# Patient Record
Sex: FEMALE | Race: WHITE | HISPANIC OR LATINO | Employment: FULL TIME | URBAN - METROPOLITAN AREA
[De-identification: names, ages, dates, MRNs, and addresses within clinical notes are randomized per-mention and may not be internally consistent; named-entity substitution may affect disease eponyms.]

---

## 2019-03-09 ENCOUNTER — OFFICE VISIT (OUTPATIENT)
Dept: URGENT CARE | Facility: CLINIC | Age: 24
End: 2019-03-09
Payer: MEDICAID

## 2019-03-09 VITALS
OXYGEN SATURATION: 99 % | HEIGHT: 63 IN | TEMPERATURE: 98 F | WEIGHT: 267 LBS | RESPIRATION RATE: 18 BRPM | SYSTOLIC BLOOD PRESSURE: 136 MMHG | HEART RATE: 80 BPM | DIASTOLIC BLOOD PRESSURE: 80 MMHG | BODY MASS INDEX: 47.31 KG/M2

## 2019-03-09 DIAGNOSIS — J06.9 VIRAL UPPER RESPIRATORY TRACT INFECTION WITH COUGH: Primary | ICD-10-CM

## 2019-03-09 DIAGNOSIS — R09.81 NASAL SINUS CONGESTION: ICD-10-CM

## 2019-03-09 PROCEDURE — 99213 OFFICE O/P EST LOW 20 MIN: CPT | Mod: S$GLB,,, | Performed by: PHYSICIAN ASSISTANT

## 2019-03-09 PROCEDURE — 99213 PR OFFICE/OUTPT VISIT, EST, LEVL III, 20-29 MIN: ICD-10-PCS | Mod: S$GLB,,, | Performed by: PHYSICIAN ASSISTANT

## 2019-03-09 RX ORDER — PREDNISONE 5 MG/1
5 TABLET ORAL DAILY
Qty: 5 TABLET | Refills: 0 | Status: SHIPPED | OUTPATIENT
Start: 2019-03-09 | End: 2019-03-14

## 2019-03-09 RX ORDER — GUAIFENESIN 600 MG/1
600 TABLET, EXTENDED RELEASE ORAL 2 TIMES DAILY
Qty: 40 TABLET | Refills: 0 | Status: SHIPPED | OUTPATIENT
Start: 2019-03-09 | End: 2019-03-29

## 2019-03-09 RX ORDER — IBUPROFEN 400 MG/1
400 TABLET ORAL EVERY 6 HOURS PRN
Qty: 60 TABLET | Refills: 2 | Status: SHIPPED | OUTPATIENT
Start: 2019-03-09 | End: 2020-03-08

## 2019-03-09 RX ORDER — PROMETHAZINE HYDROCHLORIDE 6.25 MG/5ML
6.25 SYRUP ORAL 4 TIMES DAILY PRN
Qty: 120 ML | Refills: 0 | Status: SHIPPED | OUTPATIENT
Start: 2019-03-09 | End: 2020-03-08

## 2019-03-09 NOTE — PATIENT INSTRUCTIONS
Viral Upper Respiratory Illness with Wheezing (Adult)  You have a viral upper respiratory illness (URI), which is another term for the common cold. When the infection causes a lot of irritation, the air passages can go into spasm. This causes wheezing and shortness of breath.    This illness is contagious during the first few days. It is spread through the air by coughing and sneezing. It may also be spread by direct contact (touching the sick person and then touching your own eyes, nose, or mouth). Frequent handwashing will decrease the risk.  Most viral illnesses go away within 7 to 10 days with rest and simple home remedies. Sometimes the illness may last for several weeks. Antibiotics will not kill a virus, and they are generally not prescribed for this condition.  Home care  · If symptoms are severe, rest at home for the first 2 to 3 days. When you resume activity, don't let yourself get too tired.  · Avoid being exposed to cigarette smoke (yours or others).  · You may use acetaminophen or ibuprofen to control pain and fever, unless another medicine was prescribed. (Note: If you have chronic liver or kidney disease, have ever had a stomach ulcer or gastrointestinal bleeding, or are taking blood-thinning medicines, talk with your healthcare provider before using these medicines.) Aspirin should never be given to anyone under 18 years of age who is ill with a viral infection or fever. It may cause severe liver or brain damage.  · Your appetite may be poor, so a light diet is fine. Avoid dehydration by drinking 6 to 8 glasses of fluids per day (water, soft drinks, juices, tea, or soup). Extra fluids will help loosen secretions in the nose and lungs.  · Over-the-counter cold medicines will not shorten the length of time youre sick, but they may be helpful for the following symptoms: cough, sore throat, and nasal and sinus congestion. (Note: Do not use decongestants if you have high blood pressure.)  Follow-up  care  Follow up with your healthcare provider, or as advised.  When to seek medical advice  Call your healthcare provider right away if any of these occur:  · Cough with lots of colored sputum (mucus)  · Severe headache; face, neck, or ear pain  · Difficulty swallowing due to throat pain  · Fever of 100.4ºF (38ºC) or higher, or as directed by your healthcare provider  Call 911, or get immediate medical care  Call emergency services right away if any of these occur:  · Chest pain, shortness of breath, worsening wheezing, or difficulty breathing  · Coughing up blood  · Inability to swallow due to throat pain  Date Last Reviewed: 9/13/2015  © 6196-1342 [employer] [address] [place] [address], [place] [address]. All rights reserved. This information is not intended as a substitute for professional medical care. Always follow your healthcare professional's instructions.

## 2019-03-09 NOTE — PROGRESS NOTES
"Subjective:       Patient ID: Peyton Qiu is a 23 y.o. female.    Vitals:  height is 5' 3" (1.6 m) and weight is 121.1 kg (267 lb). Her tympanic temperature is 97.8 °F (36.6 °C). Her blood pressure is 136/80 and her pulse is 80. Her respiration is 18 and oxygen saturation is 99%.     Chief Complaint: Cough    Cough for 3 days.      Cough   This is a new problem. The current episode started in the past 7 days. The problem has been unchanged. The cough is productive of sputum. Associated symptoms include chills, postnasal drip and wheezing. Pertinent negatives include no ear pain, eye redness, fever, hemoptysis, myalgias, rash, sore throat or shortness of breath. Nothing aggravates the symptoms. She has tried OTC cough suppressant for the symptoms. The treatment provided no relief. There is no history of asthma, bronchitis, COPD or pneumonia.       Constitution: Positive for chills. Negative for sweating, fatigue and fever.   HENT: Positive for postnasal drip. Negative for ear pain, congestion, sinus pain, sinus pressure, sore throat and voice change.    Neck: Negative for painful lymph nodes.   Eyes: Positive for eye discharge. Negative for eye redness.   Respiratory: Positive for chest tightness, cough, sputum production and wheezing. Negative for bloody sputum, COPD, shortness of breath, stridor and asthma.    Gastrointestinal: Negative for nausea and vomiting.   Musculoskeletal: Negative for muscle ache.   Skin: Negative for rash.   Allergic/Immunologic: Negative for seasonal allergies and asthma.   Hematologic/Lymphatic: Negative for swollen lymph nodes.       Patient states that she is feeling cold sx: productive cough, sinus congestion, and body aches when coughing, Denies fevers, N/V, chills.  Objective:      Physical Exam   Constitutional: She is oriented to person, place, and time. She appears well-developed and well-nourished. She is cooperative.  Non-toxic appearance. She does not appear " ill. No distress.   HENT:   Head: Normocephalic and atraumatic.   Right Ear: Hearing, external ear and ear canal normal. Tympanic membrane is injected.   Left Ear: Hearing, tympanic membrane, external ear and ear canal normal.   Nose: Rhinorrhea present. No mucosal edema or nasal deformity. No epistaxis. Right sinus exhibits no maxillary sinus tenderness and no frontal sinus tenderness. Left sinus exhibits no maxillary sinus tenderness and no frontal sinus tenderness.   Mouth/Throat: Uvula is midline and mucous membranes are normal. No trismus in the jaw. Normal dentition. No uvula swelling. Posterior oropharyngeal erythema present.   Eyes: Conjunctivae and lids are normal. No scleral icterus.   Sclera clear bilat   Neck: Trachea normal, full passive range of motion without pain and phonation normal. Neck supple.   Cardiovascular: Normal rate, regular rhythm, normal heart sounds, intact distal pulses and normal pulses.   Pulmonary/Chest: Effort normal. No respiratory distress. She has no decreased breath sounds. She has wheezes in the left upper field. She has no rhonchi. She has no rales.   Abdominal: Soft. Normal appearance and bowel sounds are normal. She exhibits no distension. There is no tenderness.   Musculoskeletal: Normal range of motion. She exhibits no edema or deformity.   Neurological: She is alert and oriented to person, place, and time. She exhibits normal muscle tone. Coordination normal.   Skin: Skin is warm, dry and intact. She is not diaphoretic. No pallor.   Psychiatric: She has a normal mood and affect. Her speech is normal and behavior is normal. Judgment and thought content normal. Cognition and memory are normal.   Nursing note and vitals reviewed.      Assessment:       1. Viral upper respiratory tract infection with cough    2. Nasal sinus congestion        Plan:         Viral upper respiratory tract infection with cough  -     promethazine (PHENERGAN) 6.25 mg/5 mL syrup; Take 5 mLs (6.25  mg total) by mouth 4 (four) times daily as needed (for cough at night time).  Dispense: 120 mL; Refill: 0  -     predniSONE (DELTASONE) 5 MG tablet; Take 1 tablet (5 mg total) by mouth once daily. for 5 days  Dispense: 5 tablet; Refill: 0  -     guaiFENesin (MUCINEX) 600 mg 12 hr tablet; Take 1 tablet (600 mg total) by mouth 2 (two) times daily. for 20 days  Dispense: 40 tablet; Refill: 0  -     ibuprofen (ADVIL,MOTRIN) 400 MG tablet; Take 1 tablet (400 mg total) by mouth every 6 (six) hours as needed.  Dispense: 60 tablet; Refill: 2    Nasal sinus congestion  -     loratadine-pseudoephedrine  mg (CLARITIN-D 24 HOUR)  mg per 24 hr tablet; Take 1 tablet by mouth once daily.  Dispense: 30 tablet; Refill: 0      Patient Instructions     Viral Upper Respiratory Illness with Wheezing (Adult)  You have a viral upper respiratory illness (URI), which is another term for the common cold. When the infection causes a lot of irritation, the air passages can go into spasm. This causes wheezing and shortness of breath.    This illness is contagious during the first few days. It is spread through the air by coughing and sneezing. It may also be spread by direct contact (touching the sick person and then touching your own eyes, nose, or mouth). Frequent handwashing will decrease the risk.  Most viral illnesses go away within 7 to 10 days with rest and simple home remedies. Sometimes the illness may last for several weeks. Antibiotics will not kill a virus, and they are generally not prescribed for this condition.  Home care  · If symptoms are severe, rest at home for the first 2 to 3 days. When you resume activity, don't let yourself get too tired.  · Avoid being exposed to cigarette smoke (yours or others).  · You may use acetaminophen or ibuprofen to control pain and fever, unless another medicine was prescribed. (Note: If you have chronic liver or kidney disease, have ever had a stomach ulcer or gastrointestinal  bleeding, or are taking blood-thinning medicines, talk with your healthcare provider before using these medicines.) Aspirin should never be given to anyone under 18 years of age who is ill with a viral infection or fever. It may cause severe liver or brain damage.  · Your appetite may be poor, so a light diet is fine. Avoid dehydration by drinking 6 to 8 glasses of fluids per day (water, soft drinks, juices, tea, or soup). Extra fluids will help loosen secretions in the nose and lungs.  · Over-the-counter cold medicines will not shorten the length of time youre sick, but they may be helpful for the following symptoms: cough, sore throat, and nasal and sinus congestion. (Note: Do not use decongestants if you have high blood pressure.)  Follow-up care  Follow up with your healthcare provider, or as advised.  When to seek medical advice  Call your healthcare provider right away if any of these occur:  · Cough with lots of colored sputum (mucus)  · Severe headache; face, neck, or ear pain  · Difficulty swallowing due to throat pain  · Fever of 100.4ºF (38ºC) or higher, or as directed by your healthcare provider  Call 911, or get immediate medical care  Call emergency services right away if any of these occur:  · Chest pain, shortness of breath, worsening wheezing, or difficulty breathing  · Coughing up blood  · Inability to swallow due to throat pain  Date Last Reviewed: 9/13/2015  © 8530-8734 [employer]. [address] [place] [address]. All rights reserved. This information is not intended as a substitute for professional medical care. Always follow your healthcare professional's instructions.          COLD HOME CARES:  Discussed with patient need for blowing the nose to expel mucus outwards. Also advised sleeping propped up in bed to around 30 degrees to not allow mucus to pool in sinuses or throat. Patient was advised to do salt water gargles upon awakening and at bedtime, with a few in  "between during the day if wished. Patient was given "Daytime vs. Nighttime" cough suppressive medications.     SORE THROAT HOME CARES:  Patient was recommended to continue with supportive cares such as lozenges, throat sprays, warm salt water gargles, OTC NSAIDs such as Ibuprofen or acetaminphen for discomfort of fevers above 101.0 degrees. Patient may benefit from an OTC antihistamine such as Zyrtec or Claritin for post nasal drip that may cause a sore throat.  The goal is to dry the sinuses and disrupt the over production of mucus that is filling the sinus cavities and secondarily causing a sore throat.    Consistency with treatment is key and patient was informed that these medications may be needed for the next few weeks.        "

## 2020-05-30 ENCOUNTER — HOSPITAL ENCOUNTER (EMERGENCY)
Facility: HOSPITAL | Age: 25
Discharge: HOME | End: 2020-05-30
Attending: EMERGENCY MEDICINE
Payer: COMMERCIAL

## 2020-05-30 VITALS
BODY MASS INDEX: 47.31 KG/M2 | DIASTOLIC BLOOD PRESSURE: 75 MMHG | WEIGHT: 267 LBS | HEIGHT: 63 IN | TEMPERATURE: 98 F | OXYGEN SATURATION: 98 % | HEART RATE: 88 BPM | RESPIRATION RATE: 18 BRPM | SYSTOLIC BLOOD PRESSURE: 159 MMHG

## 2020-05-30 DIAGNOSIS — Z20.822 EXPOSURE TO COVID-19 VIRUS: ICD-10-CM

## 2020-05-30 DIAGNOSIS — R51.9 ACUTE NONINTRACTABLE HEADACHE, UNSPECIFIED HEADACHE TYPE: Primary | ICD-10-CM

## 2020-05-30 LAB — SARS-COV-2 RDRP RESP QL NAA+PROBE: NEGATIVE

## 2020-05-30 PROCEDURE — U0002 COVID-19 LAB TEST NON-CDC: HCPCS

## 2020-05-30 PROCEDURE — 99282 EMERGENCY DEPT VISIT SF MDM: CPT | Mod: 25

## 2020-05-30 NOTE — ED PROVIDER NOTES
Encounter Date: 5/30/2020       History     Chief Complaint   Patient presents with    Headache     pt presents to ED today c/o ha's and nausea onset x 2 days after after starting menstrual cycle . pt reports her live in boyfriend tested positive for COVID-19 yesterday and is concerned she may be a carrier since her bodyfriend has not been out in public.     Nausea       Time seen by provider: 12:36 PM on 05/30/2020    The patient is a 24 y.o. female who presents to the ED with complaint of HA and nausea x2 days. Her boyfriend with whom she lives recently tested positive for Covid-19, and she is concerned that she is an asymptomatic carrier. The patient denies fever or any other concerning symptoms.    PMHx includes: Eczema, Seasonal allergic rhinitis, and Vertigo.    PSHx includes: [place] tooth extraction.      The history is provided by the patient.     Review of patient's allergies indicates:   Allergen Reactions    Augmentin [amoxicillin-pot clavulanate] Rash     Past Medical History:   Diagnosis Date    Eczema     Seasonal allergic rhinitis     Vertigo      Past Surgical History:   Procedure Laterality Date    WISDOM TOOTH EXTRACTION       Family History   Problem Relation Age of Onset    Coronary artery disease Father     Diabetes Maternal Grandmother     Heart disease Maternal Grandmother     No Known Problems Mother      Social History     Tobacco Use    Smoking status: Never Smoker    Smokeless tobacco: Never Used   Substance Use Topics    Alcohol use: No    Drug use: Not on file     Review of Systems   Constitutional: Negative for fever.   HENT: Negative for sore throat.    Respiratory: Negative for shortness of breath.    Cardiovascular: Negative for chest pain.   Gastrointestinal: Positive for nausea.   Genitourinary: Negative for dysuria.   Musculoskeletal: Negative for back pain.   Skin: Negative for rash.   Neurological: Positive for headaches. Negative for weakness.   Hematological:  Does not bruise/bleed easily.       Physical Exam     Initial Vitals   BP Pulse Resp Temp SpO2   05/30/20 1129 05/30/20 1128 05/30/20 1128 05/30/20 1128 05/30/20 1128   (!) 159/75 88 18 98.3 °F (36.8 °C) 98 %      MAP       --                Physical Exam    Nursing note and vitals reviewed.  Constitutional: She appears well-developed and well-nourished. She is not diaphoretic. No distress.   HENT:   Head: Normocephalic and atraumatic.   Right Ear: Tympanic membrane normal.   Left Ear: Tympanic membrane normal.   Mouth/Throat: Oropharynx is clear and moist.   Eyes: Conjunctivae and EOM are normal. Pupils are equal, round, and reactive to light.   Neck: Normal range of motion. Neck supple.   Cardiovascular: Normal rate, regular rhythm and normal heart sounds. Exam reveals no gallop and no friction rub.    No murmur heard.  Pulmonary/Chest: Breath sounds normal. She has no wheezes. She has no rhonchi. She has no rales.   Abdominal: Soft. Bowel sounds are normal. There is no tenderness. There is no rebound and no guarding.   Musculoskeletal: Normal range of motion. She exhibits no edema or tenderness.   Lymphadenopathy:     She has no cervical adenopathy.   Neurological: She is alert and oriented to person, place, and time. She has normal strength.   Skin: Skin is warm and dry. Capillary refill takes less than 2 seconds. No rash noted.         ED Course   Procedures  Labs Reviewed   SARS-COV-2 RNA AMPLIFICATION, QUAL          Imaging Results    None          Medical Decision Making:   History:   Old Medical Records: I decided to obtain old medical records.  Old Records Summarized: records from previous admission(s) and records from clinic visits.       <> Summary of Records: 10/24/16 - PT presented to ED for irregular menstrual cycles.  03/09/19 - PT presented to [facility] for flu-like symptoms   Clinical Tests:   Lab Tests: Reviewed and Ordered                                 Clinical Impression:       ICD-10-CM  ICD-9-CM   1. Acute nonintractable headache, unspecified headache type R51 784.0   2. Exposure to Covid-19 Virus Z20.828              ED Disposition Condition    Discharge Stable        ED Prescriptions     None        Follow-up Information     Follow up With Specialties Details Why Contact Info    Darvin Cage Jr., MD Pediatrics  As needed [address] [facility]  [place] [id number]  [phone]                          I, Melanie Bravo, personally performed the services described in this documentation. All medical record entries made by the scribe were at my direction and in my presence.  I have reviewed the chart and agree that the record reflects my personal performance and is accurate and complete. Melanie Bravo M.D. 1:49 PM05/30/2020             Melanie Bravo MD  05/30/20 1342

## 2020-05-30 NOTE — ED NOTES
Pt presents to the ED w/ c/ of headache and nausea for 2 days. Pt reports symptoms started after starting menstrual cycle this week. Pt reports that her live in boyfriend tested +for covid-19. Pt denies cough or SOB or fevers. Pt is awake alert orientedx4. Pt is NAD.

## 2020-07-30 ENCOUNTER — LAB VISIT (OUTPATIENT)
Dept: LAB | Facility: HOSPITAL | Age: 25
End: 2020-07-30
Attending: OBSTETRICS & GYNECOLOGY
Payer: COMMERCIAL

## 2020-07-30 ENCOUNTER — OFFICE VISIT (OUTPATIENT)
Dept: OBSTETRICS AND GYNECOLOGY | Facility: CLINIC | Age: 25
End: 2020-07-30
Payer: COMMERCIAL

## 2020-07-30 VITALS
HEIGHT: 63 IN | SYSTOLIC BLOOD PRESSURE: 124 MMHG | WEIGHT: 293 LBS | BODY MASS INDEX: 51.91 KG/M2 | DIASTOLIC BLOOD PRESSURE: 82 MMHG

## 2020-07-30 DIAGNOSIS — N91.2 AMENORRHEA: ICD-10-CM

## 2020-07-30 DIAGNOSIS — Z12.4 PAP SMEAR FOR CERVICAL CANCER SCREENING: ICD-10-CM

## 2020-07-30 DIAGNOSIS — Z11.3 SCREENING EXAMINATION FOR STD (SEXUALLY TRANSMITTED DISEASE): ICD-10-CM

## 2020-07-30 DIAGNOSIS — Z01.419 ENCOUNTER FOR ANNUAL ROUTINE GYNECOLOGICAL EXAMINATION: Primary | ICD-10-CM

## 2020-07-30 DIAGNOSIS — Z01.419 ENCOUNTER FOR ANNUAL ROUTINE GYNECOLOGICAL EXAMINATION: ICD-10-CM

## 2020-07-30 LAB
FSH SERPL-ACNC: 4.5 MIU/ML
PROLACTIN SERPL IA-MCNC: 18.3 NG/ML (ref 5.2–26.5)
TESTOST SERPL-MCNC: 33 NG/DL (ref 5–73)

## 2020-07-30 PROCEDURE — 86592 SYPHILIS TEST NON-TREP QUAL: CPT

## 2020-07-30 PROCEDURE — 99999 PR PBB SHADOW E&M-EST. PATIENT-LVL III: CPT | Mod: PBBFAC,,, | Performed by: OBSTETRICS & GYNECOLOGY

## 2020-07-30 PROCEDURE — 83498 ASY HYDROXYPROGESTERONE 17-D: CPT

## 2020-07-30 PROCEDURE — 88175 CYTOPATH C/V AUTO FLUID REDO: CPT

## 2020-07-30 PROCEDURE — 84146 ASSAY OF PROLACTIN: CPT

## 2020-07-30 PROCEDURE — 86703 HIV-1/HIV-2 1 RESULT ANTBDY: CPT

## 2020-07-30 PROCEDURE — 99385 PREV VISIT NEW AGE 18-39: CPT | Mod: S$GLB,,, | Performed by: OBSTETRICS & GYNECOLOGY

## 2020-07-30 PROCEDURE — 84403 ASSAY OF TOTAL TESTOSTERONE: CPT

## 2020-07-30 PROCEDURE — 87491 CHLMYD TRACH DNA AMP PROBE: CPT

## 2020-07-30 PROCEDURE — 83001 ASSAY OF GONADOTROPIN (FSH): CPT

## 2020-07-30 PROCEDURE — 99999 PR PBB SHADOW E&M-EST. PATIENT-LVL III: ICD-10-PCS | Mod: PBBFAC,,, | Performed by: OBSTETRICS & GYNECOLOGY

## 2020-07-30 PROCEDURE — 80074 ACUTE HEPATITIS PANEL: CPT

## 2020-07-30 PROCEDURE — 99385 PR PREVENTIVE VISIT,NEW,18-39: ICD-10-PCS | Mod: S$GLB,,, | Performed by: OBSTETRICS & GYNECOLOGY

## 2020-07-30 NOTE — PROGRESS NOTES
Chief Complaint   Patient presents with    Annual Exam       HISTORY OF PRESENT ILLNESS:   Peyton Qiu is a 24 y.o. female  who presents for well woman exam.  Patient's last menstrual period was 06/19/2020..  She has complains of  No cycle x 1 year. She had irregular cycles where she almost would not get a cycle when she turned 18 so was on ocps. She never had a cycle when on ocps and stopped them a year ago and hasn't had one. She just started with a cycle that has been lasting for a month.   Desires STD testing. She has gained weight and struggled to lose it. She denies hair growth in unusual places or galactorrhea or hotflashes.      Past Medical History:   Diagnosis Date    Eczema     Seasonal allergic rhinitis     Vertigo           Past Surgical History:   Procedure Laterality Date    WISDOM TOOTH EXTRACTION           Social History     Socioeconomic History    Marital status: Single     Spouse name: Not on file    Number of children: Not on file    Years of education: Not on file    Highest education level: Not on file   Occupational History    Not on file   Social Needs    Financial resource strain: Not on file    Food insecurity     Worry: Not on file     Inability: Not on file    Transportation needs     Medical: Not on file     Non-medical: Not on file   Tobacco Use    Smoking status: Never Smoker    Smokeless tobacco: Never Used   Substance and Sexual Activity    Alcohol use: No    Drug use: Never    Sexual activity: Yes     Partners: Male   Lifestyle    Physical activity     Days per week: Not on file     Minutes per session: Not on file    Stress: Not on file   Relationships    Social connections     Talks on phone: Not on file     Gets together: Not on file     Attends Jain service: Not on file     Active member of club or organization: Not on file     Attends meetings of clubs or organizations: Not on file     Relationship status: Not on file   Other Topics  "Concern    Not on file   Social History Narrative    Not on file       Family History   Problem Relation Age of Onset    Coronary artery disease Father     Diabetes Maternal Grandmother     Heart disease Maternal Grandmother     No Known Problems Mother          OB History    Para Term  AB Living   0 0 0 0 0 0   SAB TAB Ectopic Multiple Live Births   0 0 0 0 0         COMPREHENSIVE GYN HISTORY:  PAP History: Denies abnormal Paps  Infection History: Denies STDs. Denies PID.  Benign History:Denies uterine fibroids. Denies ovarian cysts. Denies endometriosis Denies other conditions.  Cancer History: Denies cervical cancer. Denies uterine cancer or hyperplasia. Denies ovarian cancer. Denies vulvar cancer or pre-cancer. Denies vaginal cancer or pre-cancer. Denies breast cancer. Denies colon cancer.  Cycle: 12/irregular   Was on ocps, currently not on anything and is sexually active   Had hpv vaccine    ROS:  GENERAL: Denies weight gain or weight loss. Feeling well overall.   SKIN: Denies rash or lesions.   HEAD: Denies headache.   NODES: Denies enlarged lymph nodes.   CHEST: Denies shortness of breath.   ABDOMEN: No abdominal pain, constipation, diarrhea, nausea, vomiting or rectal bleeding.   URINARY: No frequency, dysuria, hematuria, or burning on urination.  REPRODUCTIVE: See HPI.   BREASTS: The patient denies pain, lumps, or nipple discharge.       /82   Ht 5' 3" (1.6 m)   Wt (!) 138.5 kg (305 lb 5.4 oz)   LMP 2020   BMI 54.09 kg/m²     APPEARANCE: Well nourished, well developed, in no acute distress.  NECK: Neck symmetric without  Thyromegaly. Has acanthosis nigricans   NODES: No inguinal, cervical lymph node enlargement.  CHEST: Lungs clear to auscultation.  HEART: Regular rate and rhythm, no murmurs, rubs or gallops.  ABDOMEN: Soft. No tenderness or masses. No hernias. No hepatosplenomegaly.  BREASTS: Symmetrical, no skin changes or visible lesions. No palpable masses, nipple " discharge or adenopathy bilaterally.  PELVIC:   VULVA: No lesions. Normal female genitalia.  URETHRAL MEATUS: Normal size and location, no lesions, no prolapse.  URETHRA: No masses, tenderness, prolapse or scarring.  VAGINA: Moist and well rugated, no discharge, no significant cystocele or rectocele.  CERVIX: No lesions and discharge.  UTERUS: Normal size, regular shape, mobile, non-tender, bladder base nontender.  ADNEXA: No masses or tenderness.    upt negative     1. Encounter for annual routine gynecological examination    2. Pap smear for cervical cancer screening    3. Screening examination for STD (sexually transmitted disease)        Plan:  Routine gyn s/p normal breast exam. Pap without HPV cotesting ordered . STD testing: GC/CT ordered but syphilis, HBV/HCV and HIV declined.   2. Discussed anovulation and that it is not normal to go months without a cycle if you are not on birth control. Discussed the risk of endometrial hyperplasia/cancer with long term anovulatory bleeding and oligomenorrhea and recommend treatment. Will work up with TSH, prolactin, FSH and TVUS. Possible pcos vs weight related vs CAH and other possible etiology that must be evaluated. Discussed treatment would be cyclic or daily provera or birth control like pills, depo or IUD to help with cycle regulation and to thin the lining of the uterus. She  would like to think about options and let us know after results what she decides. Will see back after US. We discussed if appears like PCOS then would recommend 2 hour GTT to check for insulin resistance and possible metformin along with the weight loss she is working on. PCP may have ordered A1C and lipids already which were normal.         F/u in 1 yr or PRN

## 2020-07-31 LAB
HAV IGM SERPL QL IA: NEGATIVE
HBV CORE IGM SERPL QL IA: NEGATIVE
HBV SURFACE AG SERPL QL IA: NEGATIVE
HBV SURFACE AG SERPL QL IA: NEGATIVE
HCV AB SERPL QL IA: NEGATIVE
HIV 1+2 AB+HIV1 P24 AG SERPL QL IA: NEGATIVE
RPR SER QL: NORMAL

## 2020-08-03 ENCOUNTER — TELEPHONE (OUTPATIENT)
Dept: OBSTETRICS AND GYNECOLOGY | Facility: CLINIC | Age: 25
End: 2020-08-03

## 2020-08-03 LAB — 17OHP SERPL-MCNC: 55 NG/DL (ref 35–413)

## 2020-08-03 NOTE — PROGRESS NOTES
Please let her know that her lab work looks good. Her STD testing including HIV, syphilis and hepatitis were negative and thyroid test was normal. She had normal levels of testosterone and prolactin

## 2020-08-04 ENCOUNTER — TELEPHONE (OUTPATIENT)
Dept: OBSTETRICS AND GYNECOLOGY | Facility: CLINIC | Age: 25
End: 2020-08-04

## 2020-08-04 NOTE — TELEPHONE ENCOUNTER
Notified patient of recent test results (STD testing and lab results).  Patient verbalized understanding. All questions answered and addressed.

## 2020-08-04 NOTE — TELEPHONE ENCOUNTER
----- Message from Maki Gomes MD sent at 8/3/2020 11:18 AM CDT -----  Please let her know that her lab work looks good. Her STD testing including HIV, syphilis and hepatitis were negative and thyroid test was normal. She had normal levels of testosterone and prolactin

## 2020-08-05 LAB
C TRACH DNA SPEC QL NAA+PROBE: NOT DETECTED
N GONORRHOEA DNA SPEC QL NAA+PROBE: NOT DETECTED

## 2020-08-06 ENCOUNTER — TELEPHONE (OUTPATIENT)
Dept: OBSTETRICS AND GYNECOLOGY | Facility: CLINIC | Age: 25
End: 2020-08-06

## 2020-08-06 NOTE — PROGRESS NOTES
Please let patient know that her STD screening including GC/CT/trich was negative. We are still waiting on the results of her pap smear. Thanks.

## 2020-08-06 NOTE — TELEPHONE ENCOUNTER
----- Message from Maki Gomes MD sent at 8/6/2020 10:11 AM CDT -----  Please let patient know that her STD screening including GC/CT/trich was negative. We are still waiting on the results of her pap smear. Thanks.

## 2020-08-12 ENCOUNTER — HOSPITAL ENCOUNTER (OUTPATIENT)
Dept: RADIOLOGY | Facility: HOSPITAL | Age: 25
Discharge: HOME | End: 2020-08-12
Attending: OBSTETRICS & GYNECOLOGY
Payer: COMMERCIAL

## 2020-08-12 ENCOUNTER — OFFICE VISIT (OUTPATIENT)
Dept: OBSTETRICS AND GYNECOLOGY | Facility: CLINIC | Age: 25
End: 2020-08-12
Payer: COMMERCIAL

## 2020-08-12 VITALS — WEIGHT: 293 LBS | SYSTOLIC BLOOD PRESSURE: 130 MMHG | BODY MASS INDEX: 54.09 KG/M2 | DIASTOLIC BLOOD PRESSURE: 78 MMHG

## 2020-08-12 DIAGNOSIS — E28.2 PCOS (POLYCYSTIC OVARIAN SYNDROME): Primary | ICD-10-CM

## 2020-08-12 DIAGNOSIS — D25.9 UTERINE LEIOMYOMA, UNSPECIFIED LOCATION: ICD-10-CM

## 2020-08-12 DIAGNOSIS — N91.2 AMENORRHEA: ICD-10-CM

## 2020-08-12 PROCEDURE — 99212 OFFICE O/P EST SF 10 MIN: CPT | Mod: S$GLB,,, | Performed by: OBSTETRICS & GYNECOLOGY

## 2020-08-12 PROCEDURE — 3008F BODY MASS INDEX DOCD: CPT | Mod: CPTII,S$GLB,, | Performed by: OBSTETRICS & GYNECOLOGY

## 2020-08-12 PROCEDURE — 76830 TRANSVAGINAL US NON-OB: CPT | Mod: TC

## 2020-08-12 PROCEDURE — 99999 PR PBB SHADOW E&M-EST. PATIENT-LVL III: CPT | Mod: PBBFAC,,, | Performed by: OBSTETRICS & GYNECOLOGY

## 2020-08-12 PROCEDURE — 99999 PR PBB SHADOW E&M-EST. PATIENT-LVL III: ICD-10-PCS | Mod: PBBFAC,,, | Performed by: OBSTETRICS & GYNECOLOGY

## 2020-08-12 PROCEDURE — 99212 PR OFFICE/OUTPT VISIT, EST, LEVL II, 10-19 MIN: ICD-10-PCS | Mod: S$GLB,,, | Performed by: OBSTETRICS & GYNECOLOGY

## 2020-08-12 PROCEDURE — 76856 US PELVIS COMP WITH TRANSVAG NON-OB (XPD): ICD-10-PCS | Mod: 26,,, | Performed by: RADIOLOGY

## 2020-08-12 PROCEDURE — 76856 US EXAM PELVIC COMPLETE: CPT | Mod: 26,,, | Performed by: RADIOLOGY

## 2020-08-12 PROCEDURE — 76830 TRANSVAGINAL US NON-OB: CPT | Mod: 26,,, | Performed by: RADIOLOGY

## 2020-08-12 PROCEDURE — 76830 US PELVIS COMP WITH TRANSVAG NON-OB (XPD): ICD-10-PCS | Mod: 26,,, | Performed by: RADIOLOGY

## 2020-08-12 PROCEDURE — 3008F PR BODY MASS INDEX (BMI) DOCUMENTED: ICD-10-PCS | Mod: CPTII,S$GLB,, | Performed by: OBSTETRICS & GYNECOLOGY

## 2020-08-12 RX ORDER — MEDROXYPROGESTERONE ACETATE 10 MG/1
TABLET ORAL
Qty: 30 TABLET | Refills: 4 | Status: SHIPPED | OUTPATIENT
Start: 2020-08-12 | End: 2020-11-12 | Stop reason: SDUPTHER

## 2020-08-12 NOTE — PROGRESS NOTES
No chief complaint on file.      HISTORY OF PRESENT ILLNESS:   Peyton Qiu is a 24 y.o. female  who presents for well woman exam.  Patient's last menstrual period was 08/12/2020..  She has complains of  No cycle x 1 year. She had irregular cycles where she almost would not get a cycle when she turned 18 so was on ocps. She never had a cycle when on ocps and stopped them a year ago and hasn't had one. She just started with a cycle that has been lasting for a month.   Desires STD testing. She has gained weight and struggled to lose it. She denies hair growth in unusual places or galactorrhea or hotflashes.      Past Medical History:   Diagnosis Date    Eczema     Seasonal allergic rhinitis     Vertigo    caty   Past Surgical History:   Procedure Laterality Date    WISDOM TOOTH EXTRACTION     History    Marital status: Single     Spouse name: Not on file    Number of children: Not on file    Years of education: Not on file    Highest education level: Not on file   Occupational History    Not on file   Social Needs    Financial resource strain: Not on file    Food insecurity     Worry: Not on file     Inability: Not on file    Transportation needs     Medical: Not on file     Non-medical: Not on file   Tobacco Use    Smoking status: Never Smoker    Smokeless tobacco: Never Used   Substance and Sexual Activity    Alcohol use: No    Drug use: Never    Sexual activity: Yes     Partners: Male   Lifestyle    Physical activity     Days per week: Not on file     Minutes per session: Not on file    Stress: Not on file   Relationships    Social connections     Talks on phone: Not on file     Gets together: Not on file     Attends Spiritism service: Not on file     Active member of club or organization: Not on file     Attends meetings of clubs or organizations: Not on file     Relationship status: Not on file   Other Topics Concern    Not on file   Social History Narrative    Not on file        Family History   Problem Relation Age of Onset    Coronary artery disease Father     Diabetes Maternal Grandmother     Heart disease Maternal Grandmother     No Known Problems Mother          OB History    Para Term  AB Living   0 0 0 0 0 0   SAB TAB Ectopic Multiple Live Births   0 0 0 0 0         COMPREHENSIVE GYN HISTORY:  PAP History: Denies abnormal Paps  Infection History: Denies STDs. Denies PID.  Benign History:Denies uterine fibroids. Denies ovarian cysts. Denies endometriosis Denies other conditions.  Cancer History: Denies cervical cancer. Denies uterine cancer or hyperplasia. Denies ovarian cancer. Denies vulvar cancer or pre-cancer. Denies vaginal cancer or pre-cancer. Denies breast cancer. Denies colon cancer.  Cycle: 12/irregular   Was on ocps, currently not on anything and is sexually active   Had hpv vaccine    ROS:  GENERAL: Denies weight gain or weight loss. Feeling well overall.   SKIN: Denies rash or lesions.   HEAD: Denies headache.   NODES: Denies enlarged lymph nodes.   CHEST: Denies shortness of breath.   ABDOMEN: No abdominal pain, constipation, diarrhea, nausea, vomiting or rectal bleeding.   URINARY: No frequency, dysuria, hematuria, or burning on urination.  REPRODUCTIVE: See HPI.   BREASTS: The patient denies pain, lumps, or nipple discharge.       /78   Wt (!) 138.5 kg (305 lb 5.4 oz)   LMP 2020   BMI 54.09 kg/m²     APPEARANCE: Well nourished, well developed, in no acute distress.    DHEA and testosterone: normal   FSH, prolactin and TSH normal     upt negative     1. PCOS (polycystic ovarian syndrome)    2. Uterine leiomyoma, unspecified location        Plan:  Routine gyn s/p normal breast exam. Pap without HPV cotesting ordered . STD testing: GC/CT ordered but syphilis, HBV/HCV and HIV declined.   2. Discussed anovulation and that it is not normal to go months without a cycle if you are not on birth control. Her US and presentation are most  consistent with PCOS. Discussed the risk of endometrial hyperplasia/cancer with long term anovulatory bleeding and oligomenorrhea and recommend treatment.  Discussed treatment would be cyclic or daily provera or birth control like pills, depo or IUD to help with cycle regulation and to thin the lining of the uterus. She  would like to do provera and work on weight loss to see if can start to ovulate on own and possible get pregnant. PCOS sorecommend 2 hour GTT to check for insulin resistance and possible metformin along with the weight loss she is working on. Will also check cholesterol.   3. Discussed fibroid, is about 4-5 cm, would recommend repeat US in 4-6 months to make sure stable. We discussed this is large however if not causing issues and not growing rapidly can monitor and leave it alone. Discussed typically doesn't cause fertility issues and removal can cause scar tissue and possible need for c-sections in the future. But if causes symptoms or if impinging on cavity then consider removal.

## 2020-08-13 ENCOUNTER — LAB VISIT (OUTPATIENT)
Dept: LAB | Facility: HOSPITAL | Age: 25
End: 2020-08-13
Attending: OBSTETRICS & GYNECOLOGY
Payer: COMMERCIAL

## 2020-08-13 DIAGNOSIS — E28.2 PCOS (POLYCYSTIC OVARIAN SYNDROME): ICD-10-CM

## 2020-08-13 LAB
CHOLEST SERPL-MCNC: 192 MG/DL (ref 120–199)
CHOLEST/HDLC SERPL: 5.1 {RATIO} (ref 2–5)
GLUCOSE SERPL-MCNC: 126 MG/DL
GLUCOSE SERPL-MCNC: 130 MG/DL
GLUCOSE SERPL-MCNC: 74 MG/DL (ref 70–110)
HDLC SERPL-MCNC: 38 MG/DL (ref 40–75)
HDLC SERPL: 19.8 % (ref 20–50)
LDLC SERPL CALC-MCNC: 132.4 MG/DL (ref 63–159)
NONHDLC SERPL-MCNC: 154 MG/DL
TRIGL SERPL-MCNC: 108 MG/DL (ref 30–150)

## 2020-08-13 PROCEDURE — 80061 LIPID PANEL: CPT

## 2020-08-13 PROCEDURE — 36415 COLL VENOUS BLD VENIPUNCTURE: CPT

## 2020-08-13 PROCEDURE — 82951 GLUCOSE TOLERANCE TEST (GTT): CPT

## 2020-08-14 LAB
FINAL PATHOLOGIC DIAGNOSIS: NORMAL
Lab: NORMAL

## 2020-08-17 ENCOUNTER — TELEPHONE (OUTPATIENT)
Dept: OBSTETRICS AND GYNECOLOGY | Facility: CLINIC | Age: 25
End: 2020-08-17

## 2020-08-17 RX ORDER — METFORMIN HYDROCHLORIDE 500 MG/1
500 TABLET ORAL 2 TIMES DAILY WITH MEALS
Qty: 60 TABLET | Refills: 11 | Status: SHIPPED | OUTPATIENT
Start: 2020-08-17 | End: 2020-11-12

## 2020-08-17 NOTE — TELEPHONE ENCOUNTER
Please let her know that her diabetes test looks ok but she could have some mild insulin resistance so I sent in metformin her to take twice daily. It can cause diarrhea and upset stomach when first started to start with one pill daily for 7 days then go up to twice daily. Let me know if she has any questions.

## 2020-08-20 ENCOUNTER — TELEPHONE (OUTPATIENT)
Dept: OBSTETRICS AND GYNECOLOGY | Facility: CLINIC | Age: 25
End: 2020-08-20

## 2020-08-20 RX ORDER — METFORMIN HYDROCHLORIDE 500 MG/1
500 TABLET, EXTENDED RELEASE ORAL
Qty: 30 TABLET | Refills: 11 | Status: SHIPPED | OUTPATIENT
Start: 2020-08-20 | End: 2021-08-20

## 2020-08-20 NOTE — TELEPHONE ENCOUNTER
Can try the extended release metformin sometimes that causes less side effects, I would start slow with one pill daily for a while then go up to twice daily. If that doesn't work then let me know

## 2020-08-20 NOTE — TELEPHONE ENCOUNTER
----- Message from Nalini Carrington sent at 8/20/2020  3:46 PM CDT -----  Contact: Patient  Patient would like to get a call back to discuss getting paperwork for her Insurance    Please call to discuss [phone]

## 2020-08-24 ENCOUNTER — TELEPHONE (OUTPATIENT)
Dept: OBSTETRICS AND GYNECOLOGY | Facility: CLINIC | Age: 25
End: 2020-08-24

## 2020-09-03 ENCOUNTER — PATIENT MESSAGE (OUTPATIENT)
Dept: OBSTETRICS AND GYNECOLOGY | Facility: CLINIC | Age: 25
End: 2020-09-03

## 2020-09-03 RX ORDER — ONDANSETRON 8 MG/1
8 TABLET, ORALLY DISINTEGRATING ORAL EVERY 8 HOURS PRN
Qty: 10 TABLET | Refills: 2 | Status: SHIPPED | OUTPATIENT
Start: 2020-09-03 | End: 2022-01-20

## 2020-09-03 RX ORDER — IBUPROFEN 800 MG/1
800 TABLET ORAL EVERY 8 HOURS PRN
Qty: 30 TABLET | Refills: 2 | Status: SHIPPED | OUTPATIENT
Start: 2020-09-03 | End: 2021-09-03

## 2020-09-03 NOTE — TELEPHONE ENCOUNTER
Patient states her cycle started about two days ago.  She is having a lot of pain and vomiting.  She has tried heating pad, soaking, tylenol and peppermint.  She states nothing is working.  Please advise.

## 2020-09-09 ENCOUNTER — PATIENT MESSAGE (OUTPATIENT)
Dept: OBSTETRICS AND GYNECOLOGY | Facility: CLINIC | Age: 25
End: 2020-09-09

## 2020-09-09 DIAGNOSIS — N93.9 ABNORMAL UTERINE BLEEDING (AUB): Primary | ICD-10-CM

## 2020-09-09 RX ORDER — NORGESTIMATE AND ETHINYL ESTRADIOL 0.25-0.035
1 KIT ORAL DAILY
Qty: 30 TABLET | Refills: 11 | Status: SHIPPED | OUTPATIENT
Start: 2020-09-09 | End: 2020-11-12

## 2020-09-29 ENCOUNTER — PATIENT MESSAGE (OUTPATIENT)
Dept: OBSTETRICS AND GYNECOLOGY | Facility: CLINIC | Age: 25
End: 2020-09-29

## 2020-09-29 NOTE — TELEPHONE ENCOUNTER
Called patient to schedule appointment to come and talk with Dr Gomes about pre conception. Left message for patient to return call.

## 2020-09-30 ENCOUNTER — PATIENT MESSAGE (OUTPATIENT)
Dept: OBSTETRICS AND GYNECOLOGY | Facility: CLINIC | Age: 25
End: 2020-09-30

## 2020-10-01 ENCOUNTER — PATIENT MESSAGE (OUTPATIENT)
Dept: OBSTETRICS AND GYNECOLOGY | Facility: CLINIC | Age: 25
End: 2020-10-01

## 2020-10-02 ENCOUNTER — TELEPHONE (OUTPATIENT)
Dept: OBSTETRICS AND GYNECOLOGY | Facility: CLINIC | Age: 25
End: 2020-10-02

## 2020-10-02 NOTE — TELEPHONE ENCOUNTER
----- Message from Ashwini Joe sent at 10/2/2020 10:11 AM CDT -----  Regarding: Jeannie Calling [facility] office  [phone]  Contact: Jeannie Calling [facility] office  [phone]  Calling to verify patients excuse note and her restrictions.

## 2020-10-02 NOTE — TELEPHONE ENCOUNTER
Off received work note to return to work can she return to full duty or light duty.  Please clarify.

## 2020-10-05 NOTE — TELEPHONE ENCOUNTER
I am confused. She is not pregnant. Why would she need a note to go light duty? We excused her from work for a few days of diarrhea that she had when she started metformin. This shouldn't be a long term thing.

## 2020-10-06 NOTE — TELEPHONE ENCOUNTER
Left message stating pt would need a letter stating she can go back to work full duty. She will need to know to come get the letter or faxed to her work placed.

## 2020-10-08 ENCOUNTER — TELEPHONE (OUTPATIENT)
Dept: OBSTETRICS AND GYNECOLOGY | Facility: CLINIC | Age: 25
End: 2020-10-08

## 2020-10-08 NOTE — TELEPHONE ENCOUNTER
Explained to patient that there is no reason for her to stay on light duty.  Explained it was temporary because of her diarrhea to the medication.  Patient states her cycle is very heavy and she is still having the diarrhea.  Informed again these are not reasons for Dr Gomes to keep her on light duty.  Patient is not satisfied stating nothing has changed since Dr Gomes agreed to her light duty

## 2020-10-08 NOTE — TELEPHONE ENCOUNTER
----- Message from Katyh Mckinley sent at 10/8/2020 12:45 PM CDT -----  Type:  Patient Returning Call    Who Called: Peyton  Who Left Message for Patient: Radha  Does the patient know what this is regarding?: return to work letter  Would the patient rather a call back or a response via [employer]? Call back  Best Call Back Number: [phone]  Additional Information: none

## 2020-10-08 NOTE — TELEPHONE ENCOUNTER
----- Message from Dontrell Dahl sent at 10/8/2020  1:27 PM CDT -----  Regarding: Plese fax  Type:  Patient Returning Call    Who Called: patient  Who Left Message for Patient: Paula  Does the patient know what this is regarding?: yes  Additional Information:  says you can just fax the letter to her employer please

## 2020-10-19 ENCOUNTER — PATIENT MESSAGE (OUTPATIENT)
Dept: OBSTETRICS AND GYNECOLOGY | Facility: CLINIC | Age: 25
End: 2020-10-19

## 2020-11-12 ENCOUNTER — HOSPITAL ENCOUNTER (OUTPATIENT)
Dept: RADIOLOGY | Facility: HOSPITAL | Age: 25
Discharge: HOME | End: 2020-11-12
Attending: OBSTETRICS & GYNECOLOGY
Payer: COMMERCIAL

## 2020-11-12 ENCOUNTER — OFFICE VISIT (OUTPATIENT)
Dept: OBSTETRICS AND GYNECOLOGY | Facility: CLINIC | Age: 25
End: 2020-11-12
Payer: COMMERCIAL

## 2020-11-12 VITALS
WEIGHT: 293 LBS | BODY MASS INDEX: 51.91 KG/M2 | DIASTOLIC BLOOD PRESSURE: 70 MMHG | HEIGHT: 63 IN | SYSTOLIC BLOOD PRESSURE: 120 MMHG

## 2020-11-12 DIAGNOSIS — D25.9 UTERINE LEIOMYOMA, UNSPECIFIED LOCATION: ICD-10-CM

## 2020-11-12 DIAGNOSIS — E28.2 PCOS (POLYCYSTIC OVARIAN SYNDROME): Primary | ICD-10-CM

## 2020-11-12 PROCEDURE — 99212 PR OFFICE/OUTPT VISIT, EST, LEVL II, 10-19 MIN: ICD-10-PCS | Mod: S$GLB,,, | Performed by: OBSTETRICS & GYNECOLOGY

## 2020-11-12 PROCEDURE — 1126F PR PAIN SEVERITY QUANTIFIED, NO PAIN PRESENT: ICD-10-PCS | Mod: S$GLB,,, | Performed by: OBSTETRICS & GYNECOLOGY

## 2020-11-12 PROCEDURE — 3008F BODY MASS INDEX DOCD: CPT | Mod: CPTII,S$GLB,, | Performed by: OBSTETRICS & GYNECOLOGY

## 2020-11-12 PROCEDURE — 99999 PR PBB SHADOW E&M-EST. PATIENT-LVL III: ICD-10-PCS | Mod: PBBFAC,,, | Performed by: OBSTETRICS & GYNECOLOGY

## 2020-11-12 PROCEDURE — 99212 OFFICE O/P EST SF 10 MIN: CPT | Mod: S$GLB,,, | Performed by: OBSTETRICS & GYNECOLOGY

## 2020-11-12 PROCEDURE — 76830 TRANSVAGINAL US NON-OB: CPT | Mod: 26,,, | Performed by: RADIOLOGY

## 2020-11-12 PROCEDURE — 99999 PR PBB SHADOW E&M-EST. PATIENT-LVL III: CPT | Mod: PBBFAC,,, | Performed by: OBSTETRICS & GYNECOLOGY

## 2020-11-12 PROCEDURE — 3008F PR BODY MASS INDEX (BMI) DOCUMENTED: ICD-10-PCS | Mod: CPTII,S$GLB,, | Performed by: OBSTETRICS & GYNECOLOGY

## 2020-11-12 PROCEDURE — 76856 US EXAM PELVIC COMPLETE: CPT | Mod: 26,,, | Performed by: RADIOLOGY

## 2020-11-12 PROCEDURE — 76830 US PELVIS COMP WITH TRANSVAG NON-OB (XPD): ICD-10-PCS | Mod: 26,,, | Performed by: RADIOLOGY

## 2020-11-12 PROCEDURE — 76830 TRANSVAGINAL US NON-OB: CPT | Mod: TC

## 2020-11-12 PROCEDURE — 1126F AMNT PAIN NOTED NONE PRSNT: CPT | Mod: S$GLB,,, | Performed by: OBSTETRICS & GYNECOLOGY

## 2020-11-12 PROCEDURE — 76856 US PELVIS COMP WITH TRANSVAG NON-OB (XPD): ICD-10-PCS | Mod: 26,,, | Performed by: RADIOLOGY

## 2020-11-12 RX ORDER — METFORMIN HYDROCHLORIDE 500 MG/1
500 TABLET ORAL 2 TIMES DAILY WITH MEALS
Qty: 60 TABLET | Refills: 11 | Status: SHIPPED | OUTPATIENT
Start: 2020-11-12 | End: 2021-12-13

## 2020-11-12 RX ORDER — MEDROXYPROGESTERONE ACETATE 10 MG/1
TABLET ORAL
Qty: 30 TABLET | Refills: 4 | Status: SHIPPED | OUTPATIENT
Start: 2020-11-12 | End: 2021-07-06 | Stop reason: SDUPTHER

## 2020-11-12 NOTE — PROGRESS NOTES
CC: ultrasound       HISTORY OF PRESENT ILLNESS:   Peyton Qiu is a 24 y.o. female  With PCOS who presents for f/u US to check on stability of uterine fibroid. She reports her cycle is coming now with provera. She is taking metformin daily. She would like to get pregnant in the next year or so.        Past Medical History:   Diagnosis Date    Eczema     Seasonal allergic rhinitis     Vertigo    caty   Past Surgical History:   Procedure Laterality Date    WISDOM TOOTH EXTRACTION     History    Marital status: Single     Spouse name: Not on file    Number of children: Not on file    Years of education: Not on file    Highest education level: Not on file   Occupational History    Not on file   Social Needs    Financial resource strain: Not on file    Food insecurity     Worry: Not on file     Inability: Not on file    Transportation needs     Medical: Not on file     Non-medical: Not on file   Tobacco Use    Smoking status: Never Smoker    Smokeless tobacco: Never Used   Substance and Sexual Activity    Alcohol use: No    Drug use: Never    Sexual activity: Yes     Partners: Male   Lifestyle    Physical activity     Days per week: Not on file     Minutes per session: Not on file    Stress: Not on file   Relationships    Social connections     Talks on phone: Not on file     Gets together: Not on file     Attends Yarsani service: Not on file     Active member of club or organization: Not on file     Attends meetings of clubs or organizations: Not on file     Relationship status: Not on file   Other Topics Concern    Not on file   Social History Narrative    Not on file       Family History   Problem Relation Age of Onset    Coronary artery disease Father     Diabetes Maternal Grandmother     Heart disease Maternal Grandmother     No Known Problems Mother          OB History    Para Term  AB Living   0 0 0 0 0 0   SAB TAB Ectopic Multiple Live Births   0 0 0 0 0          COMPREHENSIVE GYN HISTORY:  PAP History: Denies abnormal Paps  Infection History: Denies STDs. Denies PID.  Benign History:Denies uterine fibroids. Denies ovarian cysts. Denies endometriosis Denies other conditions.  Cancer History: Denies cervical cancer. Denies uterine cancer or hyperplasia. Denies ovarian cancer. Denies vulvar cancer or pre-cancer. Denies vaginal cancer or pre-cancer. Denies breast cancer. Denies colon cancer.  Cycle: 12/irregular   Was on ocps, currently not on anything and is sexually active   Had hpv vaccine    ROS:  GENERAL: Denies weight gain or weight loss. Feeling well overall.   SKIN: Denies rash or lesions.   HEAD: Denies headache.   NODES: Denies enlarged lymph nodes.   CHEST: Denies shortness of breath.   ABDOMEN: No abdominal pain, constipation, diarrhea, nausea, vomiting or rectal bleeding.   URINARY: No frequency, dysuria, hematuria, or burning on urination.  REPRODUCTIVE: See HPI.   BREASTS: The patient denies pain, lumps, or nipple discharge.       /78   Wt (!) 138.5 kg (305 lb 5.4 oz)   LMP 08/12/2020   BMI 54.09 kg/m²     APPEARANCE: Well nourished, well developed, in no acute distress.    DHEA and testosterone: normal   FSH, prolactin and TSH normal     upt negative     1. PCOS (polycystic ovarian syndrome)    2. Uterine leiomyoma, unspecified location        Plan:  1. Will do yearly 2 hour GTT and cholesterol testing with PCOs. We discussed working on weight loss and losing 10 % body weight or at least 30lbs. And that she may start spontaneously ovulating again. But if not then she will need letrazole. Should start PNv. Mom had stroke with last delivery so understands important to get as healthy as possible.   2. Uterine fibroid stable .

## 2021-04-16 ENCOUNTER — PATIENT MESSAGE (OUTPATIENT)
Dept: RESEARCH | Facility: HOSPITAL | Age: 26
End: 2021-04-16

## 2021-04-27 ENCOUNTER — HOSPITAL ENCOUNTER (EMERGENCY)
Facility: HOSPITAL | Age: 26
Discharge: HOME | End: 2021-04-27
Attending: EMERGENCY MEDICINE
Payer: COMMERCIAL

## 2021-04-27 VITALS
BODY MASS INDEX: 48.73 KG/M2 | HEART RATE: 88 BPM | OXYGEN SATURATION: 99 % | HEIGHT: 63 IN | DIASTOLIC BLOOD PRESSURE: 79 MMHG | RESPIRATION RATE: 16 BRPM | SYSTOLIC BLOOD PRESSURE: 160 MMHG | WEIGHT: 275 LBS | TEMPERATURE: 98 F

## 2021-04-27 DIAGNOSIS — J02.9 VIRAL PHARYNGITIS: Primary | ICD-10-CM

## 2021-04-27 DIAGNOSIS — J02.9 SORE THROAT: ICD-10-CM

## 2021-04-27 LAB
CTP QC/QA: YES
GROUP A STREP, MOLECULAR: NEGATIVE
HCV AB SERPL QL IA: NEGATIVE
HIV 1+2 AB+HIV1 P24 AG SERPL QL IA: NEGATIVE
SARS-COV-2 RDRP RESP QL NAA+PROBE: NEGATIVE

## 2021-04-27 PROCEDURE — 87880 STREP A ASSAY W/OPTIC: CPT

## 2021-04-27 PROCEDURE — 99284 PR EMERGENCY DEPT VISIT,LEVEL IV: ICD-10-PCS | Mod: CS,,, | Performed by: EMERGENCY MEDICINE

## 2021-04-27 PROCEDURE — 86803 HEPATITIS C AB TEST: CPT | Performed by: EMERGENCY MEDICINE

## 2021-04-27 PROCEDURE — 99284 EMERGENCY DEPT VISIT MOD MDM: CPT | Mod: CS,,, | Performed by: EMERGENCY MEDICINE

## 2021-04-27 PROCEDURE — 96372 THER/PROPH/DIAG INJ SC/IM: CPT

## 2021-04-27 PROCEDURE — 86703 HIV-1/HIV-2 1 RESULT ANTBDY: CPT | Performed by: EMERGENCY MEDICINE

## 2021-04-27 PROCEDURE — 63600175 PHARM REV CODE 636 W HCPCS: Performed by: PHYSICIAN ASSISTANT

## 2021-04-27 PROCEDURE — 87651 STREP A DNA AMP PROBE: CPT | Performed by: PHYSICIAN ASSISTANT

## 2021-04-27 PROCEDURE — 99284 EMERGENCY DEPT VISIT MOD MDM: CPT | Mod: 25

## 2021-04-27 PROCEDURE — U0002 COVID-19 LAB TEST NON-CDC: HCPCS | Performed by: PHYSICIAN ASSISTANT

## 2021-04-27 RX ORDER — NAPROXEN 500 MG/1
500 TABLET ORAL 2 TIMES DAILY WITH MEALS
Qty: 15 TABLET | Refills: 0 | Status: SHIPPED | OUTPATIENT
Start: 2021-04-27 | End: 2022-01-20

## 2021-04-27 RX ORDER — DEXAMETHASONE SODIUM PHOSPHATE 4 MG/ML
12 INJECTION, SOLUTION INTRA-ARTICULAR; INTRALESIONAL; INTRAMUSCULAR; INTRAVENOUS; SOFT TISSUE
Status: COMPLETED | OUTPATIENT
Start: 2021-04-27 | End: 2021-04-27

## 2021-04-27 RX ADMIN — DEXAMETHASONE SODIUM PHOSPHATE 12 MG: 4 INJECTION INTRA-ARTICULAR; INTRALESIONAL; INTRAMUSCULAR; INTRAVENOUS; SOFT TISSUE at 09:04

## 2021-05-17 ENCOUNTER — PATIENT MESSAGE (OUTPATIENT)
Dept: OBSTETRICS AND GYNECOLOGY | Facility: CLINIC | Age: 26
End: 2021-05-17

## 2021-05-18 ENCOUNTER — HOSPITAL ENCOUNTER (EMERGENCY)
Facility: HOSPITAL | Age: 26
Discharge: HOME | End: 2021-05-18
Attending: EMERGENCY MEDICINE
Payer: COMMERCIAL

## 2021-05-18 ENCOUNTER — PATIENT MESSAGE (OUTPATIENT)
Dept: OBSTETRICS AND GYNECOLOGY | Facility: CLINIC | Age: 26
End: 2021-05-18

## 2021-05-18 VITALS
BODY MASS INDEX: 49.61 KG/M2 | HEART RATE: 114 BPM | RESPIRATION RATE: 20 BRPM | WEIGHT: 280 LBS | OXYGEN SATURATION: 99 % | DIASTOLIC BLOOD PRESSURE: 85 MMHG | SYSTOLIC BLOOD PRESSURE: 148 MMHG | TEMPERATURE: 98 F | HEIGHT: 63 IN

## 2021-05-18 DIAGNOSIS — D25.9 UTERINE LEIOMYOMA, UNSPECIFIED LOCATION: ICD-10-CM

## 2021-05-18 DIAGNOSIS — N93.8 DYSFUNCTIONAL UTERINE BLEEDING: Primary | ICD-10-CM

## 2021-05-18 LAB
ALBUMIN SERPL BCP-MCNC: 3.4 G/DL (ref 3.5–5.2)
ALP SERPL-CCNC: 54 U/L (ref 55–135)
ALT SERPL W/O P-5'-P-CCNC: 20 U/L (ref 10–44)
ANION GAP SERPL CALC-SCNC: 13 MMOL/L (ref 8–16)
AST SERPL-CCNC: 19 U/L (ref 10–40)
B-HCG UR QL: NEGATIVE
BACTERIA #/AREA URNS HPF: ABNORMAL /HPF
BASOPHILS # BLD AUTO: 0.04 K/UL (ref 0–0.2)
BASOPHILS NFR BLD: 0.5 % (ref 0–1.9)
BILIRUB SERPL-MCNC: 0.2 MG/DL (ref 0.1–1)
BILIRUB UR QL STRIP: NEGATIVE
BUN SERPL-MCNC: 8 MG/DL (ref 6–20)
CALCIUM SERPL-MCNC: 8.2 MG/DL (ref 8.7–10.5)
CHLORIDE SERPL-SCNC: 106 MMOL/L (ref 95–110)
CLARITY UR: ABNORMAL
CO2 SERPL-SCNC: 18 MMOL/L (ref 23–29)
COLOR UR: YELLOW
CREAT SERPL-MCNC: 0.8 MG/DL (ref 0.5–1.4)
CTP QC/QA: YES
DIFFERENTIAL METHOD: ABNORMAL
EOSINOPHIL # BLD AUTO: 0.1 K/UL (ref 0–0.5)
EOSINOPHIL NFR BLD: 1.4 % (ref 0–8)
ERYTHROCYTE [DISTWIDTH] IN BLOOD BY AUTOMATED COUNT: 17.8 % (ref 11.5–14.5)
EST. GFR  (AFRICAN AMERICAN): >60 ML/MIN/1.73 M^2
EST. GFR  (NON AFRICAN AMERICAN): >60 ML/MIN/1.73 M^2
GLUCOSE SERPL-MCNC: 150 MG/DL (ref 70–110)
GLUCOSE UR QL STRIP: NEGATIVE
HCT VFR BLD AUTO: 31.9 % (ref 37–48.5)
HGB BLD-MCNC: 9.8 G/DL (ref 12–16)
HGB UR QL STRIP: ABNORMAL
HYALINE CASTS #/AREA URNS LPF: 0 /LPF
IMM GRANULOCYTES # BLD AUTO: 0.02 K/UL (ref 0–0.04)
IMM GRANULOCYTES NFR BLD AUTO: 0.3 % (ref 0–0.5)
KETONES UR QL STRIP: NEGATIVE
LEUKOCYTE ESTERASE UR QL STRIP: NEGATIVE
LYMPHOCYTES # BLD AUTO: 2.2 K/UL (ref 1–4.8)
LYMPHOCYTES NFR BLD: 29.2 % (ref 18–48)
MCH RBC QN AUTO: 23.3 PG (ref 27–31)
MCHC RBC AUTO-ENTMCNC: 30.7 G/DL (ref 32–36)
MCV RBC AUTO: 76 FL (ref 82–98)
MICROSCOPIC COMMENT: ABNORMAL
MONOCYTES # BLD AUTO: 0.4 K/UL (ref 0.3–1)
MONOCYTES NFR BLD: 4.6 % (ref 4–15)
NEUTROPHILS # BLD AUTO: 4.9 K/UL (ref 1.8–7.7)
NEUTROPHILS NFR BLD: 64 % (ref 38–73)
NITRITE UR QL STRIP: NEGATIVE
NRBC BLD-RTO: 0 /100 WBC
PH UR STRIP: 5 [PH] (ref 5–8)
PLATELET # BLD AUTO: 549 K/UL (ref 150–450)
PMV BLD AUTO: 8.9 FL (ref 9.2–12.9)
POTASSIUM SERPL-SCNC: 3.6 MMOL/L (ref 3.5–5.1)
PROT SERPL-MCNC: 7.8 G/DL (ref 6–8.4)
PROT UR QL STRIP: ABNORMAL
RBC # BLD AUTO: 4.21 M/UL (ref 4–5.4)
RBC #/AREA URNS HPF: >100 /HPF (ref 0–4)
SARS-COV-2 RDRP RESP QL NAA+PROBE: NEGATIVE
SODIUM SERPL-SCNC: 137 MMOL/L (ref 136–145)
SP GR UR STRIP: 1.03 (ref 1–1.03)
SQUAMOUS #/AREA URNS HPF: 2 /HPF
URN SPEC COLLECT METH UR: ABNORMAL
UROBILINOGEN UR STRIP-ACNC: NEGATIVE EU/DL
WBC # BLD AUTO: 7.68 K/UL (ref 3.9–12.7)
WBC #/AREA URNS HPF: 20 /HPF (ref 0–5)

## 2021-05-18 PROCEDURE — 99284 EMERGENCY DEPT VISIT MOD MDM: CPT | Mod: 25

## 2021-05-18 PROCEDURE — 85025 COMPLETE CBC W/AUTO DIFF WBC: CPT | Performed by: EMERGENCY MEDICINE

## 2021-05-18 PROCEDURE — 80053 COMPREHEN METABOLIC PANEL: CPT | Performed by: EMERGENCY MEDICINE

## 2021-05-18 PROCEDURE — 81000 URINALYSIS NONAUTO W/SCOPE: CPT | Performed by: EMERGENCY MEDICINE

## 2021-05-18 PROCEDURE — 87086 URINE CULTURE/COLONY COUNT: CPT | Performed by: EMERGENCY MEDICINE

## 2021-05-18 PROCEDURE — 81025 URINE PREGNANCY TEST: CPT | Performed by: EMERGENCY MEDICINE

## 2021-05-18 PROCEDURE — U0002 COVID-19 LAB TEST NON-CDC: HCPCS | Performed by: EMERGENCY MEDICINE

## 2021-05-19 LAB — BACTERIA UR CULT: NO GROWTH

## 2021-06-02 ENCOUNTER — OFFICE VISIT (OUTPATIENT)
Dept: URGENT CARE | Facility: CLINIC | Age: 26
End: 2021-06-02
Payer: COMMERCIAL

## 2021-06-02 VITALS
TEMPERATURE: 99 F | RESPIRATION RATE: 16 BRPM | DIASTOLIC BLOOD PRESSURE: 92 MMHG | HEIGHT: 63 IN | BODY MASS INDEX: 49.61 KG/M2 | OXYGEN SATURATION: 99 % | WEIGHT: 280 LBS | HEART RATE: 94 BPM | SYSTOLIC BLOOD PRESSURE: 135 MMHG

## 2021-06-02 DIAGNOSIS — Z11.52 ENCOUNTER FOR SCREENING LABORATORY TESTING FOR COVID-19 VIRUS: ICD-10-CM

## 2021-06-02 DIAGNOSIS — J02.9 SORE THROAT: Primary | ICD-10-CM

## 2021-06-02 DIAGNOSIS — J02.0 STREP PHARYNGITIS: ICD-10-CM

## 2021-06-02 DIAGNOSIS — J34.89 SINUS PRESSURE: ICD-10-CM

## 2021-06-02 DIAGNOSIS — J30.1 SEASONAL ALLERGIC RHINITIS DUE TO POLLEN: ICD-10-CM

## 2021-06-02 DIAGNOSIS — H93.8X9 EAR CONGESTION, UNSPECIFIED LATERALITY: ICD-10-CM

## 2021-06-02 DIAGNOSIS — R51.9 SINUS HEADACHE: ICD-10-CM

## 2021-06-02 DIAGNOSIS — R09.81 SINUS CONGESTION: ICD-10-CM

## 2021-06-02 DIAGNOSIS — R06.7 SNEEZING: ICD-10-CM

## 2021-06-02 LAB
CTP QC/QA: YES
CTP QC/QA: YES
MOLECULAR STREP A: POSITIVE
SARS-COV-2 RDRP RESP QL NAA+PROBE: NEGATIVE

## 2021-06-02 PROCEDURE — 87651 STREP A DNA AMP PROBE: CPT | Mod: QW,S$GLB,, | Performed by: PHYSICIAN ASSISTANT

## 2021-06-02 PROCEDURE — U0002 COVID-19 LAB TEST NON-CDC: HCPCS | Mod: QW,S$GLB,, | Performed by: PHYSICIAN ASSISTANT

## 2021-06-02 PROCEDURE — 87651 POCT STREP A MOLECULAR: ICD-10-PCS | Mod: QW,S$GLB,, | Performed by: PHYSICIAN ASSISTANT

## 2021-06-02 PROCEDURE — 99214 PR OFFICE/OUTPT VISIT, EST, LEVL IV, 30-39 MIN: ICD-10-PCS | Mod: S$GLB,,, | Performed by: PHYSICIAN ASSISTANT

## 2021-06-02 PROCEDURE — U0002: ICD-10-PCS | Mod: QW,S$GLB,, | Performed by: PHYSICIAN ASSISTANT

## 2021-06-02 PROCEDURE — 99214 OFFICE O/P EST MOD 30 MIN: CPT | Mod: S$GLB,,, | Performed by: PHYSICIAN ASSISTANT

## 2021-06-02 PROCEDURE — 3008F BODY MASS INDEX DOCD: CPT | Mod: CPTII,S$GLB,, | Performed by: PHYSICIAN ASSISTANT

## 2021-06-02 PROCEDURE — 3008F PR BODY MASS INDEX (BMI) DOCUMENTED: ICD-10-PCS | Mod: CPTII,S$GLB,, | Performed by: PHYSICIAN ASSISTANT

## 2021-06-02 RX ORDER — FLUTICASONE PROPIONATE 50 MCG
1 SPRAY, SUSPENSION (ML) NASAL DAILY
Qty: 9.9 ML | Refills: 0 | Status: SHIPPED | OUTPATIENT
Start: 2021-06-02 | End: 2022-01-20

## 2021-06-02 RX ORDER — MINERAL OIL
180 ENEMA (ML) RECTAL DAILY
Qty: 14 TABLET | Refills: 0 | Status: SHIPPED | OUTPATIENT
Start: 2021-06-02 | End: 2022-01-20

## 2021-06-02 RX ORDER — PENICILLIN V POTASSIUM 500 MG/1
500 TABLET, FILM COATED ORAL 2 TIMES DAILY
Qty: 20 TABLET | Refills: 0 | Status: SHIPPED | OUTPATIENT
Start: 2021-06-02 | End: 2021-06-12

## 2021-07-05 ENCOUNTER — PATIENT MESSAGE (OUTPATIENT)
Dept: OBSTETRICS AND GYNECOLOGY | Facility: CLINIC | Age: 26
End: 2021-07-05

## 2021-07-06 RX ORDER — MEDROXYPROGESTERONE ACETATE 10 MG/1
TABLET ORAL
Qty: 30 TABLET | Refills: 0 | Status: SHIPPED | OUTPATIENT
Start: 2021-07-06 | End: 2022-01-20

## 2021-07-08 ENCOUNTER — TELEPHONE (OUTPATIENT)
Dept: OBSTETRICS AND GYNECOLOGY | Facility: CLINIC | Age: 26
End: 2021-07-08

## 2021-07-09 ENCOUNTER — TELEPHONE (OUTPATIENT)
Dept: OBSTETRICS AND GYNECOLOGY | Facility: CLINIC | Age: 26
End: 2021-07-09

## 2021-07-12 ENCOUNTER — PATIENT MESSAGE (OUTPATIENT)
Dept: OBSTETRICS AND GYNECOLOGY | Facility: CLINIC | Age: 26
End: 2021-07-12

## 2021-07-24 ENCOUNTER — HOSPITAL ENCOUNTER (EMERGENCY)
Facility: HOSPITAL | Age: 26
Discharge: HOME | End: 2021-07-24
Attending: EMERGENCY MEDICINE
Payer: COMMERCIAL

## 2021-07-24 VITALS
DIASTOLIC BLOOD PRESSURE: 81 MMHG | BODY MASS INDEX: 48.73 KG/M2 | RESPIRATION RATE: 20 BRPM | WEIGHT: 275 LBS | HEIGHT: 63 IN | TEMPERATURE: 98 F | HEART RATE: 89 BPM | SYSTOLIC BLOOD PRESSURE: 138 MMHG | OXYGEN SATURATION: 100 %

## 2021-07-24 DIAGNOSIS — R07.9 CHEST PAIN: ICD-10-CM

## 2021-07-24 DIAGNOSIS — U07.1 COVID-19 VIRUS DETECTED: ICD-10-CM

## 2021-07-24 DIAGNOSIS — A59.9 TRICHOMONAS VAGINALIS INFECTION: ICD-10-CM

## 2021-07-24 DIAGNOSIS — Z20.2 STD EXPOSURE: Primary | ICD-10-CM

## 2021-07-24 LAB
ALBUMIN SERPL BCP-MCNC: 3.3 G/DL (ref 3.5–5.2)
ALP SERPL-CCNC: 58 U/L (ref 55–135)
ALT SERPL W/O P-5'-P-CCNC: 22 U/L (ref 10–44)
ANION GAP SERPL CALC-SCNC: 9 MMOL/L (ref 8–16)
AST SERPL-CCNC: 24 U/L (ref 10–40)
B-HCG UR QL: NEGATIVE
BACTERIA #/AREA URNS HPF: NORMAL /HPF
BACTERIA GENITAL QL WET PREP: ABNORMAL
BASOPHILS # BLD AUTO: 0.03 K/UL (ref 0–0.2)
BASOPHILS NFR BLD: 1 % (ref 0–1.9)
BILIRUB SERPL-MCNC: 0.2 MG/DL (ref 0.1–1)
BILIRUB UR QL STRIP: NEGATIVE
BUN SERPL-MCNC: 7 MG/DL (ref 6–20)
CALCIUM SERPL-MCNC: 8.3 MG/DL (ref 8.7–10.5)
CHLORIDE SERPL-SCNC: 109 MMOL/L (ref 95–110)
CLARITY UR: ABNORMAL
CLUE CELLS VAG QL WET PREP: ABNORMAL
CO2 SERPL-SCNC: 19 MMOL/L (ref 23–29)
COLOR UR: YELLOW
CREAT SERPL-MCNC: 0.7 MG/DL (ref 0.5–1.4)
CTP QC/QA: YES
CTP QC/QA: YES
DIFFERENTIAL METHOD: ABNORMAL
EOSINOPHIL # BLD AUTO: 0.1 K/UL (ref 0–0.5)
EOSINOPHIL NFR BLD: 1.7 % (ref 0–8)
ERYTHROCYTE [DISTWIDTH] IN BLOOD BY AUTOMATED COUNT: 16.8 % (ref 11.5–14.5)
EST. GFR  (AFRICAN AMERICAN): >60 ML/MIN/1.73 M^2
EST. GFR  (NON AFRICAN AMERICAN): >60 ML/MIN/1.73 M^2
FILAMENT FUNGI VAG WET PREP-#/AREA: ABNORMAL
GLUCOSE SERPL-MCNC: 96 MG/DL (ref 70–110)
GLUCOSE UR QL STRIP: NEGATIVE
HCT VFR BLD AUTO: 35.1 % (ref 37–48.5)
HGB BLD-MCNC: 10.4 G/DL (ref 12–16)
HGB UR QL STRIP: NEGATIVE
HYALINE CASTS #/AREA URNS LPF: 0 /LPF
IMM GRANULOCYTES # BLD AUTO: 0.01 K/UL (ref 0–0.04)
IMM GRANULOCYTES NFR BLD AUTO: 0.3 % (ref 0–0.5)
KETONES UR QL STRIP: NEGATIVE
LEUKOCYTE ESTERASE UR QL STRIP: NEGATIVE
LYMPHOCYTES # BLD AUTO: 1.6 K/UL (ref 1–4.8)
LYMPHOCYTES NFR BLD: 55.2 % (ref 18–48)
MCH RBC QN AUTO: 21.6 PG (ref 27–31)
MCHC RBC AUTO-ENTMCNC: 29.6 G/DL (ref 32–36)
MCV RBC AUTO: 73 FL (ref 82–98)
MICROSCOPIC COMMENT: NORMAL
MONOCYTES # BLD AUTO: 0.3 K/UL (ref 0.3–1)
MONOCYTES NFR BLD: 11.9 % (ref 4–15)
NEUTROPHILS # BLD AUTO: 0.9 K/UL (ref 1.8–7.7)
NEUTROPHILS NFR BLD: 29.9 % (ref 38–73)
NITRITE UR QL STRIP: NEGATIVE
NRBC BLD-RTO: 0 /100 WBC
PH UR STRIP: 6 [PH] (ref 5–8)
PLATELET # BLD AUTO: 487 K/UL (ref 150–450)
PMV BLD AUTO: 8.4 FL (ref 9.2–12.9)
POTASSIUM SERPL-SCNC: 3.5 MMOL/L (ref 3.5–5.1)
PROT SERPL-MCNC: 7.6 G/DL (ref 6–8.4)
PROT UR QL STRIP: ABNORMAL
RBC # BLD AUTO: 4.82 M/UL (ref 4–5.4)
RBC #/AREA URNS HPF: 0 /HPF (ref 0–4)
SARS-COV-2 RDRP RESP QL NAA+PROBE: POSITIVE
SODIUM SERPL-SCNC: 137 MMOL/L (ref 136–145)
SP GR UR STRIP: 1.03 (ref 1–1.03)
SPECIMEN SOURCE: ABNORMAL
T VAGINALIS GENITAL QL WET PREP: ABNORMAL
TROPONIN I SERPL DL<=0.01 NG/ML-MCNC: <0.006 NG/ML (ref 0–0.03)
URN SPEC COLLECT METH UR: ABNORMAL
UROBILINOGEN UR STRIP-ACNC: NEGATIVE EU/DL
WBC # BLD AUTO: 2.86 K/UL (ref 3.9–12.7)
WBC #/AREA URNS HPF: 0 /HPF (ref 0–5)
WBC #/AREA VAG WET PREP: ABNORMAL
YEAST GENITAL QL WET PREP: ABNORMAL

## 2021-07-24 PROCEDURE — 96372 THER/PROPH/DIAG INJ SC/IM: CPT

## 2021-07-24 PROCEDURE — 81025 URINE PREGNANCY TEST: CPT | Performed by: EMERGENCY MEDICINE

## 2021-07-24 PROCEDURE — 99284 EMERGENCY DEPT VISIT MOD MDM: CPT | Mod: 25

## 2021-07-24 PROCEDURE — 84484 ASSAY OF TROPONIN QUANT: CPT | Performed by: EMERGENCY MEDICINE

## 2021-07-24 PROCEDURE — 81000 URINALYSIS NONAUTO W/SCOPE: CPT | Performed by: EMERGENCY MEDICINE

## 2021-07-24 PROCEDURE — 87591 N.GONORRHOEAE DNA AMP PROB: CPT | Performed by: EMERGENCY MEDICINE

## 2021-07-24 PROCEDURE — 87210 SMEAR WET MOUNT SALINE/INK: CPT | Performed by: EMERGENCY MEDICINE

## 2021-07-24 PROCEDURE — 80053 COMPREHEN METABOLIC PANEL: CPT | Performed by: EMERGENCY MEDICINE

## 2021-07-24 PROCEDURE — 85025 COMPLETE CBC W/AUTO DIFF WBC: CPT | Performed by: EMERGENCY MEDICINE

## 2021-07-24 PROCEDURE — 87491 CHLMYD TRACH DNA AMP PROBE: CPT | Performed by: EMERGENCY MEDICINE

## 2021-07-24 PROCEDURE — 63600175 PHARM REV CODE 636 W HCPCS: Performed by: EMERGENCY MEDICINE

## 2021-07-24 PROCEDURE — U0002 COVID-19 LAB TEST NON-CDC: HCPCS | Performed by: EMERGENCY MEDICINE

## 2021-07-24 RX ORDER — GENTAMICIN SULFATE 40 MG/ML
240 INJECTION, SOLUTION INTRAMUSCULAR; INTRAVENOUS ONCE
Status: COMPLETED | OUTPATIENT
Start: 2021-07-24 | End: 2021-07-24

## 2021-07-24 RX ORDER — DOXYCYCLINE 100 MG/1
100 CAPSULE ORAL 2 TIMES DAILY
Qty: 20 CAPSULE | Refills: 0 | Status: SHIPPED | OUTPATIENT
Start: 2021-07-24 | End: 2021-08-03

## 2021-07-24 RX ORDER — METRONIDAZOLE 500 MG/1
250 TABLET ORAL EVERY 8 HOURS
Qty: 11 TABLET | Refills: 0 | Status: SHIPPED | OUTPATIENT
Start: 2021-07-24 | End: 2021-07-31

## 2021-07-24 RX ADMIN — GENTAMICIN SULFATE 240 MG: 40 INJECTION, SOLUTION INTRAMUSCULAR; INTRAVENOUS at 06:07

## 2021-07-28 ENCOUNTER — PATIENT MESSAGE (OUTPATIENT)
Dept: OBSTETRICS AND GYNECOLOGY | Facility: CLINIC | Age: 26
End: 2021-07-28

## 2021-07-31 LAB
C TRACH DNA SPEC QL NAA+PROBE: NOT DETECTED
N GONORRHOEA DNA SPEC QL NAA+PROBE: NOT DETECTED

## 2021-10-28 ENCOUNTER — PATIENT MESSAGE (OUTPATIENT)
Dept: OBSTETRICS AND GYNECOLOGY | Facility: CLINIC | Age: 26
End: 2021-10-28
Payer: COMMERCIAL

## 2021-10-28 RX ORDER — MEDROXYPROGESTERONE ACETATE 10 MG/1
TABLET ORAL
Qty: 30 TABLET | Refills: 0 | Status: SHIPPED | OUTPATIENT
Start: 2021-10-28 | End: 2022-01-20

## 2021-11-01 ENCOUNTER — TELEPHONE (OUTPATIENT)
Dept: OBSTETRICS AND GYNECOLOGY | Facility: CLINIC | Age: 26
End: 2021-11-01
Payer: COMMERCIAL

## 2021-11-02 ENCOUNTER — OFFICE VISIT (OUTPATIENT)
Dept: OBSTETRICS AND GYNECOLOGY | Facility: CLINIC | Age: 26
End: 2021-11-02
Payer: COMMERCIAL

## 2021-11-02 VITALS
BODY MASS INDEX: 51.74 KG/M2 | DIASTOLIC BLOOD PRESSURE: 80 MMHG | WEIGHT: 292.13 LBS | SYSTOLIC BLOOD PRESSURE: 130 MMHG

## 2021-11-02 DIAGNOSIS — A59.9 TRICHOMONIASIS: ICD-10-CM

## 2021-11-02 DIAGNOSIS — N93.0 PCB (POST COITAL BLEEDING): ICD-10-CM

## 2021-11-02 DIAGNOSIS — Z12.4 PAP SMEAR FOR CERVICAL CANCER SCREENING: ICD-10-CM

## 2021-11-02 DIAGNOSIS — Z01.419 ENCOUNTER FOR ANNUAL ROUTINE GYNECOLOGICAL EXAMINATION: Primary | ICD-10-CM

## 2021-11-02 PROCEDURE — 87481 CANDIDA DNA AMP PROBE: CPT | Mod: 59 | Performed by: OBSTETRICS & GYNECOLOGY

## 2021-11-02 PROCEDURE — 88175 CYTOPATH C/V AUTO FLUID REDO: CPT | Performed by: OBSTETRICS & GYNECOLOGY

## 2021-11-02 PROCEDURE — 99395 PR PREVENTIVE VISIT,EST,18-39: ICD-10-PCS | Mod: S$GLB,,, | Performed by: OBSTETRICS & GYNECOLOGY

## 2021-11-02 PROCEDURE — 3075F PR MOST RECENT SYSTOLIC BLOOD PRESS GE 130-139MM HG: ICD-10-PCS | Mod: CPTII,S$GLB,, | Performed by: OBSTETRICS & GYNECOLOGY

## 2021-11-02 PROCEDURE — 3008F PR BODY MASS INDEX (BMI) DOCUMENTED: ICD-10-PCS | Mod: CPTII,S$GLB,, | Performed by: OBSTETRICS & GYNECOLOGY

## 2021-11-02 PROCEDURE — 1160F RVW MEDS BY RX/DR IN RCRD: CPT | Mod: CPTII,S$GLB,, | Performed by: OBSTETRICS & GYNECOLOGY

## 2021-11-02 PROCEDURE — 3008F BODY MASS INDEX DOCD: CPT | Mod: CPTII,S$GLB,, | Performed by: OBSTETRICS & GYNECOLOGY

## 2021-11-02 PROCEDURE — 99999 PR PBB SHADOW E&M-EST. PATIENT-LVL III: ICD-10-PCS | Mod: PBBFAC,,, | Performed by: OBSTETRICS & GYNECOLOGY

## 2021-11-02 PROCEDURE — 1159F MED LIST DOCD IN RCRD: CPT | Mod: CPTII,S$GLB,, | Performed by: OBSTETRICS & GYNECOLOGY

## 2021-11-02 PROCEDURE — 99395 PREV VISIT EST AGE 18-39: CPT | Mod: S$GLB,,, | Performed by: OBSTETRICS & GYNECOLOGY

## 2021-11-02 PROCEDURE — 3079F DIAST BP 80-89 MM HG: CPT | Mod: CPTII,S$GLB,, | Performed by: OBSTETRICS & GYNECOLOGY

## 2021-11-02 PROCEDURE — 1159F PR MEDICATION LIST DOCUMENTED IN MEDICAL RECORD: ICD-10-PCS | Mod: CPTII,S$GLB,, | Performed by: OBSTETRICS & GYNECOLOGY

## 2021-11-02 PROCEDURE — 3075F SYST BP GE 130 - 139MM HG: CPT | Mod: CPTII,S$GLB,, | Performed by: OBSTETRICS & GYNECOLOGY

## 2021-11-02 PROCEDURE — 1160F PR REVIEW ALL MEDS BY PRESCRIBER/CLIN PHARMACIST DOCUMENTED: ICD-10-PCS | Mod: CPTII,S$GLB,, | Performed by: OBSTETRICS & GYNECOLOGY

## 2021-11-02 PROCEDURE — 3079F PR MOST RECENT DIASTOLIC BLOOD PRESSURE 80-89 MM HG: ICD-10-PCS | Mod: CPTII,S$GLB,, | Performed by: OBSTETRICS & GYNECOLOGY

## 2021-11-02 PROCEDURE — 99999 PR PBB SHADOW E&M-EST. PATIENT-LVL III: CPT | Mod: PBBFAC,,, | Performed by: OBSTETRICS & GYNECOLOGY

## 2021-11-02 PROCEDURE — 87661 TRICHOMONAS VAGINALIS AMPLIF: CPT | Mod: 59 | Performed by: OBSTETRICS & GYNECOLOGY

## 2021-11-05 ENCOUNTER — PATIENT MESSAGE (OUTPATIENT)
Dept: OBSTETRICS AND GYNECOLOGY | Facility: CLINIC | Age: 26
End: 2021-11-05
Payer: COMMERCIAL

## 2021-11-05 LAB
BACTERIAL VAGINOSIS DNA: POSITIVE
CANDIDA GLABRATA DNA: NEGATIVE
CANDIDA KRUSEI DNA: NEGATIVE
CANDIDA RRNA VAG QL PROBE: POSITIVE
T VAGINALIS RRNA GENITAL QL PROBE: NEGATIVE

## 2021-11-08 ENCOUNTER — PATIENT MESSAGE (OUTPATIENT)
Dept: OBSTETRICS AND GYNECOLOGY | Facility: HOSPITAL | Age: 26
End: 2021-11-08
Payer: COMMERCIAL

## 2021-11-08 RX ORDER — METRONIDAZOLE 500 MG/1
500 TABLET ORAL EVERY 12 HOURS
Qty: 14 TABLET | Refills: 0 | Status: SHIPPED | OUTPATIENT
Start: 2021-11-08 | End: 2021-11-15

## 2021-11-08 RX ORDER — FLUCONAZOLE 150 MG/1
150 TABLET ORAL
Qty: 2 TABLET | Refills: 0 | Status: SHIPPED | OUTPATIENT
Start: 2021-11-08 | End: 2021-11-12

## 2021-11-11 ENCOUNTER — PATIENT MESSAGE (OUTPATIENT)
Dept: OBSTETRICS AND GYNECOLOGY | Facility: HOSPITAL | Age: 26
End: 2021-11-11
Payer: COMMERCIAL

## 2021-11-11 ENCOUNTER — PATIENT MESSAGE (OUTPATIENT)
Dept: OBSTETRICS AND GYNECOLOGY | Facility: CLINIC | Age: 26
End: 2021-11-11
Payer: COMMERCIAL

## 2021-11-11 LAB
FINAL PATHOLOGIC DIAGNOSIS: NORMAL
Lab: NORMAL

## 2021-12-30 ENCOUNTER — HOSPITAL ENCOUNTER (EMERGENCY)
Facility: HOSPITAL | Age: 26
Discharge: HOME | End: 2021-12-31
Attending: EMERGENCY MEDICINE
Payer: COMMERCIAL

## 2021-12-30 ENCOUNTER — TELEPHONE (OUTPATIENT)
Dept: OBSTETRICS AND GYNECOLOGY | Facility: CLINIC | Age: 26
End: 2021-12-30
Payer: COMMERCIAL

## 2021-12-30 ENCOUNTER — PATIENT MESSAGE (OUTPATIENT)
Dept: OBSTETRICS AND GYNECOLOGY | Facility: CLINIC | Age: 26
End: 2021-12-30
Payer: COMMERCIAL

## 2021-12-30 DIAGNOSIS — O99.891 ASYMPTOMATIC BACTERIURIA DURING PREGNANCY: ICD-10-CM

## 2021-12-30 DIAGNOSIS — R82.71 ASYMPTOMATIC BACTERIURIA DURING PREGNANCY: ICD-10-CM

## 2021-12-30 DIAGNOSIS — O26.891 ABDOMINAL PAIN DURING PREGNANCY IN FIRST TRIMESTER: Primary | ICD-10-CM

## 2021-12-30 DIAGNOSIS — R10.9 ABDOMINAL PAIN DURING PREGNANCY IN FIRST TRIMESTER: Primary | ICD-10-CM

## 2021-12-30 LAB
B-HCG UR QL: POSITIVE
BACTERIA #/AREA URNS HPF: NORMAL /HPF
BILIRUB UR QL STRIP: NEGATIVE
CLARITY UR: CLEAR
COLOR UR: YELLOW
CTP QC/QA: YES
GLUCOSE UR QL STRIP: NEGATIVE
HGB UR QL STRIP: NEGATIVE
KETONES UR QL STRIP: NEGATIVE
LEUKOCYTE ESTERASE UR QL STRIP: ABNORMAL
MICROSCOPIC COMMENT: NORMAL
NITRITE UR QL STRIP: NEGATIVE
PH UR STRIP: 7 [PH] (ref 5–8)
PROT UR QL STRIP: NEGATIVE
RBC #/AREA URNS HPF: 0 /HPF (ref 0–4)
SP GR UR STRIP: 1.01 (ref 1–1.03)
SQUAMOUS #/AREA URNS HPF: 1 /HPF
URN SPEC COLLECT METH UR: ABNORMAL
UROBILINOGEN UR STRIP-ACNC: NEGATIVE EU/DL
WBC #/AREA URNS HPF: 1 /HPF (ref 0–5)

## 2021-12-30 PROCEDURE — 96360 HYDRATION IV INFUSION INIT: CPT

## 2021-12-30 PROCEDURE — 81025 URINE PREGNANCY TEST: CPT | Performed by: EMERGENCY MEDICINE

## 2021-12-30 PROCEDURE — 81000 URINALYSIS NONAUTO W/SCOPE: CPT | Performed by: EMERGENCY MEDICINE

## 2021-12-30 PROCEDURE — 99284 EMERGENCY DEPT VISIT MOD MDM: CPT | Mod: 25

## 2021-12-30 PROCEDURE — 25000003 PHARM REV CODE 250: Performed by: EMERGENCY MEDICINE

## 2021-12-30 RX ORDER — KETOROLAC TROMETHAMINE 30 MG/ML
30 INJECTION, SOLUTION INTRAMUSCULAR; INTRAVENOUS
Status: DISCONTINUED | OUTPATIENT
Start: 2021-12-30 | End: 2021-12-30

## 2021-12-30 RX ORDER — ACETAMINOPHEN 500 MG
1000 TABLET ORAL
Status: COMPLETED | OUTPATIENT
Start: 2021-12-30 | End: 2021-12-30

## 2021-12-30 RX ORDER — SODIUM CHLORIDE 9 MG/ML
INJECTION, SOLUTION INTRAVENOUS
Status: COMPLETED | OUTPATIENT
Start: 2021-12-30 | End: 2021-12-30

## 2021-12-30 RX ADMIN — SODIUM CHLORIDE: 0.9 INJECTION, SOLUTION INTRAVENOUS at 11:12

## 2021-12-30 RX ADMIN — ACETAMINOPHEN 1000 MG: 500 TABLET ORAL at 11:12

## 2021-12-30 NOTE — Clinical Note
"Peyton Benitez"Uyen was seen and treated in our emergency department on 12/30/2021.  She may return with limitations on 12/31/2021.  Patient is currently pregnant.  Limited light duties.     Sincerely,      Grover Ferraro MD    "

## 2021-12-31 VITALS
TEMPERATURE: 98 F | OXYGEN SATURATION: 100 % | DIASTOLIC BLOOD PRESSURE: 87 MMHG | HEIGHT: 63 IN | SYSTOLIC BLOOD PRESSURE: 122 MMHG | BODY MASS INDEX: 47.84 KG/M2 | WEIGHT: 270 LBS | RESPIRATION RATE: 17 BRPM | HEART RATE: 100 BPM

## 2021-12-31 RX ORDER — NITROFURANTOIN 25; 75 MG/1; MG/1
100 CAPSULE ORAL 2 TIMES DAILY
Qty: 10 CAPSULE | Refills: 0 | Status: SHIPPED | OUTPATIENT
Start: 2021-12-31 | End: 2022-01-05

## 2021-12-31 NOTE — ED PROVIDER NOTES
Encounter Date: 12/30/2021       History     Chief Complaint   Patient presents with    Abdominal Cramping     X 3 days, +pregnancy test today, no vaginal bleeding     Pleasant 26-year-old female presents the ER for evaluation of lower abdominal cramping, onset 3 days.  No vaginal bleeding.  She endorses a positive pregnancy test at home.  No other complaints.  Came to the ER for further evaluation.        Review of patient's allergies indicates:   Allergen Reactions    Augmentin [amoxicillin-pot clavulanate] Rash    Betamethasone, augmented Diarrhea, Hives, Nausea And Vomiting and Rash     Past Medical History:   Diagnosis Date    Eczema     Seasonal allergic rhinitis     Vertigo      Past Surgical History:   Procedure Laterality Date    WISDOM TOOTH EXTRACTION       Family History   Problem Relation Age of Onset    Coronary artery disease Father     Diabetes Maternal Grandmother     Heart disease Maternal Grandmother     No Known Problems Mother      Social History     Tobacco Use    Smoking status: Never Smoker    Smokeless tobacco: Never Used   Substance Use Topics    Alcohol use: No    Drug use: Never     Review of Systems   Constitutional: Negative for fatigue and fever.   Respiratory: Negative for shortness of breath.    Cardiovascular: Negative for chest pain.   Gastrointestinal: Positive for abdominal pain. Negative for nausea and vomiting.   Genitourinary: Negative for dysuria.   All other systems reviewed and are negative.      Physical Exam     Initial Vitals [12/30/21 2011]   BP Pulse Resp Temp SpO2   (!) 143/94 104 18 99.1 °F (37.3 °C) 100 %      MAP       --         Physical Exam    Nursing note and vitals reviewed.  Constitutional: She appears well-developed and well-nourished.   HENT:   Head: Normocephalic and atraumatic.   Eyes: Pupils are equal, round, and reactive to light.   Pulmonary/Chest: No respiratory distress.   Abdominal: Abdomen is soft.   Musculoskeletal:         General:  Normal range of motion.     Neurological: She is alert and oriented to person, place, and time. She has normal strength. GCS score is 15. GCS eye subscore is 4. GCS verbal subscore is 5. GCS motor subscore is 6.   Skin: Skin is warm and dry. Capillary refill takes less than 2 seconds.   Psychiatric: She has a normal mood and affect. Thought content normal.         ED Course   Procedures  Labs Reviewed   URINALYSIS, REFLEX TO URINE CULTURE - Abnormal; Notable for the following components:       Result Value    Leukocytes, UA 1+ (*)     All other components within normal limits    Narrative:     Specimen Source->Urine   POCT URINE PREGNANCY - Abnormal; Notable for the following components:    POC Preg Test, Ur Positive (*)     All other components within normal limits   URINALYSIS MICROSCOPIC    Narrative:     Specimen Source->Urine          Imaging Results    None          Medications   0.9%  NaCl infusion ( Intravenous Stopped 21 9480)   acetaminophen tablet 1,000 mg (1,000 mg Oral Given 21 7104)     Medical Decision Making:   Initial Assessment:   Pleasant 26-year-old female  presents the ER for evaluation of abdominal cramping.  Onset today no fever chills vaginal bleeding or discharge.  Came to the ER for further evaluation.  Abdomen soft nontender physical exam overall reassuring.  Bedside ultrasound performed by myself revealed early viable intrauterine pregnancy with gestational sac and fetal pole.  Patient was given IV fluids and Tylenol with great improvement in symptoms.  I discussed with patient her diagnosis of pregnancy she is happy.  She will follow up with her OBGYN , she has a hx of pcos.  She is not currently on hormone therapy.  She is on metformin, which is pregnancy class B, and take Sudafed for sinus is which is pregnancy class C. She does have some mild asymptomatic bacteriuria given pregnancy will discharge with Macrobid.  Will follow up with OBGYN will give work note.  Patient  will be discharged.  Patient understood agreed with plan.                            Clinical Impression:   Final diagnoses:  [O26.891, R10.9] Abdominal pain during pregnancy in first trimester (Primary)  [O99.891, R82.71] Asymptomatic bacteriuria during pregnancy          ED Disposition Condition    Discharge Stable        ED Prescriptions     Medication Sig Dispense Start Date End Date Auth. Provider    nitrofurantoin, macrocrystal-monohydrate, (MACROBID) 100 MG capsule Take 1 capsule (100 mg total) by mouth 2 (two) times daily. for 5 days 10 capsule 12/31/2021 1/5/2022 Grover Ferraro MD        Follow-up Information     Follow up With Specialties Details Why Contact Info    Maki Gomes MD Obstetrics and Gynecology Schedule an appointment as soon as possible for a visit  As needed [address] [place] [id number]  [phone]             Grover Ferraro MD  12/31/21 0252

## 2021-12-31 NOTE — DISCHARGE INSTRUCTIONS
Please take prenatal vitamins.  Please follow-up with your OBGYN.  It is safe to take metformin in your pregnancy.  You have some bacteria in your urine, will start antibiotics.

## 2022-01-02 NOTE — TELEPHONE ENCOUNTER
Patient needs a pregnancy confirmation appointment. Her LMP was 11/8/21. She has had two positive pregnancy test. She would like an appointment soon. Please advise.    Annamaria Starks MA  01/02/2022 10:26 AM

## 2022-01-04 ENCOUNTER — TELEPHONE (OUTPATIENT)
Dept: OBSTETRICS AND GYNECOLOGY | Facility: CLINIC | Age: 27
End: 2022-01-04
Payer: COMMERCIAL

## 2022-01-04 DIAGNOSIS — Z32.01 POSITIVE PREGNANCY TEST: Primary | ICD-10-CM

## 2022-01-04 NOTE — TELEPHONE ENCOUNTER
----- Message from Zak Martins sent at 1/4/2022  9:18 AM CST -----  Type:  Needs Medical Advice    Who Called: self  Reason:schedule sooner appointment for preg confirmation   Would the patient rather a call back or a response via [employer]? call  Best Call Back Number: [phone]  Additional Information: none

## 2022-01-04 NOTE — TELEPHONE ENCOUNTER
Called pt to set up pregnancy confirmation apt. LMP: 11/09/21. Set up for dating u/s on 01/14/22 at 10:30 am then first visit with us on 01/20. Needs viewpoint order, pended.

## 2022-01-04 NOTE — TELEPHONE ENCOUNTER
Ok. Put order in. In the future, I try to do these first ultrasounds on days when I am going to be there in case there is any issues. Thanks!

## 2022-01-14 ENCOUNTER — PROCEDURE VISIT (OUTPATIENT)
Dept: OBSTETRICS AND GYNECOLOGY | Facility: CLINIC | Age: 27
End: 2022-01-14
Payer: COMMERCIAL

## 2022-01-14 DIAGNOSIS — Z32.01 POSITIVE PREGNANCY TEST: ICD-10-CM

## 2022-01-14 PROCEDURE — 76801 US OB/GYN PROCEDURE (VIEWPOINT): ICD-10-PCS | Mod: S$GLB,,, | Performed by: OBSTETRICS & GYNECOLOGY

## 2022-01-14 PROCEDURE — 76801 OB US < 14 WKS SINGLE FETUS: CPT | Mod: S$GLB,,, | Performed by: OBSTETRICS & GYNECOLOGY

## 2022-01-18 ENCOUNTER — PATIENT MESSAGE (OUTPATIENT)
Dept: OBSTETRICS AND GYNECOLOGY | Facility: CLINIC | Age: 27
End: 2022-01-18
Payer: COMMERCIAL

## 2022-01-20 ENCOUNTER — LAB VISIT (OUTPATIENT)
Dept: LAB | Facility: HOSPITAL | Age: 27
End: 2022-01-20
Attending: OBSTETRICS & GYNECOLOGY
Payer: COMMERCIAL

## 2022-01-20 ENCOUNTER — OFFICE VISIT (OUTPATIENT)
Dept: OBSTETRICS AND GYNECOLOGY | Facility: CLINIC | Age: 27
End: 2022-01-20
Payer: COMMERCIAL

## 2022-01-20 VITALS
WEIGHT: 292.81 LBS | SYSTOLIC BLOOD PRESSURE: 134 MMHG | DIASTOLIC BLOOD PRESSURE: 86 MMHG | BODY MASS INDEX: 51.87 KG/M2

## 2022-01-20 DIAGNOSIS — E66.01 CLASS 3 SEVERE OBESITY WITH BODY MASS INDEX (BMI) OF 50.0 TO 59.9 IN ADULT, UNSPECIFIED OBESITY TYPE, UNSPECIFIED WHETHER SERIOUS COMORBIDITY PRESENT: ICD-10-CM

## 2022-01-20 DIAGNOSIS — E28.2 PCOS (POLYCYSTIC OVARIAN SYNDROME): ICD-10-CM

## 2022-01-20 DIAGNOSIS — Z32.01 POSITIVE PREGNANCY TEST: Primary | ICD-10-CM

## 2022-01-20 DIAGNOSIS — Z13.79 GENETIC SCREENING: ICD-10-CM

## 2022-01-20 DIAGNOSIS — D25.9 UTERINE LEIOMYOMA, UNSPECIFIED LOCATION: ICD-10-CM

## 2022-01-20 PROBLEM — E66.813 CLASS 3 SEVERE OBESITY WITH BODY MASS INDEX (BMI) OF 50.0 TO 59.9 IN ADULT: Status: ACTIVE | Noted: 2022-01-20

## 2022-01-20 LAB
B-HCG UR QL: POSITIVE
CTP QC/QA: YES

## 2022-01-20 PROCEDURE — 1160F RVW MEDS BY RX/DR IN RCRD: CPT | Mod: CPTII,S$GLB,, | Performed by: OBSTETRICS & GYNECOLOGY

## 2022-01-20 PROCEDURE — 1159F PR MEDICATION LIST DOCUMENTED IN MEDICAL RECORD: ICD-10-PCS | Mod: CPTII,S$GLB,, | Performed by: OBSTETRICS & GYNECOLOGY

## 2022-01-20 PROCEDURE — 87491 CHLMYD TRACH DNA AMP PROBE: CPT | Performed by: OBSTETRICS & GYNECOLOGY

## 2022-01-20 PROCEDURE — 99999 PR PBB SHADOW E&M-EST. PATIENT-LVL III: ICD-10-PCS | Mod: PBBFAC,,, | Performed by: OBSTETRICS & GYNECOLOGY

## 2022-01-20 PROCEDURE — 87086 URINE CULTURE/COLONY COUNT: CPT | Performed by: OBSTETRICS & GYNECOLOGY

## 2022-01-20 PROCEDURE — 81025 POCT URINE PREGNANCY: ICD-10-PCS | Mod: S$GLB,,, | Performed by: OBSTETRICS & GYNECOLOGY

## 2022-01-20 PROCEDURE — 87591 N.GONORRHOEAE DNA AMP PROB: CPT | Performed by: OBSTETRICS & GYNECOLOGY

## 2022-01-20 PROCEDURE — 82950 GLUCOSE TEST: CPT | Performed by: OBSTETRICS & GYNECOLOGY

## 2022-01-20 PROCEDURE — 3079F PR MOST RECENT DIASTOLIC BLOOD PRESSURE 80-89 MM HG: ICD-10-PCS | Mod: CPTII,S$GLB,, | Performed by: OBSTETRICS & GYNECOLOGY

## 2022-01-20 PROCEDURE — 3008F PR BODY MASS INDEX (BMI) DOCUMENTED: ICD-10-PCS | Mod: CPTII,S$GLB,, | Performed by: OBSTETRICS & GYNECOLOGY

## 2022-01-20 PROCEDURE — 1159F MED LIST DOCD IN RCRD: CPT | Mod: CPTII,S$GLB,, | Performed by: OBSTETRICS & GYNECOLOGY

## 2022-01-20 PROCEDURE — 36415 COLL VENOUS BLD VENIPUNCTURE: CPT | Performed by: OBSTETRICS & GYNECOLOGY

## 2022-01-20 PROCEDURE — 3008F BODY MASS INDEX DOCD: CPT | Mod: CPTII,S$GLB,, | Performed by: OBSTETRICS & GYNECOLOGY

## 2022-01-20 PROCEDURE — 3075F SYST BP GE 130 - 139MM HG: CPT | Mod: CPTII,S$GLB,, | Performed by: OBSTETRICS & GYNECOLOGY

## 2022-01-20 PROCEDURE — 3075F PR MOST RECENT SYSTOLIC BLOOD PRESS GE 130-139MM HG: ICD-10-PCS | Mod: CPTII,S$GLB,, | Performed by: OBSTETRICS & GYNECOLOGY

## 2022-01-20 PROCEDURE — 3079F DIAST BP 80-89 MM HG: CPT | Mod: CPTII,S$GLB,, | Performed by: OBSTETRICS & GYNECOLOGY

## 2022-01-20 PROCEDURE — 99214 PR OFFICE/OUTPT VISIT, EST, LEVL IV, 30-39 MIN: ICD-10-PCS | Mod: S$GLB,,, | Performed by: OBSTETRICS & GYNECOLOGY

## 2022-01-20 PROCEDURE — 1160F PR REVIEW ALL MEDS BY PRESCRIBER/CLIN PHARMACIST DOCUMENTED: ICD-10-PCS | Mod: CPTII,S$GLB,, | Performed by: OBSTETRICS & GYNECOLOGY

## 2022-01-20 PROCEDURE — 99214 OFFICE O/P EST MOD 30 MIN: CPT | Mod: S$GLB,,, | Performed by: OBSTETRICS & GYNECOLOGY

## 2022-01-20 PROCEDURE — 81025 URINE PREGNANCY TEST: CPT | Mod: S$GLB,,, | Performed by: OBSTETRICS & GYNECOLOGY

## 2022-01-20 PROCEDURE — 99999 PR PBB SHADOW E&M-EST. PATIENT-LVL III: CPT | Mod: PBBFAC,,, | Performed by: OBSTETRICS & GYNECOLOGY

## 2022-01-20 NOTE — PROGRESS NOTES
CC: positive pregnancy test     HPI:   Peyton Qiu 26 y.o.  at 10 3/7 wga by 9 week US is here for + UPT. She hasn't seen anyone yet for this pregnancy. She has no complaints.     OB history: g1     Patient's last menstrual period was 2021.     Past Medical History:   Diagnosis Date    Eczema     Seasonal allergic rhinitis     Vertigo        Past Surgical History:   Procedure Laterality Date    WISDOM TOOTH EXTRACTION         Family History   Problem Relation Age of Onset    Coronary artery disease Father     Diabetes Maternal Grandmother     Heart disease Maternal Grandmother     No Known Problems Mother        Social History     Socioeconomic History    Marital status: Single   Tobacco Use    Smoking status: Never Smoker    Smokeless tobacco: Never Used   Substance and Sexual Activity    Alcohol use: No    Drug use: Never    Sexual activity: Yes     Partners: Male       OB History        0    Para   0    Term   0       0    AB   0    Living   0       SAB   0    IAB   0    Ectopic   0    Multiple   0    Live Births   0                  COMPREHENSIVE GYN HISTORY:  PAP History: Denies abnormal Paps; 2021 NILM  Infection History: trich treated  Denies PID.  Benign History:Denies uterine fibroids. Denies ovarian cysts. Denies endometriosis. Has PCOS  Cancer History: Denies cervical cancer. Denies uterine cancer or hyperplasia. Denies ovarian cancer. Denies vulvar cancer or pre-cancer. Denies vaginal cancer or pre-cancer. Denies breast cancer. Denies colon cancer.  Cycle: 12/irregular   Was on ocps, currently not on anything and is sexually active   Had hpv vaccine      ROS:  GENERAL: Denies weight gain or weight loss. Feeling well overall.   SKIN: Denies rash or lesions.   HEAD: Denies headache.   NODES: Denies enlarged lymph nodes.   CHEST: Denies shortness of breath or chest pain   ABDOMEN: No abdominal pain, constipation, diarrhea, nausea, vomiting or  rectal bleeding.   URINARY: No frequency, dysuria, hematuria, or burning on urination.  REPRODUCTIVE: See HPI.   BREASTS: The patient denies pain, lumps, or nipple discharge.   HEMATOLOGIC: No easy bruisability.   MUSCULOSKELETAL: Denies joint pain or back pain.   NEUROLOGIC: Denies weakness.   PSYCHIATRIC: Denies depression, anxiety or mood swings.    PE:   /86   Wt 132.8 kg (292 lb 12.8 oz)   LMP 2021   BMI 51.87 kg/m²     APPEARANCE: Well nourished, well developed, in no acute distress.  NECK: Neck symmetric without  thyromegaly.    PELVIC: up to date    Bedside US: IUP with     1. Positive pregnancy test    2. PCOS (polycystic ovarian syndrome)    3. Class 3 severe obesity with body mass index (BMI) of 50.0 to 59.9 in adult, unspecified obesity type, unspecified whether serious comorbidity present    4. Uterine leiomyoma, unspecified location    5. Genetic screening        Plan:    1. IOB labs and dating US done and targeted anatomy USordered, PNV taking  2 see back in 4 weeks.   3. Discussed with patient the way the office works and how started prenatal care goes in pregnancy with visits. That we are 1/2 men and women. I will do my best to be there at the delivery but if I can't make it that there would be another physician there who could care for them. She reports her job requires her to take FMLA to have time off to come to appointments. She is going to get us paper work   4. PCOS/insulin resistance: Stopped metformin, will get A1c and 1 hour GTT  5. Counseled on weight gain in pregnancy. With her BMI of >30 that we recommend gaining 11-20. If obese, A1C or 2 hr GTT added to labs and will get growth US is the 3rd trimester. Will start  testing at 34 wga since BMI >45. Discussed increased risk of  pre-e and GDM. Will add 81mg ASA when >12 wga.    6. Counseled on the risks of aneuploidy in pregnancy, including down syndrome.   -We discussed the risks and benefits of screening  tests versus definitive genetic testing ([facility] and amniocentesis).  We discussed the limitations of ultrasound in the definitive diagnosis of Down Syndrome and other conditions.  I also discussed with the patient the option of non-invasive prenataltesting, such as Nt/first trimester screening, Quad screen, Materni T21. She desires Mt21.  7. Declines covid and flu shot, understands serious nature ofcovid in pregnancy including intubation and death and adverse fetal outcomes. Discussed recommendation that if not vaccinated should wear N95 and avoid crowds and even large family gatherings.   8. Discussed uterine fibroid and issues that can arise such as necrosis, pain or affecting fetal growth, will monitor, may want to have it removed after delivery.

## 2022-01-21 LAB
BACTERIA UR CULT: NO GROWTH
GLUCOSE SERPL-MCNC: 129 MG/DL (ref 70–140)

## 2022-01-22 LAB
C TRACH DNA SPEC QL NAA+PROBE: NOT DETECTED
N GONORRHOEA DNA SPEC QL NAA+PROBE: NOT DETECTED

## 2022-01-24 ENCOUNTER — PATIENT MESSAGE (OUTPATIENT)
Dept: OBSTETRICS AND GYNECOLOGY | Facility: CLINIC | Age: 27
End: 2022-01-24
Payer: COMMERCIAL

## 2022-01-24 ENCOUNTER — PATIENT MESSAGE (OUTPATIENT)
Dept: OBSTETRICS AND GYNECOLOGY | Facility: HOSPITAL | Age: 27
End: 2022-01-24
Payer: COMMERCIAL

## 2022-01-26 ENCOUNTER — PATIENT MESSAGE (OUTPATIENT)
Dept: OBSTETRICS AND GYNECOLOGY | Facility: CLINIC | Age: 27
End: 2022-01-26
Payer: COMMERCIAL

## 2022-01-27 ENCOUNTER — PATIENT MESSAGE (OUTPATIENT)
Dept: OBSTETRICS AND GYNECOLOGY | Facility: CLINIC | Age: 27
End: 2022-01-27
Payer: COMMERCIAL

## 2022-01-27 ENCOUNTER — PATIENT MESSAGE (OUTPATIENT)
Dept: OBSTETRICS AND GYNECOLOGY | Facility: HOSPITAL | Age: 27
End: 2022-01-27
Payer: COMMERCIAL

## 2022-01-27 DIAGNOSIS — D64.9 ANEMIA, UNSPECIFIED TYPE: Primary | ICD-10-CM

## 2022-01-27 RX ORDER — FERROUS SULFATE 325(65) MG
325 TABLET, DELAYED RELEASE (ENTERIC COATED) ORAL EVERY OTHER DAY
Qty: 30 TABLET | Refills: 3 | Status: SHIPPED | OUTPATIENT
Start: 2022-01-27 | End: 2022-04-27

## 2022-01-31 ENCOUNTER — PATIENT MESSAGE (OUTPATIENT)
Dept: OBSTETRICS AND GYNECOLOGY | Facility: CLINIC | Age: 27
End: 2022-01-31
Payer: COMMERCIAL

## 2022-01-31 RX ORDER — FLUCONAZOLE 150 MG/1
150 TABLET ORAL
Qty: 2 TABLET | Refills: 0 | Status: SHIPPED | OUTPATIENT
Start: 2022-01-31 | End: 2022-02-04

## 2022-01-31 RX ORDER — TERCONAZOLE 4 MG/G
1 CREAM VAGINAL DAILY
Qty: 45 G | Refills: 0 | Status: ON HOLD | OUTPATIENT
Start: 2022-01-31 | End: 2022-08-12 | Stop reason: HOSPADM

## 2022-02-16 ENCOUNTER — ROUTINE PRENATAL (OUTPATIENT)
Dept: OBSTETRICS AND GYNECOLOGY | Facility: CLINIC | Age: 27
End: 2022-02-16
Payer: COMMERCIAL

## 2022-02-16 VITALS
BODY MASS INDEX: 51.67 KG/M2 | SYSTOLIC BLOOD PRESSURE: 126 MMHG | WEIGHT: 291.69 LBS | DIASTOLIC BLOOD PRESSURE: 74 MMHG

## 2022-02-16 DIAGNOSIS — E66.9 OBESITY, UNSPECIFIED CLASSIFICATION, UNSPECIFIED OBESITY TYPE, UNSPECIFIED WHETHER SERIOUS COMORBIDITY PRESENT: ICD-10-CM

## 2022-02-16 DIAGNOSIS — O36.80X0 PREGNANCY OF UNKNOWN ANATOMIC LOCATION: Primary | ICD-10-CM

## 2022-02-16 DIAGNOSIS — Z36.89 ENCOUNTER FOR FETAL ANATOMIC SURVEY: ICD-10-CM

## 2022-02-16 PROCEDURE — 99999 PR PBB SHADOW E&M-EST. PATIENT-LVL II: CPT | Mod: PBBFAC,,, | Performed by: OBSTETRICS & GYNECOLOGY

## 2022-02-16 PROCEDURE — 99999 PR PBB SHADOW E&M-EST. PATIENT-LVL II: ICD-10-PCS | Mod: PBBFAC,,, | Performed by: OBSTETRICS & GYNECOLOGY

## 2022-02-16 PROCEDURE — 0502F PR SUBSEQUENT PRENATAL CARE: ICD-10-PCS | Mod: CPTII,S$GLB,, | Performed by: OBSTETRICS & GYNECOLOGY

## 2022-02-16 PROCEDURE — 0502F SUBSEQUENT PRENATAL CARE: CPT | Mod: CPTII,S$GLB,, | Performed by: OBSTETRICS & GYNECOLOGY

## 2022-02-16 RX ORDER — PROMETHAZINE HYDROCHLORIDE 25 MG/1
25 TABLET ORAL
Qty: 30 TABLET | Refills: 1 | Status: SHIPPED | OUTPATIENT
Start: 2022-02-16 | End: 2022-03-18

## 2022-02-16 NOTE — PROGRESS NOTES
C/o nausea, no emesis but is having dry heaving and some GERD   FHT: 150     @  14 2/ wga  1. iob reviewed; enrolled in connected moms; declines covid and flu vaccine; MT 21 negative   2. Anemia: on iron   3. Obesity: Counseled on weight gain in pregnancy. Discussed that at her BMI of >30 that we recommend gaining 11-20. Will do targeted US. Will start  testing since BMI >45. Early GTT normal   - on ASA

## 2022-02-17 ENCOUNTER — PATIENT MESSAGE (OUTPATIENT)
Dept: ADMINISTRATIVE | Facility: OTHER | Age: 27
End: 2022-02-17
Payer: COMMERCIAL

## 2022-02-18 ENCOUNTER — PATIENT MESSAGE (OUTPATIENT)
Dept: OBSTETRICS AND GYNECOLOGY | Facility: CLINIC | Age: 27
End: 2022-02-18
Payer: COMMERCIAL

## 2022-02-18 DIAGNOSIS — M53.3 SI (SACROILIAC) PAIN: Primary | ICD-10-CM

## 2022-02-28 ENCOUNTER — PATIENT MESSAGE (OUTPATIENT)
Dept: OBSTETRICS AND GYNECOLOGY | Facility: CLINIC | Age: 27
End: 2022-02-28
Payer: COMMERCIAL

## 2022-03-21 ENCOUNTER — ROUTINE PRENATAL (OUTPATIENT)
Dept: OBSTETRICS AND GYNECOLOGY | Facility: CLINIC | Age: 27
End: 2022-03-21
Payer: COMMERCIAL

## 2022-03-21 ENCOUNTER — PROCEDURE VISIT (OUTPATIENT)
Dept: OBSTETRICS AND GYNECOLOGY | Facility: CLINIC | Age: 27
End: 2022-03-21
Payer: COMMERCIAL

## 2022-03-21 VITALS
DIASTOLIC BLOOD PRESSURE: 76 MMHG | SYSTOLIC BLOOD PRESSURE: 122 MMHG | WEIGHT: 290.69 LBS | BODY MASS INDEX: 51.49 KG/M2

## 2022-03-21 DIAGNOSIS — Z3A.19 19 WEEKS GESTATION OF PREGNANCY: Primary | ICD-10-CM

## 2022-03-21 DIAGNOSIS — E28.2 PCOS (POLYCYSTIC OVARIAN SYNDROME): ICD-10-CM

## 2022-03-21 DIAGNOSIS — E66.01 CLASS 3 SEVERE OBESITY WITH BODY MASS INDEX (BMI) OF 50.0 TO 59.9 IN ADULT, UNSPECIFIED OBESITY TYPE, UNSPECIFIED WHETHER SERIOUS COMORBIDITY PRESENT: ICD-10-CM

## 2022-03-21 DIAGNOSIS — Z36.2 ENCOUNTER FOR FOLLOW-UP ULTRASOUND OF FETAL ANATOMY: ICD-10-CM

## 2022-03-21 PROCEDURE — 99999 PR PBB SHADOW E&M-EST. PATIENT-LVL II: ICD-10-PCS | Mod: PBBFAC,,, | Performed by: OBSTETRICS & GYNECOLOGY

## 2022-03-21 PROCEDURE — 76811 US OB/GYN PROCEDURE (VIEWPOINT): ICD-10-PCS | Mod: S$GLB,,, | Performed by: OBSTETRICS & GYNECOLOGY

## 2022-03-21 PROCEDURE — 99999 PR PBB SHADOW E&M-EST. PATIENT-LVL II: CPT | Mod: PBBFAC,,, | Performed by: OBSTETRICS & GYNECOLOGY

## 2022-03-21 PROCEDURE — 0502F SUBSEQUENT PRENATAL CARE: CPT | Mod: CPTII,S$GLB,, | Performed by: OBSTETRICS & GYNECOLOGY

## 2022-03-21 PROCEDURE — 0502F PR SUBSEQUENT PRENATAL CARE: ICD-10-PCS | Mod: CPTII,S$GLB,, | Performed by: OBSTETRICS & GYNECOLOGY

## 2022-03-21 PROCEDURE — 76811 OB US DETAILED SNGL FETUS: CPT | Mod: S$GLB,,, | Performed by: OBSTETRICS & GYNECOLOGY

## 2022-03-21 NOTE — PROGRESS NOTES
C/o nausea, no emesis but is having dry heaving and some GERD   FHT: 150   @  19 0/7 wga  1. iob reviewed; enrolled in connected moms; declines covid and flu vaccine; MT 21 negative; anatomy set up today    2. Anemia: on iron   3. Obesity: did targeted US.  Early GTT normal   -Will start  testing since BMI >45. At 34 weels   - on ASA

## 2022-03-25 ENCOUNTER — CLINICAL SUPPORT (OUTPATIENT)
Dept: REHABILITATION | Facility: HOSPITAL | Age: 27
End: 2022-03-25
Attending: OBSTETRICS & GYNECOLOGY
Payer: COMMERCIAL

## 2022-03-25 DIAGNOSIS — M53.3 SI (SACROILIAC) PAIN: ICD-10-CM

## 2022-03-25 DIAGNOSIS — M79.10 MYALGIA: ICD-10-CM

## 2022-03-25 DIAGNOSIS — R53.1 WEAKNESS: ICD-10-CM

## 2022-03-25 PROCEDURE — 97110 THERAPEUTIC EXERCISES: CPT | Mod: PO

## 2022-03-25 PROCEDURE — 97530 THERAPEUTIC ACTIVITIES: CPT | Mod: PO

## 2022-03-25 PROCEDURE — 97161 PT EVAL LOW COMPLEX 20 MIN: CPT | Mod: PO

## 2022-03-25 NOTE — PLAN OF CARE
[facility]  Pelvic Health Physical Therapy Initial Evaluation    Date: 3/25/2022   Name: Peyton Qiu  Clinic Number: [id number]  Therapy Diagnosis:   Encounter Diagnoses   Name Primary?    SI (sacroiliac) pain     Weakness     Myalgia      Physician: Maki Gomes MD    Physician Orders: Pelvic PT Eval and Treat  Medical Diagnosis from Referral: SI (sacroiliac) pain [M53.3]  Evaluation Date: 3/25/2022  Authorization Period Expiration: 5/1/22  Plan of Care Expiration: 07/15/22  Visit # / Visits authorized: 1/ 1    Time In: 1:00p  Time Out: 2:05p  Total Appointment Time (timed & untimed codes): 65 minutes    Precautions: universal, pregnant    Subjective     Date of onset: around 12 weeks gestation    History of current condition - Peyton reports: She is 19 weeks pregnant with a baby boy. This is her first pregnancy. She is a [employer] [place] [employer] on light desk duty until after maternity leave. She reports to PT with complaints of low back and hip pain, as well as radiating sciatic nerve like pain as low as the calves. She feels it constantly to some extent. Made worse with prolonged standing or walking, as well as standing up after sitting for a long period of time. Spinal extension exercises seem to be bothersome, where spinal flexion and slouching seems to provide some relief. The pain radiates around to her lateral hip bilaterally. Will sometimes feel low back popping that feels relieving. C/o constant fatigue and poor sleep quality due to pain and nocturia. Usually naps between 4-6pm, able to fall asleep at 9pm. Wakes at 4am for work. Plans on vaginal delivery, due in August. Reports that she has fibroids on the left that will be removed shortly after giving birth. Only has 6 weeks of maternity leave.    OB/GYN History:  first pregnancy  Sexually active? yes  Pain with vaginal exams, intercourse or tampon use? No    Bladder/Bowel History:    Frequency of urination:    Daytime: every few hours           Nighttime: twice per night, somewhat bothersome (poor sleep quality already due to pain)   Difficulty initiating urine stream: No   Urine stream: strong   Complete emptying: Yes   Bladder leakage: No   Frequency of incidents: none   Amount leaked (urine): none   Urinary Urgency: No    Frequency of bowel movements: 1 to 3 times a day   Difficulty initiating BM: No   Quality/Shape of BM: solid but easy to pass   Does Patient Feel Empty after BM? Yes   Fiber Supplements or Laxative Use?  No, taking B12 and B6, prenatals, iron   Colon leakage: No    Prior Therapy/Previous treatment included: none  Social History: lives with their family  Current exercise: not right now, as she gets nauseated by prolonged activity. 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  Prior Level of Function: independent  Current Level of Function: independent but with increased pain and dysfunction    Types of fluid intake: fruit water, teas, crystal light packets  Diet: well rounded  Habitus:well developed, well nourished  Abuse/Neglect: No     PAIN:  Location: lumbar/B SI/B hips/sciatic nerve distribution   Current 5/10, worst 8/10, best 3/10   Description: Burning, Tight, Tingling, Electric and Shooting  Aggravating Factors/Activities that cause symptoms: Prolonged standing, Prolonged sitting, Walking and Exercise   Easing Factors: slouching, gentle movement     Medical History: Peyton  has a past medical history of Eczema, Seasonal allergic rhinitis, and Vertigo.     Surgical History:  Peyton Steinbergwarrenshilpi Qiu  has a past surgical history that includes [place] tooth extraction.    Medications: Peyton has a current medication list which includes the following prescription(s): ferrous sulfate and terconazole.    Allergies:   Review of patient's allergies indicates:   Allergen Reactions    Augmentin  [amoxicillin-pot clavulanate] Rash    Betamethasone, augmented Diarrhea, Hives, Nausea And Vomiting and Rash        Imaging See EMR for full imaging workup    Pts goals: To not hurt anymore and to stay at work as close to due date as possible    OBJECTIVE     ORTHO SCREEN  Posture in sitting: slouched   Posture in standing: forward head and forward and rounded shoulders   Pelvic alignment: no sign of deviations noted in supine   SI Joint Palpation: Tenderness to the right SI joint palpation. Tenderness to the left SI joint palpation.  Sacral spring test: not assessed (Positive=NO spring)  Palpation: Tender to B glute med/TFL/piriformis and increased tension    LUMBAR SPINE AROM:   Flexion: 25% limited   Extension: 50% limited   Left Sidebend: 50% limited and painful   Right Sidebend: 25% limited   Left Rotation: 25% limited and painful   Right Rotation: 25% limited     REPEATED MOTIONS - 5x  Flexion: Improved symptoms in sitting   Extension: Exacerbated symptoms       LOWER EXTREMITY STRENGTH:   Left Right   Quadriceps/knee extension 4/5 4+/5     Hip abduction 4-/5 4-/5   Hip flexion 4-/5 painful 4/5       GAIT: Peyton ambulates with no assistive device with independently.     GAIT DEVIATIONS: Peyton displays B trendelenburg, increased B support time, antalgic trunk lean    Functional Tests:    Squat: unable  -SLS: >10 sec bilaterally with sway, B trendelenburg  -Sit to stand: independent, decreased forward trunk lean    VAGINAL PELVIC FLOOR EXAM    Deferred, not indicated.      Limitation/Restriction for FOTO Survey    Therapist reviewed FOTO scores for Peyton Qiu on 3/25/2022.   FOTO documents entered into [employer] - see Media section.    Limitation Score: na% not obtained       TREATMENT     Treatment Time In: 1:40  Treatment Time Out: 2:05  Total Treatment time (time-based codes) separate from Evaluation: 25 minutes      Therapeutic Exercise to develop  strength, endurance, ROM and flexibility  for 15 minutes including: sciatic nerve glide 3, repeated flexion in sitting, supine clamshell, B figure 4 stretch, B self-glute release on wall with tennis ball    Therapeutic Activity Patient participated in dynamic functional therapeutic activities to improve functional performance for 10 minutes. Including: Education as described below.     Patient Education provided:   general anatomy/physiology of urinary/ bowel  system, benefits of treatment, risks of treatment and alternative methods of treatment were discussed with the pt. Additionally, mobility vs stability, PT role and POC were reviewed.     Home Exercises Provided:  Written Home Exercises Provided: yes.  Exercises were reviewed and Peyton was able to demonstrate them prior to the end of the session.    Peyton demonstrated good  understanding of the education provided.     See EMR under Patient Instructions for exercises provided 3/25/2022.    Assessment     Peyton is a 26 y.o. female referred to outpatient Physical Therapy with a medical diagnosis of SI pain. Pt presents with altered posture, poor knowledge of body mechanics and posture, poor trunk stability, increased nocturia and muscular imbalances and weakness. I believe she is most limited by weakness and muscle tension leading to imbalances in her low back and pelvis, as well as increased pain and stiffness. Her treatment will consist of a combination of mobility and stability exercises as well as retraining movement patterns to optimize function and decrease pain.    Pt prognosis is Good.   Pt will benefit from skilled outpatient Physical Therapy to address the deficits stated above and in the chart below, provide pt/family education, and to maximize pt's level of independence.     Plan of care discussed with patient: Yes  Pt's spiritual, cultural and educational needs considered and patient is agreeable to the plan of care and goals as stated below:       Anticipated Barriers for therapy:  none    Medical Necessity is demonstrated by the following    History  Co-morbidities and personal factors that may impact the plan of care Co-morbidities:   pregnancy    Personal Factors:   no deficits     low   Examination  Body Structures and Functions, activity limitations and participation restrictions that may impact the plan of care Body Regions/Systems/Functions:  altered posture, poor knowledge of body mechanics and posture, poor trunk stability, increased nocturia and muscular imbalances and weakness.  Activity Limitations:  Pain with ADLs    Participation Restrictions:  exercise restrictions due to pain     Activity limitations:   Learning and applying knowledge  no deficits    General Tasks and Commands  no deficits    Communication  no deficits    Mobility  no deficits    Self care  no deficits    Domestic Life  no deficits    Interactions/Relationships  no deficits    Life Areas  no deficits    Community and Social Life  no deficits       moderate   Clinical Presentation stable and uncomplicated low   Decision Making/ Complexity Score: low       Short Term Goals: 4 weeks  Pt will report improved ability to engage pelvic/abdominal brace with < or = 2/10 pain for improved tolerance of functional transfers/mobility.   Pt to demonstrate proper diaphragmatic breathing to help with calming the nervous system in order to decrease pain and to improve abdominal wall musculature extensibility.   Pt to report being able to complete a half day at work without significant increase in pain to demonstrate a return towards prior level of function.  Pt to report a decrease in pain to no more than 6/10 at it's worst with walking.       Long Term Goals: 12 weeks   Pt to be discharged with home plan for carry over after discharge.   Pt will be trained and compliant with postural strategies in sitting and standing to improve alignment and decrease pain and muscle fatigue  Pt to report being able to complete a full day at  work without significant increase in pain to demonstrate a return towards prior level of function.  Pt to report improved restorative sleeping, waking up no more than 1x/night from pain or urinary urges.  Pt to report a decrease in pain to no more than 3/10 at it's worst with walking.        Plan     Plan of care Certification: 3/25/2022 to 7/15/22.    Outpatient Physical Therapy 1 times weekly for 12 weeks to include the following interventions: therapeutic exercises, therapeutic activity, neuromuscular re-education, manual therapy, modalities PRN, patient/family education, dry needling, and self care/home management    I appreciate your consult and look forward to participating in this patient's care.    Piedad Brandt, PT, DPT

## 2022-04-04 ENCOUNTER — PATIENT MESSAGE (OUTPATIENT)
Dept: OBSTETRICS AND GYNECOLOGY | Facility: CLINIC | Age: 27
End: 2022-04-04
Payer: COMMERCIAL

## 2022-04-18 ENCOUNTER — CLINICAL SUPPORT (OUTPATIENT)
Dept: REHABILITATION | Facility: HOSPITAL | Age: 27
End: 2022-04-18
Payer: COMMERCIAL

## 2022-04-18 DIAGNOSIS — M79.10 MYALGIA: ICD-10-CM

## 2022-04-18 DIAGNOSIS — M53.3 SI (SACROILIAC) PAIN: ICD-10-CM

## 2022-04-18 DIAGNOSIS — R53.1 WEAKNESS: ICD-10-CM

## 2022-04-18 PROCEDURE — 97530 THERAPEUTIC ACTIVITIES: CPT | Mod: PO

## 2022-04-18 PROCEDURE — 97140 MANUAL THERAPY 1/> REGIONS: CPT | Mod: PO

## 2022-04-18 NOTE — PROGRESS NOTES
"  Pelvic Health Physical Therapy   Treatment Note     Name: Peyton Cox Qiu  Clinic Number: [id number]    Therapy Diagnosis:   Encounter Diagnoses   Name Primary?    SI (sacroiliac) pain     Weakness     Myalgia      Physician: Maki Gomes MD    Visit Date: 4/18/2022    Physician Orders: Pelvic PT Eval and Treat  Medical Diagnosis from Referral: SI (sacroiliac) pain [M53.3]  Evaluation Date: 3/25/2022  Authorization Period Expiration: 12/31/22  Plan of Care Expiration: 07/15/22  Visit # / Visits authorized: 1/16  Cancelled Visits: 0  No Show Visits: 0    Time In: 12:30  Time Out: 1:35  Total Billable Time: 65 minutes    Precautions: Standard and pregnant    Subjective     Pt reports: Her back pain has completely gone away! But, now her R hip feels like "there's an air pocket in there and it won't pop". Pain is "irritating."  She was compliant with home exercise program.  Response to previous treatment: less LBP  Functional change: ongoing    Pain: 6/10  Location: R posterolateral hip    Constitutional Symptoms Review: The patient denies having any constitutional symptoms.     Objective     Peyton received the following manual therapy techniques: to develop extensibility and desensitization for 55 minutes including: trigger point/myofascial release of R glute med with manual pressure and TheraGun, lateral and inferior hip distraction grade 2-3 with belt, mobilization with movement into flexion.    Peyton participated in dynamic functional therapeutic activities to improve functional performance for 10  minutes, including:  Instructed pt in advancing HEP to  clamshells, continuing with stretching and tennis ball release. Seated Cat/cow x 30 reps. Attempted griffin stretch but stopped due to increased pain    Home Exercises Provided and Patient Education Provided     Education provided:   - cont prior HEP  Discussed progression of plan of care with patient; educated pt in activity modification; " reviewed HEP with pt. Pt demonstrated and verbalized understanding of all instruction and was not provided with a handout of HEP (see Patient Instructions).    Written Home Exercises Provided: Patient instructed to cont prior HEP.  Exercises were reviewed and Peyton was able to demonstrate them prior to the end of the session.  Peyton demonstrated good  understanding of the education provided.     See EMR under Patient Instructions for exercises provided prior visit.    Assessment     Peyton tolerated treatment well and demonstrated understanding of all education provided at today's visit. Her hip pain was limiting today, as any amount of movement seemed to irritate it. Gentle manual therapy and stretching provided to assist in pain relief and muscle spasm reduction.    Peyton Is progressing well towards her goals.   Pt prognosis is Good.     Pt will continue to benefit from skilled outpatient physical therapy to address the deficits listed in the problem list box on initial evaluation, provide pt/family education and to maximize pt's level of independence in the home and community environment.     Pt's spiritual, cultural and educational needs considered and pt agreeable to plan of care and goals.     Anticipated barriers to physical therapy: none    Short Term Goals: 4 weeks  Pt will report improved ability to engage pelvic/abdominal brace with < or = 2/10 pain for improved tolerance of functional transfers/mobility.   Pt to demonstrate proper diaphragmatic breathing to help with calming the nervous system in order to decrease pain and to improve abdominal wall musculature extensibility.   Pt to report being able to complete a half day at work without significant increase in pain to demonstrate a return towards prior level of function.  Pt to report a decrease in pain to no more than 6/10 at it's worst with walking.       Long Term Goals: 12 weeks   Pt to be discharged with home plan for carry over after  discharge.   Pt will be trained and compliant with postural strategies in sitting and standing to improve alignment and decrease pain and muscle fatigue  Pt to report being able to complete a full day at work without significant increase in pain to demonstrate a return towards prior level of function.  Pt to report improved restorative sleeping, waking up no more than 1x/night from pain or urinary urges.  Pt to report a decrease in pain to no more than 3/10 at it's worst with walking.      Plan     Continue per established POC.     Piedad Brandt, PT , DPT

## 2022-04-20 ENCOUNTER — PROCEDURE VISIT (OUTPATIENT)
Dept: OBSTETRICS AND GYNECOLOGY | Facility: CLINIC | Age: 27
End: 2022-04-20
Payer: COMMERCIAL

## 2022-04-20 ENCOUNTER — ROUTINE PRENATAL (OUTPATIENT)
Dept: OBSTETRICS AND GYNECOLOGY | Facility: CLINIC | Age: 27
End: 2022-04-20
Payer: COMMERCIAL

## 2022-04-20 VITALS
BODY MASS INDEX: 51.45 KG/M2 | WEIGHT: 290.44 LBS | DIASTOLIC BLOOD PRESSURE: 86 MMHG | SYSTOLIC BLOOD PRESSURE: 124 MMHG

## 2022-04-20 DIAGNOSIS — O99.212 OBESITY AFFECTING PREGNANCY IN SECOND TRIMESTER: ICD-10-CM

## 2022-04-20 DIAGNOSIS — E28.2 PCOS (POLYCYSTIC OVARIAN SYNDROME): ICD-10-CM

## 2022-04-20 DIAGNOSIS — Z3A.23 23 WEEKS GESTATION OF PREGNANCY: Primary | ICD-10-CM

## 2022-04-20 DIAGNOSIS — D25.9 UTERINE LEIOMYOMA, UNSPECIFIED LOCATION: ICD-10-CM

## 2022-04-20 DIAGNOSIS — Z36.2 ENCOUNTER FOR FOLLOW-UP ULTRASOUND OF FETAL ANATOMY: ICD-10-CM

## 2022-04-20 PROCEDURE — 99999 PR PBB SHADOW E&M-EST. PATIENT-LVL II: ICD-10-PCS | Mod: PBBFAC,,, | Performed by: OBSTETRICS & GYNECOLOGY

## 2022-04-20 PROCEDURE — 76816 US OB/GYN PROCEDURE (VIEWPOINT): ICD-10-PCS | Mod: S$GLB,,, | Performed by: OBSTETRICS & GYNECOLOGY

## 2022-04-20 PROCEDURE — 99999 PR PBB SHADOW E&M-EST. PATIENT-LVL II: CPT | Mod: PBBFAC,,, | Performed by: OBSTETRICS & GYNECOLOGY

## 2022-04-20 PROCEDURE — 76816 OB US FOLLOW-UP PER FETUS: CPT | Mod: S$GLB,,, | Performed by: OBSTETRICS & GYNECOLOGY

## 2022-04-20 PROCEDURE — 0502F PR SUBSEQUENT PRENATAL CARE: ICD-10-PCS | Mod: CPTII,S$GLB,, | Performed by: OBSTETRICS & GYNECOLOGY

## 2022-04-20 PROCEDURE — 0502F SUBSEQUENT PRENATAL CARE: CPT | Mod: CPTII,S$GLB,, | Performed by: OBSTETRICS & GYNECOLOGY

## 2022-04-20 NOTE — PROGRESS NOTES
No c/o today   FHT: on US   @  23 2 wga  1. iob reviewed; enrolled in connected mom[employer]; declines covid and flu vaccine; MT 21 negative; anatomy incomplete so repeat done today, still limited, will repeat at 32 wga; GTT ordered today      2. Anemia: on iron   3. Obesity: did targeted US.  Early GTT normal   -Will start  testing since BMI >45. At 34 weels   - on ASA  -discussed tdap, will think about it

## 2022-04-21 ENCOUNTER — PATIENT MESSAGE (OUTPATIENT)
Dept: OBSTETRICS AND GYNECOLOGY | Facility: HOSPITAL | Age: 27
End: 2022-04-21
Payer: COMMERCIAL

## 2022-04-24 ENCOUNTER — PATIENT MESSAGE (OUTPATIENT)
Dept: OBSTETRICS AND GYNECOLOGY | Facility: CLINIC | Age: 27
End: 2022-04-24
Payer: COMMERCIAL

## 2022-04-24 DIAGNOSIS — Z36.2 ENCOUNTER FOR FOLLOW-UP ULTRASOUND OF FETAL ANATOMY: Primary | ICD-10-CM

## 2022-04-25 ENCOUNTER — CLINICAL SUPPORT (OUTPATIENT)
Dept: REHABILITATION | Facility: HOSPITAL | Age: 27
End: 2022-04-25
Payer: COMMERCIAL

## 2022-04-25 DIAGNOSIS — M53.3 SI (SACROILIAC) PAIN: Primary | ICD-10-CM

## 2022-04-25 DIAGNOSIS — R53.1 WEAKNESS: ICD-10-CM

## 2022-04-25 DIAGNOSIS — M79.10 MYALGIA: ICD-10-CM

## 2022-04-25 PROCEDURE — 97140 MANUAL THERAPY 1/> REGIONS: CPT | Mod: PO

## 2022-04-25 PROCEDURE — 97110 THERAPEUTIC EXERCISES: CPT | Mod: PO

## 2022-04-25 NOTE — PROGRESS NOTES
"  Pelvic Health Physical Therapy   Treatment/Progress Note     Name: Peyton Qiu  Clinic Number: [id number]    Therapy Diagnosis:   Encounter Diagnoses   Name Primary?    SI (sacroiliac) pain Yes    Weakness     Myalgia      Physician: Maki Gomes MD    Visit Date: 4/25/2022    Physician Orders: Pelvic PT Eval and Treat  Medical Diagnosis from Referral: SI (sacroiliac) pain [M53.3]  Evaluation Date: 3/25/2022  Authorization Period Expiration: 12/31/22  Plan of Care Expiration: 07/15/22  Visit # / Visits authorized: 2/16  Cancelled Visits: 0  No Show Visits: 0    Time In: 9:30  Time Out: 10:20  Total Billable Time: 50 minutes    Precautions: Standard and pregnant    Subjective     Pt reports: Hip pain still feels "poppy". She did feel better after last visit, though unable to do SL clamshells.    She was compliant with home exercise program.  Response to previous treatment: less LBP  Functional change: ongoing    Pain: 5/10  Location: R posterolateral hip    Constitutional Symptoms Review: The patient denies having any constitutional symptoms.     Objective     Peyton received the following manual therapy techniques: to develop extensibility and desensitization for 40 minutes including: trigger point/myofascial release of R glute med with manual pressure and TheraGun, lateral and inferior hip distraction grade 2-3 with belt, mobilization with movement into flexion.    Peyton received therapeutic exercises to develop  ROM for 10 minutes including: standing hip articulations (reversed due to pain with inital hip flexion) x 20, hip flexor stretch standing lunge 2 x 1 minute    Home Exercises Provided and Patient Education Provided     Education provided:   - cont prior HEP  Discussed progression of plan of care with patient; educated pt in activity modification; reviewed HEP with pt. Pt demonstrated and verbalized understanding of all instruction and was not provided with a handout of HEP (see " Patient Instructions).    Written Home Exercises Provided: Patient instructed to cont prior HEP.  Exercises were reviewed and Peyton was able to demonstrate them prior to the end of the session.  Peyton demonstrated good  understanding of the education provided.     See EMR under Patient Instructions for exercises provided prior visit.    Assessment     Peyton tolerated treatment well and demonstrated understanding of all education provided at today's visit. Her hip pain continues today, though it seems less limiting than at the last session. Gentle manual therapy and stretching provided to assist in pain relief and muscle spasm reduction. Goals assessed today.    Peyton Is progressing well towards her goals.   Pt prognosis is Good.     Pt will continue to benefit from skilled outpatient physical therapy to address the deficits listed in the problem list box on initial evaluation, provide pt/family education and to maximize pt's level of independence in the home and community environment.     Pt's spiritual, cultural and educational needs considered and pt agreeable to plan of care and goals.     Anticipated barriers to physical therapy: none    Short Term Goals: 4 weeks  Pt will report improved ability to engage pelvic/abdominal brace with < or = 2/10 pain for improved tolerance of functional transfers/mobility.  PROGRESSING 4/25  Pt to demonstrate proper diaphragmatic breathing to help with calming the nervous system in order to decrease pain and to improve abdominal wall musculature extensibility. PROGRESSING 4/25  Pt to report being able to complete a half day at work without significant increase in pain to demonstrate a return towards prior level of function. PROGRESSING 4/25  Pt to report a decrease in pain to no more than 6/10 at it's worst with walking.  PROGRESSING 4/25     Long Term Goals: 12 weeks   Pt to be discharged with home plan for carry over after discharge.   Pt will be trained and compliant with  postural strategies in sitting and standing to improve alignment and decrease pain and muscle fatigue  Pt to report being able to complete a full day at work without significant increase in pain to demonstrate a return towards prior level of function.  Pt to report improved restorative sleeping, waking up no more than 1x/night from pain or urinary urges.  Pt to report a decrease in pain to no more than 3/10 at it's worst with walking.      Plan     Continue per established POC.     Piedad Brandt, PT , DPT

## 2022-04-26 ENCOUNTER — PATIENT MESSAGE (OUTPATIENT)
Dept: OBSTETRICS AND GYNECOLOGY | Facility: CLINIC | Age: 27
End: 2022-04-26
Payer: COMMERCIAL

## 2022-04-27 ENCOUNTER — TELEPHONE (OUTPATIENT)
Dept: MATERNAL FETAL MEDICINE | Facility: CLINIC | Age: 27
End: 2022-04-27
Payer: COMMERCIAL

## 2022-04-27 NOTE — TELEPHONE ENCOUNTER
Message left for pt to call [facility] clinic at [phone] to schedule consult and ultrasound.

## 2022-04-29 ENCOUNTER — PATIENT MESSAGE (OUTPATIENT)
Dept: OBSTETRICS AND GYNECOLOGY | Facility: CLINIC | Age: 27
End: 2022-04-29
Payer: COMMERCIAL

## 2022-04-29 ENCOUNTER — LAB VISIT (OUTPATIENT)
Dept: LAB | Facility: HOSPITAL | Age: 27
End: 2022-04-29
Attending: OBSTETRICS & GYNECOLOGY
Payer: COMMERCIAL

## 2022-04-29 DIAGNOSIS — Z3A.23 23 WEEKS GESTATION OF PREGNANCY: ICD-10-CM

## 2022-04-29 LAB — GLUCOSE SERPL-MCNC: 133 MG/DL (ref 70–140)

## 2022-04-29 PROCEDURE — 82950 GLUCOSE TEST: CPT | Performed by: OBSTETRICS & GYNECOLOGY

## 2022-04-29 PROCEDURE — 36415 COLL VENOUS BLD VENIPUNCTURE: CPT | Performed by: OBSTETRICS & GYNECOLOGY

## 2022-05-02 ENCOUNTER — PATIENT MESSAGE (OUTPATIENT)
Dept: ADMINISTRATIVE | Facility: OTHER | Age: 27
End: 2022-05-02
Payer: COMMERCIAL

## 2022-05-03 ENCOUNTER — CLINICAL SUPPORT (OUTPATIENT)
Dept: REHABILITATION | Facility: HOSPITAL | Age: 27
End: 2022-05-03
Payer: COMMERCIAL

## 2022-05-03 DIAGNOSIS — M79.10 MYALGIA: ICD-10-CM

## 2022-05-03 DIAGNOSIS — M53.3 SI (SACROILIAC) PAIN: ICD-10-CM

## 2022-05-03 DIAGNOSIS — R53.1 WEAKNESS: ICD-10-CM

## 2022-05-03 PROCEDURE — 97140 MANUAL THERAPY 1/> REGIONS: CPT | Mod: PO

## 2022-05-03 PROCEDURE — 97110 THERAPEUTIC EXERCISES: CPT | Mod: PO

## 2022-05-03 NOTE — PROGRESS NOTES
Pelvic Health Physical Therapy   Treatment Note     Name: Peyton Cox Qiu  [facility] Number: [id number]    Therapy Diagnosis:   Encounter Diagnoses   Name Primary?    SI (sacroiliac) pain     Weakness     Myalgia      Physician: Maki Gomes MD    Visit Date: 5/3/2022    Physician Orders: Pelvic PT Eval and Treat  Medical Diagnosis from Referral: SI (sacroiliac) pain [M53.3]  Evaluation Date: 3/25/2022  Authorization Period Expiration: 12/31/22  Plan of Care Expiration: 07/15/22  Visit # / Visits authorized: 3/16  Cancelled Visits: 0  No Show Visits: 0    Time In: 12:05  Time Out: 1:00  Total Billable Time: 55 minutes    Precautions: Standard and pregnant    Subjective     Pt reports: She feels better today - feels mild soreness in the R hip still bothersome but better. 25 weeks pregnant now.  She was compliant with home exercise program.  Response to previous treatment: less LBP, less hip pain  Functional change: ongoing    Pain: 2/10  Location: R posterolateral hip    Constitutional Symptoms Review: The patient denies having any constitutional symptoms.     Objective     Peyton received the following manual therapy techniques: to develop extensibility and desensitization for 40 minutes including: trigger point/myofascial release of R glute med with manual pressure and TheraGun, lateral and inferior hip distraction grade 2-3 with belt, mobilization with movement into flexion.    Peyton received therapeutic exercises to develop strength and functional movement for 15 minutes including: functional bracing and training for sit<>stand (hip-head relationship, nose over toes)    Home Exercises Provided and Patient Education Provided     Education provided:   - cont prior HEP  Discussed progression of plan of care with patient; educated pt in activity modification; reviewed HEP with pt. Pt demonstrated and verbalized understanding of all instruction and was not provided with a handout of HEP (see Patient  Instructions).    Written Home Exercises Provided: Patient instructed to cont prior HEP.  Exercises were reviewed and Peyton was able to demonstrate them prior to the end of the session.  Peyton demonstrated good  understanding of the education provided.     See EMR under Patient Instructions for exercises provided prior visit.    Assessment     Peyton tolerated treatment well and demonstrated understanding of all education provided at today's visit. Her hip pain continues today, though it seems less limiting than at the last session. Gentle manual therapy and stretching provided to assist in pain relief and muscle spasm reduction.     Peyton Is progressing well towards her goals.   Pt prognosis is Good.     Pt will continue to benefit from skilled outpatient physical therapy to address the deficits listed in the problem list box on initial evaluation, provide pt/family education and to maximize pt's level of independence in the home and community environment.     Pt's spiritual, cultural and educational needs considered and pt agreeable to plan of care and goals.     Anticipated barriers to physical therapy: none    Short Term Goals: 4 weeks  Pt will report improved ability to engage pelvic/abdominal brace with < or = 2/10 pain for improved tolerance of functional transfers/mobility.  PROGRESSING 4/25  Pt to demonstrate proper diaphragmatic breathing to help with calming the nervous system in order to decrease pain and to improve abdominal wall musculature extensibility. PROGRESSING 4/25  Pt to report being able to complete a half day at work without significant increase in pain to demonstrate a return towards prior level of function. PROGRESSING 4/25  Pt to report a decrease in pain to no more than 6/10 at it's worst with walking.  PROGRESSING 4/25     Long Term Goals: 12 weeks   Pt to be discharged with home plan for carry over after discharge.   Pt will be trained and compliant with postural strategies in  sitting and standing to improve alignment and decrease pain and muscle fatigue  Pt to report being able to complete a full day at work without significant increase in pain to demonstrate a return towards prior level of function.  Pt to report improved restorative sleeping, waking up no more than 1x/night from pain or urinary urges.  Pt to report a decrease in pain to no more than 3/10 at it's worst with walking.      Plan     Continue per established POC.     Piedad Brandt, PT , DPT

## 2022-05-04 ENCOUNTER — PATIENT MESSAGE (OUTPATIENT)
Dept: OBSTETRICS AND GYNECOLOGY | Facility: CLINIC | Age: 27
End: 2022-05-04
Payer: COMMERCIAL

## 2022-05-04 ENCOUNTER — TELEPHONE (OUTPATIENT)
Dept: OBSTETRICS AND GYNECOLOGY | Facility: CLINIC | Age: 27
End: 2022-05-04
Payer: COMMERCIAL

## 2022-05-04 NOTE — TELEPHONE ENCOUNTER
Called pt to r/s appointment on 05/23 due to Dr. Gomes being in surgery. No answer. [facility]

## 2022-05-12 ENCOUNTER — PROCEDURE VISIT (OUTPATIENT)
Dept: MATERNAL FETAL MEDICINE | Facility: HOSPITAL | Age: 27
End: 2022-05-12
Payer: COMMERCIAL

## 2022-05-12 VITALS
SYSTOLIC BLOOD PRESSURE: 118 MMHG | DIASTOLIC BLOOD PRESSURE: 70 MMHG | BODY MASS INDEX: 51.91 KG/M2 | HEIGHT: 63 IN | WEIGHT: 293 LBS

## 2022-05-12 DIAGNOSIS — E66.01 MATERNAL MORBID OBESITY, ANTEPARTUM: ICD-10-CM

## 2022-05-12 DIAGNOSIS — Z3A.26 26 WEEKS GESTATION OF PREGNANCY: ICD-10-CM

## 2022-05-12 DIAGNOSIS — Z36.4 ANTENATAL SCREENING FOR FETAL GROWTH RETARDATION USING ULTRASONICS: ICD-10-CM

## 2022-05-12 DIAGNOSIS — O99.210 MATERNAL MORBID OBESITY, ANTEPARTUM: ICD-10-CM

## 2022-05-12 DIAGNOSIS — Z36.2 ENCOUNTER FOR FOLLOW-UP ULTRASOUND OF FETAL ANATOMY: ICD-10-CM

## 2022-05-12 PROCEDURE — 76816 OB US FOLLOW-UP PER FETUS: CPT | Performed by: OBSTETRICS & GYNECOLOGY

## 2022-05-12 PROCEDURE — 99213 PR OFFICE/OUTPT VISIT, EST, LEVL III, 20-29 MIN: ICD-10-PCS | Mod: 25,,, | Performed by: OBSTETRICS & GYNECOLOGY

## 2022-05-12 PROCEDURE — 99213 OFFICE O/P EST LOW 20 MIN: CPT | Mod: 25,,, | Performed by: OBSTETRICS & GYNECOLOGY

## 2022-05-12 PROCEDURE — 76816 OB US FOLLOW-UP PER FETUS: CPT | Mod: 26,,, | Performed by: OBSTETRICS & GYNECOLOGY

## 2022-05-12 PROCEDURE — 76816 PR  US,PREGNANT UTERUS,F/U,TRANSABD APP: ICD-10-PCS | Mod: 26,,, | Performed by: OBSTETRICS & GYNECOLOGY

## 2022-05-15 ENCOUNTER — PATIENT MESSAGE (OUTPATIENT)
Dept: OBSTETRICS AND GYNECOLOGY | Facility: CLINIC | Age: 27
End: 2022-05-15
Payer: COMMERCIAL

## 2022-05-16 NOTE — PROGRESS NOTES
See ultrasound report for details of ultrasound evaluation, counseling,  and recommendations.

## 2022-05-23 ENCOUNTER — ROUTINE PRENATAL (OUTPATIENT)
Dept: OBSTETRICS AND GYNECOLOGY | Facility: CLINIC | Age: 27
End: 2022-05-23
Payer: COMMERCIAL

## 2022-05-23 ENCOUNTER — CLINICAL SUPPORT (OUTPATIENT)
Dept: OBSTETRICS AND GYNECOLOGY | Facility: CLINIC | Age: 27
End: 2022-05-23
Payer: COMMERCIAL

## 2022-05-23 VITALS — DIASTOLIC BLOOD PRESSURE: 76 MMHG | WEIGHT: 293 LBS | SYSTOLIC BLOOD PRESSURE: 124 MMHG | BODY MASS INDEX: 52.7 KG/M2

## 2022-05-23 DIAGNOSIS — E66.9 OBESITY, UNSPECIFIED CLASSIFICATION, UNSPECIFIED OBESITY TYPE, UNSPECIFIED WHETHER SERIOUS COMORBIDITY PRESENT: ICD-10-CM

## 2022-05-23 DIAGNOSIS — Z3A.28 28 WEEKS GESTATION OF PREGNANCY: Primary | ICD-10-CM

## 2022-05-23 DIAGNOSIS — Z23 NEED FOR TETANUS, DIPHTHERIA, AND ACELLULAR PERTUSSIS (TDAP) VACCINE: Primary | ICD-10-CM

## 2022-05-23 PROCEDURE — 90471 IMMUNIZATION ADMIN: CPT | Mod: S$GLB,,, | Performed by: OBSTETRICS & GYNECOLOGY

## 2022-05-23 PROCEDURE — 0502F SUBSEQUENT PRENATAL CARE: CPT | Mod: CPTII,S$GLB,, | Performed by: OBSTETRICS & GYNECOLOGY

## 2022-05-23 PROCEDURE — 99999 PR PBB SHADOW E&M-EST. PATIENT-LVL II: ICD-10-PCS | Mod: PBBFAC,,, | Performed by: OBSTETRICS & GYNECOLOGY

## 2022-05-23 PROCEDURE — 99999 PR PBB SHADOW E&M-EST. PATIENT-LVL II: CPT | Mod: PBBFAC,,, | Performed by: OBSTETRICS & GYNECOLOGY

## 2022-05-23 PROCEDURE — 99999 PR PBB SHADOW E&M-EST. PATIENT-LVL I: ICD-10-PCS | Mod: PBBFAC,,,

## 2022-05-23 PROCEDURE — 99999 PR PBB SHADOW E&M-EST. PATIENT-LVL I: CPT | Mod: PBBFAC,,,

## 2022-05-23 PROCEDURE — 90715 TDAP VACCINE GREATER THAN OR EQUAL TO 7YO IM: ICD-10-PCS | Mod: S$GLB,,, | Performed by: OBSTETRICS & GYNECOLOGY

## 2022-05-23 PROCEDURE — 0502F PR SUBSEQUENT PRENATAL CARE: ICD-10-PCS | Mod: CPTII,S$GLB,, | Performed by: OBSTETRICS & GYNECOLOGY

## 2022-05-23 PROCEDURE — 90471 TDAP VACCINE GREATER THAN OR EQUAL TO 7YO IM: ICD-10-PCS | Mod: S$GLB,,, | Performed by: OBSTETRICS & GYNECOLOGY

## 2022-05-23 PROCEDURE — 90715 TDAP VACCINE 7 YRS/> IM: CPT | Mod: S$GLB,,, | Performed by: OBSTETRICS & GYNECOLOGY

## 2022-05-24 ENCOUNTER — LAB VISIT (OUTPATIENT)
Dept: LAB | Facility: HOSPITAL | Age: 27
End: 2022-05-24
Attending: OBSTETRICS & GYNECOLOGY
Payer: COMMERCIAL

## 2022-05-24 DIAGNOSIS — Z3A.28 28 WEEKS GESTATION OF PREGNANCY: ICD-10-CM

## 2022-05-24 LAB
BASOPHILS # BLD AUTO: 0.02 K/UL (ref 0–0.2)
BASOPHILS NFR BLD: 0.2 % (ref 0–1.9)
DIFFERENTIAL METHOD: ABNORMAL
EOSINOPHIL # BLD AUTO: 0.1 K/UL (ref 0–0.5)
EOSINOPHIL NFR BLD: 1 % (ref 0–8)
ERYTHROCYTE [DISTWIDTH] IN BLOOD BY AUTOMATED COUNT: 19.6 % (ref 11.5–14.5)
HCT VFR BLD AUTO: 29.8 % (ref 37–48.5)
HGB BLD-MCNC: 8.7 G/DL (ref 12–16)
IMM GRANULOCYTES # BLD AUTO: 0.16 K/UL (ref 0–0.04)
IMM GRANULOCYTES NFR BLD AUTO: 1.6 % (ref 0–0.5)
LYMPHOCYTES # BLD AUTO: 1.8 K/UL (ref 1–4.8)
LYMPHOCYTES NFR BLD: 17.7 % (ref 18–48)
MCH RBC QN AUTO: 22.4 PG (ref 27–31)
MCHC RBC AUTO-ENTMCNC: 29.2 G/DL (ref 32–36)
MCV RBC AUTO: 77 FL (ref 82–98)
MONOCYTES # BLD AUTO: 0.6 K/UL (ref 0.3–1)
MONOCYTES NFR BLD: 6.3 % (ref 4–15)
NEUTROPHILS # BLD AUTO: 7.3 K/UL (ref 1.8–7.7)
NEUTROPHILS NFR BLD: 73.2 % (ref 38–73)
NRBC BLD-RTO: 0 /100 WBC
PLATELET # BLD AUTO: 410 K/UL (ref 150–450)
PMV BLD AUTO: 8.5 FL (ref 9.2–12.9)
RBC # BLD AUTO: 3.89 M/UL (ref 4–5.4)
WBC # BLD AUTO: 10.02 K/UL (ref 3.9–12.7)

## 2022-05-24 PROCEDURE — 85025 COMPLETE CBC W/AUTO DIFF WBC: CPT | Performed by: OBSTETRICS & GYNECOLOGY

## 2022-05-24 PROCEDURE — 87389 HIV-1 AG W/HIV-1&-2 AB AG IA: CPT | Performed by: OBSTETRICS & GYNECOLOGY

## 2022-05-24 PROCEDURE — 36415 COLL VENOUS BLD VENIPUNCTURE: CPT | Performed by: OBSTETRICS & GYNECOLOGY

## 2022-05-24 PROCEDURE — 86592 SYPHILIS TEST NON-TREP QUAL: CPT | Performed by: OBSTETRICS & GYNECOLOGY

## 2022-05-25 ENCOUNTER — PATIENT MESSAGE (OUTPATIENT)
Dept: OBSTETRICS AND GYNECOLOGY | Facility: CLINIC | Age: 27
End: 2022-05-25
Payer: COMMERCIAL

## 2022-05-25 DIAGNOSIS — D50.9 IRON DEFICIENCY ANEMIA, UNSPECIFIED IRON DEFICIENCY ANEMIA TYPE: Primary | ICD-10-CM

## 2022-05-25 LAB
HIV 1+2 AB+HIV1 P24 AG SERPL QL IA: NEGATIVE
RPR SER QL: NORMAL

## 2022-05-25 RX ORDER — FERROUS SULFATE 325(65) MG
325 TABLET, DELAYED RELEASE (ENTERIC COATED) ORAL EVERY OTHER DAY
Qty: 60 TABLET | Refills: 3 | Status: SHIPPED | OUTPATIENT
Start: 2022-05-25

## 2022-06-01 ENCOUNTER — PATIENT MESSAGE (OUTPATIENT)
Dept: OBSTETRICS AND GYNECOLOGY | Facility: CLINIC | Age: 27
End: 2022-06-01
Payer: COMMERCIAL

## 2022-06-07 ENCOUNTER — PATIENT MESSAGE (OUTPATIENT)
Dept: OBSTETRICS AND GYNECOLOGY | Facility: CLINIC | Age: 27
End: 2022-06-07
Payer: COMMERCIAL

## 2022-06-07 NOTE — TELEPHONE ENCOUNTER
Pt msg: I was trying to see if there was a way that I can get a note stating that I need to wear elastic band pants or something loose fitting around my stomach or even stretching pants. My job is giving me an issue and want to me to wear slacks with no elastic and are button up. Its painful to wear especially when sitting because the band then pushes into my stomach. When I where anything to tight or pushing into my stomach it causes pain. They told me they understand that Im pregnant but I would need a note from my doctor saying I can wear another material instead or maternity material pants.    lmk if this is okay and I can type up letter

## 2022-06-09 ENCOUNTER — ROUTINE PRENATAL (OUTPATIENT)
Dept: OBSTETRICS AND GYNECOLOGY | Facility: CLINIC | Age: 27
End: 2022-06-09
Payer: COMMERCIAL

## 2022-06-09 VITALS — WEIGHT: 293 LBS | DIASTOLIC BLOOD PRESSURE: 76 MMHG | SYSTOLIC BLOOD PRESSURE: 114 MMHG | BODY MASS INDEX: 52.92 KG/M2

## 2022-06-09 DIAGNOSIS — Z3A.30 30 WEEKS GESTATION OF PREGNANCY: Primary | ICD-10-CM

## 2022-06-09 DIAGNOSIS — E28.2 PCOS (POLYCYSTIC OVARIAN SYNDROME): ICD-10-CM

## 2022-06-09 DIAGNOSIS — O99.212 OBESITY AFFECTING PREGNANCY IN SECOND TRIMESTER: ICD-10-CM

## 2022-06-09 PROCEDURE — 99999 PR PBB SHADOW E&M-EST. PATIENT-LVL II: ICD-10-PCS | Mod: PBBFAC,,, | Performed by: OBSTETRICS & GYNECOLOGY

## 2022-06-09 PROCEDURE — 0502F SUBSEQUENT PRENATAL CARE: CPT | Mod: CPTII,S$GLB,, | Performed by: OBSTETRICS & GYNECOLOGY

## 2022-06-09 PROCEDURE — 99999 PR PBB SHADOW E&M-EST. PATIENT-LVL II: CPT | Mod: PBBFAC,,, | Performed by: OBSTETRICS & GYNECOLOGY

## 2022-06-09 PROCEDURE — 0502F PR SUBSEQUENT PRENATAL CARE: ICD-10-PCS | Mod: CPTII,S$GLB,, | Performed by: OBSTETRICS & GYNECOLOGY

## 2022-06-09 NOTE — PROGRESS NOTES
No c/o today   FHT normal   @  30 4/7 wga  1. iob reviewed; enrolled in connected moms; declines covid and flu vaccine; MT 21 negative; anatomy incomplete so repeat done today, still limited, will repeat at 32 wga; DM test normal; tdap done; 3rd trim labs done    2. Anemia: on iron   3. Obesity: did targeted US.  Early GTT normal; 32 week US set up   -Will start  testing since BMI >45. At 34 weeks PP   - on ASA    -breast feeding, discussed how to get pump and latch and holds  -peds: darvin marcus   - wm birth plan and epidural next visit

## 2022-06-16 ENCOUNTER — PATIENT MESSAGE (OUTPATIENT)
Dept: OBSTETRICS AND GYNECOLOGY | Facility: CLINIC | Age: 27
End: 2022-06-16
Payer: COMMERCIAL

## 2022-06-16 ENCOUNTER — PROCEDURE VISIT (OUTPATIENT)
Dept: MATERNAL FETAL MEDICINE | Facility: HOSPITAL | Age: 27
End: 2022-06-16
Payer: COMMERCIAL

## 2022-06-16 DIAGNOSIS — E66.01 MATERNAL MORBID OBESITY, ANTEPARTUM: ICD-10-CM

## 2022-06-16 DIAGNOSIS — O99.210 MATERNAL MORBID OBESITY, ANTEPARTUM: ICD-10-CM

## 2022-06-16 DIAGNOSIS — Z3A.31 31 WEEKS GESTATION OF PREGNANCY: ICD-10-CM

## 2022-06-16 DIAGNOSIS — Z36.2 ENCOUNTER FOR FOLLOW-UP ULTRASOUND OF FETAL ANATOMY: ICD-10-CM

## 2022-06-16 DIAGNOSIS — Z36.4 ANTENATAL SCREENING FOR FETAL GROWTH RETARDATION USING ULTRASONICS: ICD-10-CM

## 2022-06-16 PROCEDURE — 76816 OB US FOLLOW-UP PER FETUS: CPT | Mod: 26,,, | Performed by: OBSTETRICS & GYNECOLOGY

## 2022-06-16 PROCEDURE — 76816 OB US FOLLOW-UP PER FETUS: CPT | Performed by: OBSTETRICS & GYNECOLOGY

## 2022-06-16 PROCEDURE — 76816 PR  US,PREGNANT UTERUS,F/U,TRANSABD APP: ICD-10-PCS | Mod: 26,,, | Performed by: OBSTETRICS & GYNECOLOGY

## 2022-06-20 ENCOUNTER — PATIENT MESSAGE (OUTPATIENT)
Dept: ADMINISTRATIVE | Facility: OTHER | Age: 27
End: 2022-06-20
Payer: COMMERCIAL

## 2022-06-21 NOTE — PROGRESS NOTES
PATIENT: Peyton Qiu  MRN: [id number]  DATE: 6/22/2022    Diagnosis:   1. Iron deficiency anemia during pregnancy    2. 32 weeks gestation of pregnancy    3. Advance care planning      Chief Complaint: iron deficiency anemia    Subjective:    History of Present Illness: Ms. Qiu is a 26 y.o. female who presents for evaluation and management of iron deficiency anemia during pregnancy. She is referred by Dr. Gomes.    - she is currently 32 weeks gestational age.  - labs since 2021 reveal a worsening microcytic anemia.  - today, she endorses fatigue, dyspnea upon exertion, ice cravings.  - she endorses a history of menorrhagia before she became pregnant.  - she has been taking oral iron with vitamin C. She denies side effects from oral iron.        Past medical, surgical, family, and social histories have been reviewed and updated below.    Past Medical History:   Past Medical History:   Diagnosis Date    Eczema     Seasonal allergic rhinitis     Vertigo        Past Surgical History:   Past Surgical History:   Procedure Laterality Date    WISDOM TOOTH EXTRACTION         Family History:   Family History   Problem Relation Age of Onset    Coronary artery disease Father     Diabetes Maternal Grandmother     Heart disease Maternal Grandmother     No Known Problems Mother        Social History:  reports that she has never smoked. She has never used smokeless tobacco. She reports that she does not drink alcohol and does not use drugs.    Allergies:  Review of patient's allergies indicates:   Allergen Reactions    Augmentin [amoxicillin-pot clavulanate] Rash    Betamethasone, augmented Diarrhea, Hives, Nausea And Vomiting and Rash       Medications:  Current Outpatient Medications   Medication Sig Dispense Refill    ferrous sulfate 325 (65 FE) MG EC tablet Take 1 tablet (325 mg total) by mouth every other day. 60 tablet 3    prenatal vit no.124/iron/folic (PRENATAL VITAMIN ORAL) Take by mouth.    "   terconazole (TERAZOL 7) 0.4 % Crea Place 1 applicator vaginally once daily. (Patient not taking: No sig reported) 45 g 0     No current facility-administered medications for this visit.       Review of Systems   Constitutional: Positive for fatigue.   HENT: Negative for sore throat.    Eyes: Negative for visual disturbance.   Respiratory: Positive for shortness of breath. Negative for cough.    Cardiovascular: Negative for chest pain.   Gastrointestinal: Negative for abdominal pain, constipation, diarrhea, nausea and vomiting.   Genitourinary: Negative for dysuria.   Musculoskeletal: Negative for back pain.   Skin: Negative for rash.   Neurological: Negative for headaches.   Hematological: Negative for adenopathy.   Psychiatric/Behavioral: The patient is not nervous/anxious.        ECOG Performance Status:   ECOG SCORE    1 - Restricted in strenuous activity-ambulatory and able to carry out work of a light nature         Objective:      Vitals:   Vitals:    06/22/22 1316   BP: 122/76   BP Location: Right arm   Patient Position: Sitting   BP Method: Large (Automatic)   Pulse: 97   Resp: 18   SpO2: 96%   Weight: (!) 137 kg (302 lb 0.5 oz)   Height: 5' 3" (1.6 m)     BMI: Body mass index is 53.5 kg/m².    Physical Exam  Vitals and nursing note reviewed.   Constitutional:       Appearance: She is well-developed.   HENT:      Head: Normocephalic and atraumatic.   Eyes:      Pupils: Pupils are equal, round, and reactive to light.   Cardiovascular:      Rate and Rhythm: Normal rate and regular rhythm.   Pulmonary:      Effort: Pulmonary effort is normal.      Breath sounds: Normal breath sounds.   Abdominal:      General: Bowel sounds are normal.      Palpations: Abdomen is soft.   Musculoskeletal:         General: Normal range of motion.      Cervical back: Normal range of motion and neck supple.   Skin:     General: Skin is warm and dry.   Neurological:      Mental Status: She is alert and oriented to person, place, " and time.   Psychiatric:         Behavior: Behavior normal.         Thought Content: Thought content normal.         Judgment: Judgment normal.         Laboratory Data:  Labs have been reviewed.    Lab Results   Component Value Date    WBC 10.02 05/24/2022    HGB 8.7 (L) 05/24/2022    HCT 29.8 (L) 05/24/2022    MCV 77 (L) 05/24/2022     05/24/2022           Imaging:    Assessment:       1. Iron deficiency anemia during pregnancy    2. 32 weeks gestation of pregnancy    3. Advance care planning           Plan:     1. Iron deficiency anemia  - I have reviewed her chart  - she is currently 34 weeks gestational age.  - labs since 2021 reveal a worsening microcytic anemia.  - she endorses a history of menorrhagia before she became pregnant.  - she has been taking oral iron with vitamin C. She denies side effects from oral iron.  - check iron studies today. Assuming low iron stores, we will proceed with ferric carboxymaltose x 2 doses  - return to clinic in 3 months with repeat labs.    2. Advance Care Planning     Date: 06/22/2022    Power of   I initiated the process of advance care planning today and explained the importance of this process to the patient.  I introduced the concept of advance directives to the patient, as well. Then the patient received detailed information about the importance of designating a Health Care Power of  (HCPOA). She was also instructed to communicate with this person about their wishes for future healthcare, should she become sick and lose decision-making capacity. The patient has not previously appointed a HCPOA. After our discussion, the patient has decided to complete a HCPOA and has appointed her Carmen Qureshi ([phone]), Caitie Sosa ([phone]), and Terrence Dash ([phone]). I spent a total time of 16 minutes discussing this issue with the patient.     - return to clinic in 3 months with repeat labs.      Kojo Reaves  M.D.  Hematology/Oncology  [facility] - [address], [address]  [place] [address]  Phone: [phone]  Fax: [phone]

## 2022-06-22 ENCOUNTER — LAB VISIT (OUTPATIENT)
Dept: LAB | Facility: HOSPITAL | Age: 27
End: 2022-06-22
Attending: INTERNAL MEDICINE
Payer: COMMERCIAL

## 2022-06-22 ENCOUNTER — OFFICE VISIT (OUTPATIENT)
Dept: HEMATOLOGY/ONCOLOGY | Facility: CLINIC | Age: 27
End: 2022-06-22
Payer: COMMERCIAL

## 2022-06-22 VITALS
OXYGEN SATURATION: 96 % | WEIGHT: 293 LBS | RESPIRATION RATE: 18 BRPM | HEIGHT: 63 IN | SYSTOLIC BLOOD PRESSURE: 122 MMHG | BODY MASS INDEX: 51.91 KG/M2 | HEART RATE: 97 BPM | DIASTOLIC BLOOD PRESSURE: 76 MMHG

## 2022-06-22 DIAGNOSIS — D50.9 IRON DEFICIENCY ANEMIA DURING PREGNANCY: Primary | ICD-10-CM

## 2022-06-22 DIAGNOSIS — Z3A.32 32 WEEKS GESTATION OF PREGNANCY: ICD-10-CM

## 2022-06-22 DIAGNOSIS — O99.019 IRON DEFICIENCY ANEMIA DURING PREGNANCY: ICD-10-CM

## 2022-06-22 DIAGNOSIS — D50.9 IRON DEFICIENCY ANEMIA DURING PREGNANCY: ICD-10-CM

## 2022-06-22 DIAGNOSIS — O99.019 IRON DEFICIENCY ANEMIA DURING PREGNANCY: Primary | ICD-10-CM

## 2022-06-22 DIAGNOSIS — Z71.89 ADVANCE CARE PLANNING: ICD-10-CM

## 2022-06-22 LAB
BASOPHILS # BLD AUTO: 0.03 K/UL (ref 0–0.2)
BASOPHILS NFR BLD: 0.3 % (ref 0–1.9)
DIFFERENTIAL METHOD: ABNORMAL
EOSINOPHIL # BLD AUTO: 0.1 K/UL (ref 0–0.5)
EOSINOPHIL NFR BLD: 0.8 % (ref 0–8)
ERYTHROCYTE [DISTWIDTH] IN BLOOD BY AUTOMATED COUNT: 20.1 % (ref 11.5–14.5)
FERRITIN SERPL-MCNC: 4 NG/ML (ref 20–300)
HCT VFR BLD AUTO: 28.9 % (ref 37–48.5)
HGB BLD-MCNC: 8.6 G/DL (ref 12–16)
IMM GRANULOCYTES # BLD AUTO: 0.14 K/UL (ref 0–0.04)
IMM GRANULOCYTES NFR BLD AUTO: 1.3 % (ref 0–0.5)
IRON SERPL-MCNC: 32 UG/DL (ref 30–160)
LYMPHOCYTES # BLD AUTO: 1.8 K/UL (ref 1–4.8)
LYMPHOCYTES NFR BLD: 16.3 % (ref 18–48)
MCH RBC QN AUTO: 22.8 PG (ref 27–31)
MCHC RBC AUTO-ENTMCNC: 29.8 G/DL (ref 32–36)
MCV RBC AUTO: 77 FL (ref 82–98)
MONOCYTES # BLD AUTO: 0.6 K/UL (ref 0.3–1)
MONOCYTES NFR BLD: 5.9 % (ref 4–15)
NEUTROPHILS # BLD AUTO: 8.2 K/UL (ref 1.8–7.7)
NEUTROPHILS NFR BLD: 75.4 % (ref 38–73)
NRBC BLD-RTO: 0 /100 WBC
PLATELET # BLD AUTO: 355 K/UL (ref 150–450)
PMV BLD AUTO: 8.2 FL (ref 9.2–12.9)
RBC # BLD AUTO: 3.77 M/UL (ref 4–5.4)
SATURATED IRON: 6 % (ref 20–50)
TOTAL IRON BINDING CAPACITY: 543 UG/DL (ref 250–450)
TRANSFERRIN SERPL-MCNC: 367 MG/DL (ref 200–375)
WBC # BLD AUTO: 10.86 K/UL (ref 3.9–12.7)

## 2022-06-22 PROCEDURE — 99204 PR OFFICE/OUTPT VISIT, NEW, LEVL IV, 45-59 MIN: ICD-10-PCS | Mod: S$GLB,,, | Performed by: INTERNAL MEDICINE

## 2022-06-22 PROCEDURE — 99497 PR ADVNCD CARE PLAN 30 MIN: ICD-10-PCS | Mod: S$GLB,,, | Performed by: INTERNAL MEDICINE

## 2022-06-22 PROCEDURE — 3044F HG A1C LEVEL LT 7.0%: CPT | Mod: CPTII,S$GLB,, | Performed by: INTERNAL MEDICINE

## 2022-06-22 PROCEDURE — 3008F PR BODY MASS INDEX (BMI) DOCUMENTED: ICD-10-PCS | Mod: CPTII,S$GLB,, | Performed by: INTERNAL MEDICINE

## 2022-06-22 PROCEDURE — 3078F PR MOST RECENT DIASTOLIC BLOOD PRESSURE < 80 MM HG: ICD-10-PCS | Mod: CPTII,S$GLB,, | Performed by: INTERNAL MEDICINE

## 2022-06-22 PROCEDURE — 3078F DIAST BP <80 MM HG: CPT | Mod: CPTII,S$GLB,, | Performed by: INTERNAL MEDICINE

## 2022-06-22 PROCEDURE — 99999 PR PBB SHADOW E&M-EST. PATIENT-LVL IV: ICD-10-PCS | Mod: PBBFAC,,, | Performed by: INTERNAL MEDICINE

## 2022-06-22 PROCEDURE — 85025 COMPLETE CBC W/AUTO DIFF WBC: CPT | Performed by: INTERNAL MEDICINE

## 2022-06-22 PROCEDURE — 99999 PR PBB SHADOW E&M-EST. PATIENT-LVL IV: CPT | Mod: PBBFAC,,, | Performed by: INTERNAL MEDICINE

## 2022-06-22 PROCEDURE — 3074F SYST BP LT 130 MM HG: CPT | Mod: CPTII,S$GLB,, | Performed by: INTERNAL MEDICINE

## 2022-06-22 PROCEDURE — 3008F BODY MASS INDEX DOCD: CPT | Mod: CPTII,S$GLB,, | Performed by: INTERNAL MEDICINE

## 2022-06-22 PROCEDURE — 82728 ASSAY OF FERRITIN: CPT | Performed by: INTERNAL MEDICINE

## 2022-06-22 PROCEDURE — 1159F PR MEDICATION LIST DOCUMENTED IN MEDICAL RECORD: ICD-10-PCS | Mod: CPTII,S$GLB,, | Performed by: INTERNAL MEDICINE

## 2022-06-22 PROCEDURE — 1160F RVW MEDS BY RX/DR IN RCRD: CPT | Mod: CPTII,S$GLB,, | Performed by: INTERNAL MEDICINE

## 2022-06-22 PROCEDURE — 36415 COLL VENOUS BLD VENIPUNCTURE: CPT | Performed by: INTERNAL MEDICINE

## 2022-06-22 PROCEDURE — 1160F PR REVIEW ALL MEDS BY PRESCRIBER/CLIN PHARMACIST DOCUMENTED: ICD-10-PCS | Mod: CPTII,S$GLB,, | Performed by: INTERNAL MEDICINE

## 2022-06-22 PROCEDURE — 99497 ADVNCD CARE PLAN 30 MIN: CPT | Mod: S$GLB,,, | Performed by: INTERNAL MEDICINE

## 2022-06-22 PROCEDURE — 99204 OFFICE O/P NEW MOD 45 MIN: CPT | Mod: S$GLB,,, | Performed by: INTERNAL MEDICINE

## 2022-06-22 PROCEDURE — 1159F MED LIST DOCD IN RCRD: CPT | Mod: CPTII,S$GLB,, | Performed by: INTERNAL MEDICINE

## 2022-06-22 PROCEDURE — 3044F PR MOST RECENT HEMOGLOBIN A1C LEVEL <7.0%: ICD-10-PCS | Mod: CPTII,S$GLB,, | Performed by: INTERNAL MEDICINE

## 2022-06-22 PROCEDURE — 84466 ASSAY OF TRANSFERRIN: CPT | Performed by: INTERNAL MEDICINE

## 2022-06-22 PROCEDURE — 3074F PR MOST RECENT SYSTOLIC BLOOD PRESSURE < 130 MM HG: ICD-10-PCS | Mod: CPTII,S$GLB,, | Performed by: INTERNAL MEDICINE

## 2022-06-22 RX ORDER — SODIUM CHLORIDE 0.9 % (FLUSH) 0.9 %
10 SYRINGE (ML) INJECTION
Status: CANCELLED | OUTPATIENT
Start: 2022-06-29

## 2022-06-22 RX ORDER — HEPARIN 100 UNIT/ML
500 SYRINGE INTRAVENOUS
Status: CANCELLED | OUTPATIENT
Start: 2022-07-06

## 2022-06-22 RX ORDER — HEPARIN 100 UNIT/ML
500 SYRINGE INTRAVENOUS
Status: CANCELLED | OUTPATIENT
Start: 2022-06-29

## 2022-06-22 RX ORDER — SODIUM CHLORIDE 0.9 % (FLUSH) 0.9 %
10 SYRINGE (ML) INJECTION
Status: CANCELLED | OUTPATIENT
Start: 2022-07-06

## 2022-06-23 RX ORDER — HEPARIN 100 UNIT/ML
500 SYRINGE INTRAVENOUS
Status: CANCELLED | OUTPATIENT
Start: 2022-08-09

## 2022-06-23 RX ORDER — SODIUM CHLORIDE 0.9 % (FLUSH) 0.9 %
10 SYRINGE (ML) INJECTION
Status: CANCELLED | OUTPATIENT
Start: 2022-08-09

## 2022-06-23 RX ORDER — SODIUM CHLORIDE 0.9 % (FLUSH) 0.9 %
10 SYRINGE (ML) INJECTION
Status: CANCELLED | OUTPATIENT
Start: 2022-06-30

## 2022-06-23 RX ORDER — SODIUM CHLORIDE 0.9 % (FLUSH) 0.9 %
10 SYRINGE (ML) INJECTION
Status: CANCELLED | OUTPATIENT
Start: 2022-08-02

## 2022-06-23 RX ORDER — SODIUM CHLORIDE 0.9 % (FLUSH) 0.9 %
10 SYRINGE (ML) INJECTION
Status: CANCELLED | OUTPATIENT
Start: 2022-07-26

## 2022-06-23 RX ORDER — HEPARIN 100 UNIT/ML
500 SYRINGE INTRAVENOUS
Status: CANCELLED | OUTPATIENT
Start: 2022-06-30

## 2022-06-23 RX ORDER — HEPARIN 100 UNIT/ML
500 SYRINGE INTRAVENOUS
Status: CANCELLED | OUTPATIENT
Start: 2022-08-02

## 2022-06-23 RX ORDER — HEPARIN 100 UNIT/ML
500 SYRINGE INTRAVENOUS
Status: CANCELLED | OUTPATIENT
Start: 2022-07-26

## 2022-06-24 ENCOUNTER — PATIENT MESSAGE (OUTPATIENT)
Dept: OBSTETRICS AND GYNECOLOGY | Facility: CLINIC | Age: 27
End: 2022-06-24
Payer: COMMERCIAL

## 2022-07-05 ENCOUNTER — PATIENT MESSAGE (OUTPATIENT)
Dept: HEMATOLOGY/ONCOLOGY | Facility: CLINIC | Age: 27
End: 2022-07-05
Payer: COMMERCIAL

## 2022-07-07 ENCOUNTER — PROCEDURE VISIT (OUTPATIENT)
Dept: OBSTETRICS AND GYNECOLOGY | Facility: CLINIC | Age: 27
End: 2022-07-07
Payer: COMMERCIAL

## 2022-07-07 ENCOUNTER — ROUTINE PRENATAL (OUTPATIENT)
Dept: OBSTETRICS AND GYNECOLOGY | Facility: CLINIC | Age: 27
End: 2022-07-07
Payer: COMMERCIAL

## 2022-07-07 VITALS — SYSTOLIC BLOOD PRESSURE: 120 MMHG | WEIGHT: 293 LBS | DIASTOLIC BLOOD PRESSURE: 77 MMHG | BODY MASS INDEX: 54.01 KG/M2

## 2022-07-07 DIAGNOSIS — Z3A.34 34 WEEKS GESTATION OF PREGNANCY: Primary | ICD-10-CM

## 2022-07-07 DIAGNOSIS — Z36.89 ENCOUNTER FOR FETAL ANATOMIC SURVEY: ICD-10-CM

## 2022-07-07 DIAGNOSIS — D25.9 UTERINE LEIOMYOMA, UNSPECIFIED LOCATION: ICD-10-CM

## 2022-07-07 DIAGNOSIS — E66.01 CLASS 3 SEVERE OBESITY WITH BODY MASS INDEX (BMI) OF 50.0 TO 59.9 IN ADULT, UNSPECIFIED OBESITY TYPE, UNSPECIFIED WHETHER SERIOUS COMORBIDITY PRESENT: ICD-10-CM

## 2022-07-07 DIAGNOSIS — E66.9 OBESITY, UNSPECIFIED CLASSIFICATION, UNSPECIFIED OBESITY TYPE, UNSPECIFIED WHETHER SERIOUS COMORBIDITY PRESENT: ICD-10-CM

## 2022-07-07 PROCEDURE — 0502F SUBSEQUENT PRENATAL CARE: CPT | Mod: CPTII,S$GLB,, | Performed by: OBSTETRICS & GYNECOLOGY

## 2022-07-07 PROCEDURE — 76819 US OB/GYN PROCEDURE (VIEWPOINT): ICD-10-PCS | Mod: S$GLB,,, | Performed by: OBSTETRICS & GYNECOLOGY

## 2022-07-07 PROCEDURE — 99999 PR PBB SHADOW E&M-EST. PATIENT-LVL II: CPT | Mod: PBBFAC,,, | Performed by: OBSTETRICS & GYNECOLOGY

## 2022-07-07 PROCEDURE — 76819 FETAL BIOPHYS PROFIL W/O NST: CPT | Mod: S$GLB,,, | Performed by: OBSTETRICS & GYNECOLOGY

## 2022-07-07 PROCEDURE — 0502F PR SUBSEQUENT PRENATAL CARE: ICD-10-PCS | Mod: CPTII,S$GLB,, | Performed by: OBSTETRICS & GYNECOLOGY

## 2022-07-07 PROCEDURE — 99999 PR PBB SHADOW E&M-EST. PATIENT-LVL II: ICD-10-PCS | Mod: PBBFAC,,, | Performed by: OBSTETRICS & GYNECOLOGY

## 2022-07-07 NOTE — PROGRESS NOTES
No c/o today   FHT normal   @  34 3/7 wga  1. iob reviewed; enrolled in connected moms; declines covid and flu vaccine; MT 21 negative; anatomy incomplete so repeat done today, still limited, will repeat at 32 wga; DM test normal; tdap done; 3rd trim labs done    2. Anemia: on iron   3. Obesity: did targeted US.  Early GTT normal;   -Will start  testing since BMI >45. At 34 weeks PP   - on ASA    -breast feeding, discussed how to get pump and latch and holds  -peds: darvin marcus   - no birth plan and desires epidural

## 2022-07-10 ENCOUNTER — PATIENT MESSAGE (OUTPATIENT)
Dept: OBSTETRICS AND GYNECOLOGY | Facility: CLINIC | Age: 27
End: 2022-07-10
Payer: COMMERCIAL

## 2022-07-13 ENCOUNTER — PATIENT MESSAGE (OUTPATIENT)
Dept: OBSTETRICS AND GYNECOLOGY | Facility: CLINIC | Age: 27
End: 2022-07-13
Payer: COMMERCIAL

## 2022-07-14 ENCOUNTER — ROUTINE PRENATAL (OUTPATIENT)
Dept: OBSTETRICS AND GYNECOLOGY | Facility: CLINIC | Age: 27
End: 2022-07-14
Payer: COMMERCIAL

## 2022-07-14 ENCOUNTER — PROCEDURE VISIT (OUTPATIENT)
Dept: OBSTETRICS AND GYNECOLOGY | Facility: CLINIC | Age: 27
End: 2022-07-14
Payer: COMMERCIAL

## 2022-07-14 VITALS — SYSTOLIC BLOOD PRESSURE: 122 MMHG | DIASTOLIC BLOOD PRESSURE: 78 MMHG | BODY MASS INDEX: 54.28 KG/M2 | WEIGHT: 293 LBS

## 2022-07-14 DIAGNOSIS — E66.9 OBESITY, UNSPECIFIED CLASSIFICATION, UNSPECIFIED OBESITY TYPE, UNSPECIFIED WHETHER SERIOUS COMORBIDITY PRESENT: ICD-10-CM

## 2022-07-14 DIAGNOSIS — E66.01 MATERNAL MORBID OBESITY, ANTEPARTUM: ICD-10-CM

## 2022-07-14 DIAGNOSIS — Z3A.35 35 WEEKS GESTATION OF PREGNANCY: Primary | ICD-10-CM

## 2022-07-14 DIAGNOSIS — Z3A.28 28 WEEKS GESTATION OF PREGNANCY: ICD-10-CM

## 2022-07-14 DIAGNOSIS — Z3A.35 35 WEEKS GESTATION OF PREGNANCY: ICD-10-CM

## 2022-07-14 DIAGNOSIS — O99.210 MATERNAL MORBID OBESITY, ANTEPARTUM: ICD-10-CM

## 2022-07-14 PROCEDURE — 0502F SUBSEQUENT PRENATAL CARE: CPT | Mod: CPTII,S$GLB,, | Performed by: OBSTETRICS & GYNECOLOGY

## 2022-07-14 PROCEDURE — 99999 PR PBB SHADOW E&M-EST. PATIENT-LVL II: CPT | Mod: PBBFAC,,, | Performed by: OBSTETRICS & GYNECOLOGY

## 2022-07-14 PROCEDURE — 0502F PR SUBSEQUENT PRENATAL CARE: ICD-10-PCS | Mod: CPTII,S$GLB,, | Performed by: OBSTETRICS & GYNECOLOGY

## 2022-07-14 PROCEDURE — 76819 FETAL BIOPHYS PROFIL W/O NST: CPT | Mod: S$GLB,,, | Performed by: OBSTETRICS & GYNECOLOGY

## 2022-07-14 PROCEDURE — 99999 PR PBB SHADOW E&M-EST. PATIENT-LVL II: ICD-10-PCS | Mod: PBBFAC,,, | Performed by: OBSTETRICS & GYNECOLOGY

## 2022-07-14 PROCEDURE — 76819 PR US, OB, FETAL BIOPHYSICAL, W/O NST: ICD-10-PCS | Mod: S$GLB,,, | Performed by: OBSTETRICS & GYNECOLOGY

## 2022-07-14 NOTE — PROGRESS NOTES
No c/o today   FHT normal   @  35 3/7 wga  1. iob reviewed; enrolled in connected moms; declines covid and flu vaccine; MT 21 negative; anatomy incomplete so repeat done today, still limited, will repeat at 32 wga; DM test normal; tdap done; 3rd trim labs done    2. Anemia: on iron   3. Obesity: did targeted US.  Early GTT normal;   -Will start  testing since BMI >45. At 34 weeks  - on ASA    -breast feeding, discussed how to get pump and latch and holds  -peds: darvin marcus   - no birth plan and desires epidural  -considering IOL, discussed long process of IOL

## 2022-07-21 ENCOUNTER — ROUTINE PRENATAL (OUTPATIENT)
Dept: OBSTETRICS AND GYNECOLOGY | Facility: CLINIC | Age: 27
End: 2022-07-21
Payer: COMMERCIAL

## 2022-07-21 ENCOUNTER — PROCEDURE VISIT (OUTPATIENT)
Dept: OBSTETRICS AND GYNECOLOGY | Facility: CLINIC | Age: 27
End: 2022-07-21
Payer: COMMERCIAL

## 2022-07-21 VITALS — WEIGHT: 293 LBS | DIASTOLIC BLOOD PRESSURE: 72 MMHG | BODY MASS INDEX: 53.51 KG/M2 | SYSTOLIC BLOOD PRESSURE: 122 MMHG

## 2022-07-21 DIAGNOSIS — Z3A.28 28 WEEKS GESTATION OF PREGNANCY: ICD-10-CM

## 2022-07-21 DIAGNOSIS — E66.9 OBESITY, UNSPECIFIED CLASSIFICATION, UNSPECIFIED OBESITY TYPE, UNSPECIFIED WHETHER SERIOUS COMORBIDITY PRESENT: ICD-10-CM

## 2022-07-21 DIAGNOSIS — D25.9 UTERINE LEIOMYOMA, UNSPECIFIED LOCATION: ICD-10-CM

## 2022-07-21 DIAGNOSIS — E28.2 PCOS (POLYCYSTIC OVARIAN SYNDROME): ICD-10-CM

## 2022-07-21 DIAGNOSIS — Z3A.36 36 WEEKS GESTATION OF PREGNANCY: Primary | ICD-10-CM

## 2022-07-21 PROCEDURE — 76819 FETAL BIOPHYS PROFIL W/O NST: CPT | Mod: S$GLB,,, | Performed by: OBSTETRICS & GYNECOLOGY

## 2022-07-21 PROCEDURE — 0502F SUBSEQUENT PRENATAL CARE: CPT | Mod: CPTII,S$GLB,, | Performed by: OBSTETRICS & GYNECOLOGY

## 2022-07-21 PROCEDURE — 99999 PR PBB SHADOW E&M-EST. PATIENT-LVL II: CPT | Mod: PBBFAC,,, | Performed by: OBSTETRICS & GYNECOLOGY

## 2022-07-21 PROCEDURE — 87081 CULTURE SCREEN ONLY: CPT | Performed by: OBSTETRICS & GYNECOLOGY

## 2022-07-21 PROCEDURE — 0502F PR SUBSEQUENT PRENATAL CARE: ICD-10-PCS | Mod: CPTII,S$GLB,, | Performed by: OBSTETRICS & GYNECOLOGY

## 2022-07-21 PROCEDURE — 76819 US OB/GYN PROCEDURE (VIEWPOINT): ICD-10-PCS | Mod: S$GLB,,, | Performed by: OBSTETRICS & GYNECOLOGY

## 2022-07-21 PROCEDURE — 76816 OB US FOLLOW-UP PER FETUS: CPT | Mod: S$GLB,,, | Performed by: OBSTETRICS & GYNECOLOGY

## 2022-07-21 PROCEDURE — 99999 PR PBB SHADOW E&M-EST. PATIENT-LVL II: ICD-10-PCS | Mod: PBBFAC,,, | Performed by: OBSTETRICS & GYNECOLOGY

## 2022-07-21 PROCEDURE — 76816 US OB/GYN PROCEDURE (VIEWPOINT): ICD-10-PCS | Mod: S$GLB,,, | Performed by: OBSTETRICS & GYNECOLOGY

## 2022-07-25 ENCOUNTER — INFUSION (OUTPATIENT)
Dept: INFUSION THERAPY | Facility: HOSPITAL | Age: 27
End: 2022-07-25
Attending: INTERNAL MEDICINE
Payer: COMMERCIAL

## 2022-07-25 VITALS
HEART RATE: 109 BPM | RESPIRATION RATE: 18 BRPM | OXYGEN SATURATION: 100 % | TEMPERATURE: 98 F | SYSTOLIC BLOOD PRESSURE: 143 MMHG | BODY MASS INDEX: 51.91 KG/M2 | WEIGHT: 293 LBS | HEIGHT: 63 IN | DIASTOLIC BLOOD PRESSURE: 75 MMHG

## 2022-07-25 DIAGNOSIS — O99.019 IRON DEFICIENCY ANEMIA DURING PREGNANCY: Primary | ICD-10-CM

## 2022-07-25 DIAGNOSIS — D50.9 IRON DEFICIENCY ANEMIA DURING PREGNANCY: Primary | ICD-10-CM

## 2022-07-25 LAB — BACTERIA SPEC AEROBE CULT: NORMAL

## 2022-07-25 PROCEDURE — A4216 STERILE WATER/SALINE, 10 ML: HCPCS | Performed by: INTERNAL MEDICINE

## 2022-07-25 PROCEDURE — 96365 THER/PROPH/DIAG IV INF INIT: CPT

## 2022-07-25 PROCEDURE — 25000003 PHARM REV CODE 250: Performed by: INTERNAL MEDICINE

## 2022-07-25 PROCEDURE — 63600175 PHARM REV CODE 636 W HCPCS: Performed by: INTERNAL MEDICINE

## 2022-07-25 RX ORDER — SODIUM CHLORIDE 0.9 % (FLUSH) 0.9 %
10 SYRINGE (ML) INJECTION
Status: DISCONTINUED | OUTPATIENT
Start: 2022-07-25 | End: 2022-07-25 | Stop reason: HOSPADM

## 2022-07-25 RX ORDER — HEPARIN 100 UNIT/ML
500 SYRINGE INTRAVENOUS
Status: DISCONTINUED | OUTPATIENT
Start: 2022-07-25 | End: 2022-07-25 | Stop reason: HOSPADM

## 2022-07-25 RX ADMIN — SODIUM CHLORIDE: 9 INJECTION, SOLUTION INTRAVENOUS at 01:07

## 2022-07-25 RX ADMIN — IRON SUCROSE 200 MG: 20 INJECTION, SOLUTION INTRAVENOUS at 01:07

## 2022-07-25 RX ADMIN — Medication 10 ML: at 02:07

## 2022-07-26 ENCOUNTER — PATIENT MESSAGE (OUTPATIENT)
Dept: OBSTETRICS AND GYNECOLOGY | Facility: CLINIC | Age: 27
End: 2022-07-26
Payer: COMMERCIAL

## 2022-07-28 ENCOUNTER — PROCEDURE VISIT (OUTPATIENT)
Dept: OBSTETRICS AND GYNECOLOGY | Facility: CLINIC | Age: 27
End: 2022-07-28
Payer: COMMERCIAL

## 2022-07-28 ENCOUNTER — PATIENT MESSAGE (OUTPATIENT)
Dept: OBSTETRICS AND GYNECOLOGY | Facility: CLINIC | Age: 27
End: 2022-07-28

## 2022-07-28 ENCOUNTER — ROUTINE PRENATAL (OUTPATIENT)
Dept: OBSTETRICS AND GYNECOLOGY | Facility: CLINIC | Age: 27
End: 2022-07-28
Payer: COMMERCIAL

## 2022-07-28 VITALS — WEIGHT: 293 LBS | SYSTOLIC BLOOD PRESSURE: 122 MMHG | DIASTOLIC BLOOD PRESSURE: 80 MMHG | BODY MASS INDEX: 54.56 KG/M2

## 2022-07-28 DIAGNOSIS — Z34.90 ENCOUNTER FOR ELECTIVE INDUCTION OF LABOR: Primary | ICD-10-CM

## 2022-07-28 DIAGNOSIS — Z3A.37 37 WEEKS GESTATION OF PREGNANCY: Primary | ICD-10-CM

## 2022-07-28 DIAGNOSIS — Z3A.28 28 WEEKS GESTATION OF PREGNANCY: ICD-10-CM

## 2022-07-28 DIAGNOSIS — E66.9 OBESITY, UNSPECIFIED CLASSIFICATION, UNSPECIFIED OBESITY TYPE, UNSPECIFIED WHETHER SERIOUS COMORBIDITY PRESENT: ICD-10-CM

## 2022-07-28 PROCEDURE — 0502F PR SUBSEQUENT PRENATAL CARE: ICD-10-PCS | Mod: CPTII,S$GLB,, | Performed by: OBSTETRICS & GYNECOLOGY

## 2022-07-28 PROCEDURE — 76819 US OB/GYN PROCEDURE (VIEWPOINT): ICD-10-PCS | Mod: S$GLB,,, | Performed by: OBSTETRICS & GYNECOLOGY

## 2022-07-28 PROCEDURE — 99999 PR PBB SHADOW E&M-EST. PATIENT-LVL II: ICD-10-PCS | Mod: PBBFAC,,, | Performed by: OBSTETRICS & GYNECOLOGY

## 2022-07-28 PROCEDURE — 0502F SUBSEQUENT PRENATAL CARE: CPT | Mod: CPTII,S$GLB,, | Performed by: OBSTETRICS & GYNECOLOGY

## 2022-07-28 PROCEDURE — 76819 FETAL BIOPHYS PROFIL W/O NST: CPT | Mod: S$GLB,,, | Performed by: OBSTETRICS & GYNECOLOGY

## 2022-07-28 PROCEDURE — 99999 PR PBB SHADOW E&M-EST. PATIENT-LVL II: CPT | Mod: PBBFAC,,, | Performed by: OBSTETRICS & GYNECOLOGY

## 2022-07-28 NOTE — PROGRESS NOTES
[facility] at [address] [facility] [facility], [address] [place] [address]  Phone: [phone]      Fax:  [phone]    Plan of Care/ Statement of Necessity for Physical Therapy Services  Patient name: Yasir Phillips Start of Care: 2022   Referral source: Yury Deleon MD : 1959    Medical Diagnosis: Pain in left knee [M25.562]  Payor: [employer] / Plan: [employer] / Product Type: HMO /  Onset Date:22    Treatment Diagnosis: Left Knee Pain   Prior Hospitalization: see medical history Provider#: [id number]   Medications: Verified on Patient summary List   Comorbidities: OA, Thyroid Problems, HTN, Visual Impaired  Prior Level of Function: able to do usual workout routine at least 3 times a week      The Plan of Care and following information is based on the information from the initial evaluation. Assessment/ key information: Patient is a 58 y.o. female referred to PT with the above Dx. Patient seen today S/P left TKR on 22. Pain with bending and patient reports that she is not able to do any of her usual exercise.      Patient presents to PT with an impaired gait, decreased balance, decreased strength, decreased flexibility, and decreased mobility of the left knee. Patient s/s appear to be consistent w/ diagnosis. Patient demonstrates the potential to make functional gains within a reasonable time frame. Patient will benefit from skilled PT to address impairments and improve functional mobility, strength, gait and balance for an improved quality of life.   Fall Risk Assessment: Patient demonstrates no Fall Risk  Evaluation Complexity History MEDIUM  Complexity : 1-2 comorbidities / personal factors will impact the outcome/ POC ; Examination LOW Complexity : 1-2 Standardized tests and measures addressing body structure, function, activity limitation and / or participation in recreation  ;Presentation LOW Complexity : Stable, uncomplicated  ;Clinical No c/o today   FHT normal   Cervix 1/t/h  @  37 3/ wga  1. iob reviewed; enrolled in connected moms; declines covid and flu vaccine; MT 21 negative; anatomy incomplete so repeat done today, still limited; DM test normal; tdap done; 3rd trim labs done    2. Anemia: on iron   3. Obesity: did targeted US.  Early GTT normal;   -Will start  testing since BMI >45. At 34 weeks  - on ASA    -breast feeding, discussed how to get pump and latch and holds  -peds: darvin marcus   - no birth plan and desires epidural  -considering IOL, discussed long process of IOL, she would like to do it. Understands will likely take 1-2 days.          Decision Making MEDIUM Complexity : FOTO score of 26-74  Overall Complexity Rating: LOW   Problem List: pain affecting function, decrease ROM, decrease strength, impaired gait/ balance, decrease ADL/ functional abilitiies, decrease activity tolerance, decrease flexibility/ joint mobility, decrease transfer abilities and other FOTO = 33   Treatment Plan may include any combination of the following: Therapeutic exercise, Therapeutic activities, Neuromuscular re-education, Physical agent/modality, Gait/balance training, Manual therapy, Patient education, Self Care training, Functional mobility training, Home safety training and Stair training  Patient / Family readiness to learn indicated by: asking questions, trying to perform skills and interest  Persons(s) to be included in education: patient (P)  Barriers to Learning/Limitations: yes;  sensory deficits-vision/hearing/speech  Patient Goal (s): Get pretty normal use out of my knee  Patient Self Reported Health Status: good  Rehabilitation Potential: good    Short Term Goals: To be accomplished in 5 treatments:  1. Pt will be compliant and independent with HEP in order to facilitate PT sessions and aid with self management   Eval Status:  Initiated   Current Status:  2. Pt to tolerate 30 min or more of TE and/or Interventions w/o increased s/s   Eval Status:  Initiated   Current Status:    Long Term Goals: To be accomplished in 10 treatments:  1. Pt will report 50% improvement or better with dysfunction to show a significant increase in ability to tolerate ADL   Eval Status:  Initiated   Current Status:  2. Pt will have decreased pain at 2/10 or better to allow her to tolerate increased activity levels for progress to return to performing her usual activity   Eval Status:  Pain 5/10   Current Status:  3.   Pt will demonstrate PRE 3x10 reps with little to no difficulty for carryover to return working out 3x/wk with little difficulty     Eval Status:  Not able to do usual workout routine 3x/wk   Current Status:  4. Pt will ambulate 1/2 mile on TM with a good pace and little to no difficulty for carryover to walking 1 mile or more for exercise with little difficulty      Eval Status:  Not able to walk for exercise   Current Status:  5. Pt will improve FOTO score to 55 in 14 visits to show significant improvement in function for progress to performing usual activity   Eval Status: FOTO 33    Current Status:     Frequency / Duration: Patient to be seen 3 times per week for 10 treatments. Patient/ Caregiver education and instruction: Diagnosis, prognosis, self care, activity modification and exercises   [x]  Plan of care has been reviewed with NATA Mcginnis, PT 2/18/2022 3:31 PM  _____________________________________________________________________  I certify that the above Therapy Services are being furnished while the patient is under my care. I agree with the treatment plan and certify that this therapy is necessary.     [de-identified] Signature:____________Date:_________TIME:________     Olive Montes MD  ** Signature, Date and Time must be completed for valid certification **    Please sign and return to [facility] at [address] [facility]  [place], [facility] [facility], [address]  Phone: [phone]      Fax:  [phone]

## 2022-08-01 ENCOUNTER — INFUSION (OUTPATIENT)
Dept: INFUSION THERAPY | Facility: HOSPITAL | Age: 27
End: 2022-08-01
Attending: INTERNAL MEDICINE
Payer: COMMERCIAL

## 2022-08-01 VITALS
HEIGHT: 63 IN | BODY MASS INDEX: 51.91 KG/M2 | OXYGEN SATURATION: 96 % | TEMPERATURE: 98 F | RESPIRATION RATE: 18 BRPM | SYSTOLIC BLOOD PRESSURE: 129 MMHG | DIASTOLIC BLOOD PRESSURE: 62 MMHG | HEART RATE: 90 BPM | WEIGHT: 293 LBS

## 2022-08-01 DIAGNOSIS — O99.019 IRON DEFICIENCY ANEMIA DURING PREGNANCY: Primary | ICD-10-CM

## 2022-08-01 DIAGNOSIS — D50.9 IRON DEFICIENCY ANEMIA DURING PREGNANCY: Primary | ICD-10-CM

## 2022-08-01 PROCEDURE — 96365 THER/PROPH/DIAG IV INF INIT: CPT

## 2022-08-01 PROCEDURE — 25000003 PHARM REV CODE 250: Performed by: INTERNAL MEDICINE

## 2022-08-01 PROCEDURE — 63600175 PHARM REV CODE 636 W HCPCS: Performed by: INTERNAL MEDICINE

## 2022-08-01 PROCEDURE — A4216 STERILE WATER/SALINE, 10 ML: HCPCS | Performed by: INTERNAL MEDICINE

## 2022-08-01 RX ORDER — SODIUM CHLORIDE 0.9 % (FLUSH) 0.9 %
10 SYRINGE (ML) INJECTION
Status: DISCONTINUED | OUTPATIENT
Start: 2022-08-01 | End: 2022-08-01 | Stop reason: HOSPADM

## 2022-08-01 RX ADMIN — SODIUM CHLORIDE: 9 INJECTION, SOLUTION INTRAVENOUS at 01:08

## 2022-08-01 RX ADMIN — IRON SUCROSE 200 MG: 20 INJECTION, SOLUTION INTRAVENOUS at 01:08

## 2022-08-01 RX ADMIN — Medication 10 ML: at 02:08

## 2022-08-02 ENCOUNTER — HOSPITAL ENCOUNTER (OUTPATIENT)
Facility: HOSPITAL | Age: 27
Discharge: HOME | End: 2022-08-02
Attending: OBSTETRICS & GYNECOLOGY | Admitting: OBSTETRICS & GYNECOLOGY
Payer: COMMERCIAL

## 2022-08-02 ENCOUNTER — PATIENT MESSAGE (OUTPATIENT)
Dept: OBSTETRICS AND GYNECOLOGY | Facility: CLINIC | Age: 27
End: 2022-08-02
Payer: COMMERCIAL

## 2022-08-02 VITALS — DIASTOLIC BLOOD PRESSURE: 60 MMHG | OXYGEN SATURATION: 100 % | SYSTOLIC BLOOD PRESSURE: 125 MMHG | HEART RATE: 102 BPM

## 2022-08-02 DIAGNOSIS — O16.9 ELEVATED BLOOD PRESSURE AFFECTING PREGNANCY, ANTEPARTUM: ICD-10-CM

## 2022-08-02 PROCEDURE — 25000003 PHARM REV CODE 250: Performed by: OBSTETRICS & GYNECOLOGY

## 2022-08-02 PROCEDURE — 59025 FETAL NON-STRESS TEST: CPT

## 2022-08-02 PROCEDURE — 99211 OFF/OP EST MAY X REQ PHY/QHP: CPT | Mod: 25

## 2022-08-02 RX ORDER — ACETAMINOPHEN 500 MG
500 TABLET ORAL EVERY 6 HOURS PRN
Status: DISCONTINUED | OUTPATIENT
Start: 2022-08-02 | End: 2022-08-02 | Stop reason: HOSPADM

## 2022-08-02 RX ADMIN — ACETAMINOPHEN 500 MG: 500 TABLET ORAL at 05:08

## 2022-08-02 NOTE — NURSING
"26 year old G 1 P 0 at 38.1 wks with c/o elevated BP at home, pt reports "130s/90s", encouraged to come to L&D by Dr Gomes.   History/complications of none. Denies vaginal bleeding, reports leaking fluid "mucousy discharge". Fetus: fetal heart tones 160s, patient reports fetal movements. Contractions pt denies. Abdomen morbidly obese, gravid. Vital signs /71, reports 2/10 headache did not take tylenol today. Cervix: not assessed .Educated on electronic fetal monitoring and assessments. Questions answered. Will update Dr. Gomes.  Urine sample collected and held.  "

## 2022-08-02 NOTE — NURSING
Discharge instructions printed and reviewed with patient and family. Labor precautions given, patient instructed on when to return to labor and delivery, reviewed printed instructions with patient. Given PO tylenol for pain, monitors removed.

## 2022-08-02 NOTE — NURSING
Patient is complaining of L ankle swelling. She does not c/o redness, tenderness, swelling to leg. Palpated L ankle up to L calf, no s/s of pain. Pedal pulse 2+. Per Dr oGmes, serial BP's for one hour then, if all WDL, can be discharged.

## 2022-08-02 NOTE — NURSING
Dr Gomes informed of patient arrival, disposition, and complaints. Per MD, patient told office staff she was having redness and swelling in her leg. Will confer with patient. Orders received.

## 2022-08-04 ENCOUNTER — PROCEDURE VISIT (OUTPATIENT)
Dept: OBSTETRICS AND GYNECOLOGY | Facility: CLINIC | Age: 27
End: 2022-08-04
Payer: COMMERCIAL

## 2022-08-04 ENCOUNTER — PATIENT MESSAGE (OUTPATIENT)
Dept: OBSTETRICS AND GYNECOLOGY | Facility: CLINIC | Age: 27
End: 2022-08-04

## 2022-08-04 ENCOUNTER — ROUTINE PRENATAL (OUTPATIENT)
Dept: OBSTETRICS AND GYNECOLOGY | Facility: CLINIC | Age: 27
End: 2022-08-04
Payer: COMMERCIAL

## 2022-08-04 VITALS — SYSTOLIC BLOOD PRESSURE: 120 MMHG | BODY MASS INDEX: 54.6 KG/M2 | WEIGHT: 293 LBS | DIASTOLIC BLOOD PRESSURE: 80 MMHG

## 2022-08-04 DIAGNOSIS — E66.9 OBESITY, UNSPECIFIED CLASSIFICATION, UNSPECIFIED OBESITY TYPE, UNSPECIFIED WHETHER SERIOUS COMORBIDITY PRESENT: ICD-10-CM

## 2022-08-04 DIAGNOSIS — Z3A.39 39 WEEKS GESTATION OF PREGNANCY: ICD-10-CM

## 2022-08-04 DIAGNOSIS — Z3A.28 28 WEEKS GESTATION OF PREGNANCY: ICD-10-CM

## 2022-08-04 DIAGNOSIS — Z34.90 ENCOUNTER FOR ELECTIVE INDUCTION OF LABOR: ICD-10-CM

## 2022-08-04 DIAGNOSIS — Z3A.38 38 WEEKS GESTATION OF PREGNANCY: Primary | ICD-10-CM

## 2022-08-04 PROCEDURE — 76819 FETAL BIOPHYS PROFIL W/O NST: CPT | Mod: S$GLB,,, | Performed by: OBSTETRICS & GYNECOLOGY

## 2022-08-04 PROCEDURE — 76819 US OB/GYN PROCEDURE (VIEWPOINT): ICD-10-PCS | Mod: S$GLB,,, | Performed by: OBSTETRICS & GYNECOLOGY

## 2022-08-04 PROCEDURE — 0502F SUBSEQUENT PRENATAL CARE: CPT | Mod: S$GLB,,, | Performed by: OBSTETRICS & GYNECOLOGY

## 2022-08-04 PROCEDURE — 99999 PR PBB SHADOW E&M-EST. PATIENT-LVL II: ICD-10-PCS | Mod: PBBFAC,,, | Performed by: OBSTETRICS & GYNECOLOGY

## 2022-08-04 PROCEDURE — 99999 PR PBB SHADOW E&M-EST. PATIENT-LVL II: CPT | Mod: PBBFAC,,, | Performed by: OBSTETRICS & GYNECOLOGY

## 2022-08-04 PROCEDURE — 0502F PR SUBSEQUENT PRENATAL CARE: ICD-10-PCS | Mod: S$GLB,,, | Performed by: OBSTETRICS & GYNECOLOGY

## 2022-08-04 NOTE — PROGRESS NOTES
No c/o today   FHT normal   Cervix 1.5/50/h  @  38 3/7 wga  1. iob reviewed; enrolled in connected moms; declines covid and flu vaccine; MT 21 negative; anatomy incomplete so repeat done today, still limited; DM test normal; tdap done; 3rd trim labs done    2. Anemia: on iron   3. Obesity: did targeted US.  Early GTT normal;   -Will start  testing since BMI >45. At 34 weeks  - on ASA    -breast feeding, discussed how to get pump and latch and holds  -peds: darvin marcus   - no birth plan and desires epidural  -desires IOL, discussed long process of IOL, she would like to do it. Understands will likely take 1-2 days. Set up on 8/10 at 6 pm

## 2022-08-07 RX ORDER — MISOPROSTOL 200 UG/1
800 TABLET ORAL
Status: CANCELLED | OUTPATIENT
Start: 2022-08-07

## 2022-08-07 RX ORDER — SODIUM CHLORIDE 9 MG/ML
INJECTION, SOLUTION INTRAVENOUS
Status: CANCELLED | OUTPATIENT
Start: 2022-08-07

## 2022-08-07 RX ORDER — ONDANSETRON 2 MG/ML
8 INJECTION INTRAMUSCULAR; INTRAVENOUS EVERY 6 HOURS PRN
Status: CANCELLED | OUTPATIENT
Start: 2022-08-07

## 2022-08-07 RX ORDER — BUTORPHANOL TARTRATE 2 MG/ML
2 INJECTION INTRAMUSCULAR; INTRAVENOUS
Status: CANCELLED | OUTPATIENT
Start: 2022-08-07

## 2022-08-07 RX ORDER — LIDOCAINE HYDROCHLORIDE 10 MG/ML
10 INJECTION INFILTRATION; PERINEURAL ONCE AS NEEDED
Status: CANCELLED | OUTPATIENT
Start: 2022-08-07 | End: 2034-01-03

## 2022-08-07 RX ORDER — OXYTOCIN/RINGER'S LACTATE 30/500 ML
0-30 PLASTIC BAG, INJECTION (ML) INTRAVENOUS CONTINUOUS
Status: CANCELLED | OUTPATIENT
Start: 2022-08-07

## 2022-08-07 RX ORDER — BUTORPHANOL TARTRATE 1 MG/ML
1 INJECTION INTRAMUSCULAR; INTRAVENOUS
Status: CANCELLED | OUTPATIENT
Start: 2022-08-07

## 2022-08-07 RX ORDER — PROCHLORPERAZINE EDISYLATE 5 MG/ML
5 INJECTION INTRAMUSCULAR; INTRAVENOUS EVERY 6 HOURS PRN
Status: CANCELLED | OUTPATIENT
Start: 2022-08-07

## 2022-08-07 RX ORDER — METHYLERGONOVINE MALEATE 0.2 MG/ML
200 INJECTION INTRAVENOUS
Status: CANCELLED | OUTPATIENT
Start: 2022-08-07

## 2022-08-07 RX ORDER — TERBUTALINE SULFATE 1 MG/ML
0.25 INJECTION SUBCUTANEOUS
Status: CANCELLED | OUTPATIENT
Start: 2022-08-07

## 2022-08-07 RX ORDER — DIPHENOXYLATE HYDROCHLORIDE AND ATROPINE SULFATE 2.5; .025 MG/1; MG/1
1 TABLET ORAL 4 TIMES DAILY PRN
Status: CANCELLED | OUTPATIENT
Start: 2022-08-07

## 2022-08-07 RX ORDER — MISOPROSTOL 100 MCG
25 TABLET ORAL EVERY 4 HOURS PRN
Status: CANCELLED | OUTPATIENT
Start: 2022-08-07

## 2022-08-07 RX ORDER — OXYTOCIN/RINGER'S LACTATE 30/500 ML
334 PLASTIC BAG, INJECTION (ML) INTRAVENOUS ONCE
Status: CANCELLED | OUTPATIENT
Start: 2022-08-07 | End: 2022-08-07

## 2022-08-07 RX ORDER — SIMETHICONE 80 MG
1 TABLET,CHEWABLE ORAL 4 TIMES DAILY PRN
Status: CANCELLED | OUTPATIENT
Start: 2022-08-07

## 2022-08-07 RX ORDER — MUPIROCIN 20 MG/G
OINTMENT TOPICAL
Status: CANCELLED | OUTPATIENT
Start: 2022-08-07

## 2022-08-07 RX ORDER — CALCIUM CARBONATE 200(500)MG
500 TABLET,CHEWABLE ORAL 3 TIMES DAILY PRN
Status: CANCELLED | OUTPATIENT
Start: 2022-08-07

## 2022-08-07 RX ORDER — CARBOPROST TROMETHAMINE 250 UG/ML
250 INJECTION, SOLUTION INTRAMUSCULAR
Status: CANCELLED | OUTPATIENT
Start: 2022-08-07

## 2022-08-07 RX ORDER — OXYTOCIN/RINGER'S LACTATE 30/500 ML
95 PLASTIC BAG, INJECTION (ML) INTRAVENOUS ONCE
Status: CANCELLED | OUTPATIENT
Start: 2022-08-07 | End: 2022-08-07

## 2022-08-07 RX ORDER — ACETAMINOPHEN 325 MG/1
650 TABLET ORAL EVERY 6 HOURS PRN
Status: CANCELLED | OUTPATIENT
Start: 2022-08-07

## 2022-08-08 ENCOUNTER — INFUSION (OUTPATIENT)
Dept: INFUSION THERAPY | Facility: HOSPITAL | Age: 27
End: 2022-08-08
Attending: INTERNAL MEDICINE
Payer: COMMERCIAL

## 2022-08-08 VITALS
BODY MASS INDEX: 51.91 KG/M2 | HEIGHT: 63 IN | SYSTOLIC BLOOD PRESSURE: 120 MMHG | RESPIRATION RATE: 18 BRPM | HEART RATE: 91 BPM | WEIGHT: 293 LBS | TEMPERATURE: 98 F | OXYGEN SATURATION: 100 % | DIASTOLIC BLOOD PRESSURE: 28 MMHG

## 2022-08-08 DIAGNOSIS — D50.9 IRON DEFICIENCY ANEMIA DURING PREGNANCY: Primary | ICD-10-CM

## 2022-08-08 DIAGNOSIS — O99.019 IRON DEFICIENCY ANEMIA DURING PREGNANCY: Primary | ICD-10-CM

## 2022-08-08 PROCEDURE — 25000003 PHARM REV CODE 250: Performed by: INTERNAL MEDICINE

## 2022-08-08 PROCEDURE — 63600175 PHARM REV CODE 636 W HCPCS: Performed by: INTERNAL MEDICINE

## 2022-08-08 PROCEDURE — 96365 THER/PROPH/DIAG IV INF INIT: CPT

## 2022-08-08 RX ORDER — SODIUM CHLORIDE 0.9 % (FLUSH) 0.9 %
10 SYRINGE (ML) INJECTION
Status: DISCONTINUED | OUTPATIENT
Start: 2022-08-08 | End: 2022-08-08 | Stop reason: HOSPADM

## 2022-08-08 RX ADMIN — SODIUM CHLORIDE: 9 INJECTION, SOLUTION INTRAVENOUS at 02:08

## 2022-08-08 RX ADMIN — IRON SUCROSE 200 MG: 20 INJECTION, SOLUTION INTRAVENOUS at 02:08

## 2022-08-08 NOTE — NURSING
PT tolerated Venofer infusion well. No adverse reaction noted. Pt education reinforced on Venofer side effects, what to expect and when to call Dr. Reaves. Pt verbalized understanding. PIV d/c per protocol, pt tolerated well.

## 2022-08-10 ENCOUNTER — PATIENT MESSAGE (OUTPATIENT)
Dept: OBSTETRICS AND GYNECOLOGY | Facility: CLINIC | Age: 27
End: 2022-08-10
Payer: COMMERCIAL

## 2022-08-11 ENCOUNTER — HOSPITAL ENCOUNTER (INPATIENT)
Facility: HOSPITAL | Age: 27
LOS: 2 days | Discharge: HOME | End: 2022-08-13
Attending: OBSTETRICS & GYNECOLOGY | Admitting: OBSTETRICS & GYNECOLOGY
Payer: COMMERCIAL

## 2022-08-11 ENCOUNTER — PATIENT MESSAGE (OUTPATIENT)
Dept: OBSTETRICS AND GYNECOLOGY | Facility: CLINIC | Age: 27
End: 2022-08-11
Payer: COMMERCIAL

## 2022-08-11 ENCOUNTER — ANESTHESIA (OUTPATIENT)
Dept: OBSTETRICS AND GYNECOLOGY | Facility: HOSPITAL | Age: 27
End: 2022-08-11
Payer: COMMERCIAL

## 2022-08-11 ENCOUNTER — ANESTHESIA EVENT (OUTPATIENT)
Dept: OBSTETRICS AND GYNECOLOGY | Facility: HOSPITAL | Age: 27
End: 2022-08-11
Payer: COMMERCIAL

## 2022-08-11 DIAGNOSIS — Z37.9 VACUUM-ASSISTED VAGINAL DELIVERY: ICD-10-CM

## 2022-08-11 DIAGNOSIS — Z3A.39 39 WEEKS GESTATION OF PREGNANCY: ICD-10-CM

## 2022-08-11 DIAGNOSIS — Z34.90 ENCOUNTER FOR ELECTIVE INDUCTION OF LABOR: ICD-10-CM

## 2022-08-11 LAB
ABO + RH BLD: NORMAL
ALBUMIN SERPL BCP-MCNC: 2.5 G/DL (ref 3.5–5.2)
ALP SERPL-CCNC: 95 U/L (ref 55–135)
ALT SERPL W/O P-5'-P-CCNC: 9 U/L (ref 10–44)
ANION GAP SERPL CALC-SCNC: 9 MMOL/L (ref 8–16)
AST SERPL-CCNC: 8 U/L (ref 10–40)
BASOPHILS # BLD AUTO: 0.03 K/UL (ref 0–0.2)
BASOPHILS NFR BLD: 0.3 % (ref 0–1.9)
BILIRUB SERPL-MCNC: 0.2 MG/DL (ref 0.1–1)
BLD GP AB SCN CELLS X3 SERPL QL: NORMAL
BUN SERPL-MCNC: 7 MG/DL (ref 6–20)
CALCIUM SERPL-MCNC: 8.6 MG/DL (ref 8.7–10.5)
CHLORIDE SERPL-SCNC: 107 MMOL/L (ref 95–110)
CO2 SERPL-SCNC: 19 MMOL/L (ref 23–29)
CREAT SERPL-MCNC: 0.5 MG/DL (ref 0.5–1.4)
DIFFERENTIAL METHOD: ABNORMAL
EOSINOPHIL # BLD AUTO: 0.1 K/UL (ref 0–0.5)
EOSINOPHIL NFR BLD: 0.5 % (ref 0–8)
ERYTHROCYTE [DISTWIDTH] IN BLOOD BY AUTOMATED COUNT: 22.7 % (ref 11.5–14.5)
EST. GFR  (NO RACE VARIABLE): >60 ML/MIN/1.73 M^2
GLUCOSE SERPL-MCNC: 74 MG/DL (ref 70–110)
HCT VFR BLD AUTO: 32.1 % (ref 37–48.5)
HGB BLD-MCNC: 9.8 G/DL (ref 12–16)
IMM GRANULOCYTES # BLD AUTO: 0.17 K/UL (ref 0–0.04)
IMM GRANULOCYTES NFR BLD AUTO: 1.8 % (ref 0–0.5)
LYMPHOCYTES # BLD AUTO: 1.4 K/UL (ref 1–4.8)
LYMPHOCYTES NFR BLD: 14.8 % (ref 18–48)
MCH RBC QN AUTO: 23.7 PG (ref 27–31)
MCHC RBC AUTO-ENTMCNC: 30.5 G/DL (ref 32–36)
MCV RBC AUTO: 78 FL (ref 82–98)
MONOCYTES # BLD AUTO: 0.5 K/UL (ref 0.3–1)
MONOCYTES NFR BLD: 5.8 % (ref 4–15)
NEUTROPHILS # BLD AUTO: 7.1 K/UL (ref 1.8–7.7)
NEUTROPHILS NFR BLD: 76.8 % (ref 38–73)
NRBC BLD-RTO: 0 /100 WBC
PLATELET # BLD AUTO: 384 K/UL (ref 150–450)
PMV BLD AUTO: 8.1 FL (ref 9.2–12.9)
POTASSIUM SERPL-SCNC: 3.7 MMOL/L (ref 3.5–5.1)
PROT SERPL-MCNC: 6.8 G/DL (ref 6–8.4)
RBC # BLD AUTO: 4.14 M/UL (ref 4–5.4)
SARS-COV-2 RDRP RESP QL NAA+PROBE: NEGATIVE
SODIUM SERPL-SCNC: 135 MMOL/L (ref 136–145)
WBC # BLD AUTO: 9.19 K/UL (ref 3.9–12.7)

## 2022-08-11 PROCEDURE — 36415 COLL VENOUS BLD VENIPUNCTURE: CPT | Performed by: OBSTETRICS & GYNECOLOGY

## 2022-08-11 PROCEDURE — 27000181 HC CABLE, IUPC

## 2022-08-11 PROCEDURE — 80053 COMPREHEN METABOLIC PANEL: CPT | Performed by: OBSTETRICS & GYNECOLOGY

## 2022-08-11 PROCEDURE — 86901 BLOOD TYPING SEROLOGIC RH(D): CPT | Performed by: OBSTETRICS & GYNECOLOGY

## 2022-08-11 PROCEDURE — 62326 NJX INTERLAMINAR LMBR/SAC: CPT | Performed by: ANESTHESIOLOGY

## 2022-08-11 PROCEDURE — 11000001 HC ACUTE MED/SURG PRIVATE ROOM

## 2022-08-11 PROCEDURE — 72100002 HC LABOR CARE, 1ST 8 HOURS

## 2022-08-11 PROCEDURE — 85025 COMPLETE CBC W/AUTO DIFF WBC: CPT | Performed by: OBSTETRICS & GYNECOLOGY

## 2022-08-11 PROCEDURE — U0002 COVID-19 LAB TEST NON-CDC: HCPCS | Performed by: OBSTETRICS & GYNECOLOGY

## 2022-08-11 PROCEDURE — 27200710 HC EPIDURAL INFUSION PUMP SET: Performed by: ANESTHESIOLOGY

## 2022-08-11 PROCEDURE — 25000003 PHARM REV CODE 250: Performed by: OBSTETRICS & GYNECOLOGY

## 2022-08-11 PROCEDURE — 63600175 PHARM REV CODE 636 W HCPCS: Performed by: OBSTETRICS & GYNECOLOGY

## 2022-08-11 PROCEDURE — 25000003 PHARM REV CODE 250: Performed by: ANESTHESIOLOGY

## 2022-08-11 PROCEDURE — 27800516 HC TRAY, EPIDURAL COMBO: Performed by: ANESTHESIOLOGY

## 2022-08-11 RX ORDER — TERBUTALINE SULFATE 1 MG/ML
0.25 INJECTION SUBCUTANEOUS
Status: DISCONTINUED | OUTPATIENT
Start: 2022-08-11 | End: 2022-08-12

## 2022-08-11 RX ORDER — BUTORPHANOL TARTRATE 2 MG/ML
2 INJECTION INTRAMUSCULAR; INTRAVENOUS
Status: DISCONTINUED | OUTPATIENT
Start: 2022-08-11 | End: 2022-08-12

## 2022-08-11 RX ORDER — ONDANSETRON 2 MG/ML
8 INJECTION INTRAMUSCULAR; INTRAVENOUS EVERY 6 HOURS PRN
Status: DISCONTINUED | OUTPATIENT
Start: 2022-08-11 | End: 2022-08-12

## 2022-08-11 RX ORDER — METHYLERGONOVINE MALEATE 0.2 MG/ML
200 INJECTION INTRAVENOUS
Status: DISCONTINUED | OUTPATIENT
Start: 2022-08-11 | End: 2022-08-12

## 2022-08-11 RX ORDER — LIDOCAINE HYDROCHLORIDE 10 MG/ML
INJECTION, SOLUTION EPIDURAL; INFILTRATION; INTRACAUDAL; PERINEURAL
Status: COMPLETED | OUTPATIENT
Start: 2022-08-11 | End: 2022-08-11

## 2022-08-11 RX ORDER — DIPHENOXYLATE HYDROCHLORIDE AND ATROPINE SULFATE 2.5; .025 MG/1; MG/1
1 TABLET ORAL 4 TIMES DAILY PRN
Status: DISCONTINUED | OUTPATIENT
Start: 2022-08-11 | End: 2022-08-12

## 2022-08-11 RX ORDER — FENTANYL/ROPIVACAINE/NS/PF 2MCG/ML-.2
PLASTIC BAG, INJECTION (ML) INJECTION CONTINUOUS PRN
Status: DISCONTINUED | OUTPATIENT
Start: 2022-08-11 | End: 2022-08-11

## 2022-08-11 RX ORDER — MISOPROSTOL 200 UG/1
800 TABLET ORAL
Status: DISCONTINUED | OUTPATIENT
Start: 2022-08-11 | End: 2022-08-12

## 2022-08-11 RX ORDER — SIMETHICONE 80 MG
1 TABLET,CHEWABLE ORAL 4 TIMES DAILY PRN
Status: DISCONTINUED | OUTPATIENT
Start: 2022-08-11 | End: 2022-08-12

## 2022-08-11 RX ORDER — FENTANYL/ROPIVACAINE/NS/PF 2MCG/ML-.2
PLASTIC BAG, INJECTION (ML) INJECTION
Status: COMPLETED
Start: 2022-08-11 | End: 2022-08-11

## 2022-08-11 RX ORDER — PROCHLORPERAZINE EDISYLATE 5 MG/ML
5 INJECTION INTRAMUSCULAR; INTRAVENOUS EVERY 6 HOURS PRN
Status: DISCONTINUED | OUTPATIENT
Start: 2022-08-11 | End: 2022-08-12

## 2022-08-11 RX ORDER — CARBOPROST TROMETHAMINE 250 UG/ML
250 INJECTION, SOLUTION INTRAMUSCULAR
Status: DISCONTINUED | OUTPATIENT
Start: 2022-08-11 | End: 2022-08-12

## 2022-08-11 RX ORDER — OXYTOCIN/RINGER'S LACTATE 30/500 ML
0-30 PLASTIC BAG, INJECTION (ML) INTRAVENOUS CONTINUOUS
Status: DISCONTINUED | OUTPATIENT
Start: 2022-08-11 | End: 2022-08-12

## 2022-08-11 RX ORDER — OXYTOCIN/RINGER'S LACTATE 30/500 ML
95 PLASTIC BAG, INJECTION (ML) INTRAVENOUS ONCE
Status: COMPLETED | OUTPATIENT
Start: 2022-08-11 | End: 2022-08-11

## 2022-08-11 RX ORDER — ACETAMINOPHEN 325 MG/1
650 TABLET ORAL EVERY 6 HOURS PRN
Status: DISCONTINUED | OUTPATIENT
Start: 2022-08-11 | End: 2022-08-12

## 2022-08-11 RX ORDER — CALCIUM CARBONATE 200(500)MG
500 TABLET,CHEWABLE ORAL 3 TIMES DAILY PRN
Status: DISCONTINUED | OUTPATIENT
Start: 2022-08-11 | End: 2022-08-12

## 2022-08-11 RX ORDER — MUPIROCIN 20 MG/G
OINTMENT TOPICAL
Status: DISCONTINUED | OUTPATIENT
Start: 2022-08-11 | End: 2022-08-12

## 2022-08-11 RX ORDER — MISOPROSTOL 100 MCG
25 TABLET ORAL EVERY 4 HOURS PRN
Status: DISCONTINUED | OUTPATIENT
Start: 2022-08-11 | End: 2022-08-12

## 2022-08-11 RX ORDER — BUTORPHANOL TARTRATE 2 MG/ML
1 INJECTION INTRAMUSCULAR; INTRAVENOUS
Status: DISCONTINUED | OUTPATIENT
Start: 2022-08-11 | End: 2022-08-12

## 2022-08-11 RX ORDER — SODIUM CHLORIDE 9 MG/ML
INJECTION, SOLUTION INTRAVENOUS
Status: DISCONTINUED | OUTPATIENT
Start: 2022-08-11 | End: 2022-08-12

## 2022-08-11 RX ORDER — LIDOCAINE HYDROCHLORIDE 10 MG/ML
10 INJECTION INFILTRATION; PERINEURAL ONCE AS NEEDED
Status: DISCONTINUED | OUTPATIENT
Start: 2022-08-11 | End: 2022-08-12

## 2022-08-11 RX ORDER — FENTANYL/ROPIVACAINE/NS/PF 2MCG/ML-.2
PLASTIC BAG, INJECTION (ML) INJECTION
Status: DISCONTINUED
Start: 2022-08-11 | End: 2022-08-12 | Stop reason: WASHOUT

## 2022-08-11 RX ORDER — OXYTOCIN/RINGER'S LACTATE 30/500 ML
334 PLASTIC BAG, INJECTION (ML) INTRAVENOUS ONCE
Status: COMPLETED | OUTPATIENT
Start: 2022-08-11 | End: 2022-08-11

## 2022-08-11 RX ADMIN — Medication 334 MILLI-UNITS/MIN: at 11:08

## 2022-08-11 RX ADMIN — ONDANSETRON 8 MG: 2 INJECTION, SOLUTION INTRAMUSCULAR; INTRAVENOUS at 10:08

## 2022-08-11 RX ADMIN — Medication 2 MILLI-UNITS/MIN: at 12:08

## 2022-08-11 RX ADMIN — LIDOCAINE HYDROCHLORIDE 30 MG: 10 INJECTION, SOLUTION EPIDURAL; INFILTRATION; INTRACAUDAL; PERINEURAL at 05:08

## 2022-08-11 RX ADMIN — CALCIUM CARBONATE (ANTACID) CHEW TAB 500 MG 500 MG: 500 CHEW TAB at 09:08

## 2022-08-11 RX ADMIN — SODIUM CHLORIDE, SODIUM LACTATE, POTASSIUM CHLORIDE, AND CALCIUM CHLORIDE 500 ML: .6; .31; .03; .02 INJECTION, SOLUTION INTRAVENOUS at 12:08

## 2022-08-11 RX ADMIN — Medication 95 MILLI-UNITS/MIN: at 11:08

## 2022-08-11 RX ADMIN — FENTANYL CIT 0.2 MG/100ML-ROPIV 0.2%-NACL 0.9% EPIDURAL INJ 10 ML/HR: 2/0.2 SOLUTION at 05:08

## 2022-08-11 NOTE — NURSING
1645- Patient requesting epidural, Dr Hadley made aware. Time out performed at bedside with Dr Hadley at 1709, epidural procedure completed at 1712. Patient reports increased comfort. Maciel catheter placed at 1730, draining clear urine. Dr Gomes at bedside at 1745, SVE performed. Patient is 6cm/70%/-2 per MD. AROM and internal monitor placement discussed with patient and family, declines for now. Difficulty monitoring infant with EFM, patient requesting internals. Dr Gomes notified, internal monitors placed at 1800.

## 2022-08-11 NOTE — ANESTHESIA PREPROCEDURE EVALUATION
2022  Peyton Qiu is a 26 y.o., female  w/ hx of HUSSAIN, received IV iron infusion during pregnancy. Admitted for L&D, requesting labor epidural.       Patient Active Problem List   Diagnosis    PCOS (polycystic ovarian syndrome)    Uterine leiomyoma    Class 3 severe obesity with body mass index (BMI) of 50.0 to 59.9 in adult    SI (sacroiliac) pain    Weakness    Myalgia    Obesity affecting pregnancy in second trimester    Iron deficiency anemia during pregnancy       Review of patient's allergies indicates:   Allergen Reactions    Augmentin [amoxicillin-pot clavulanate] Rash    Betamethasone, augmented Diarrhea, Hives, Nausea And Vomiting and Rash        No current facility-administered medications on file prior to encounter.     Current Outpatient Medications on File Prior to Encounter   Medication Sig Dispense Refill    ferrous sulfate 325 (65 FE) MG EC tablet Take 1 tablet (325 mg total) by mouth every other day. 60 tablet 3    prenatal vit no.124/iron/folic (PRENATAL VITAMIN ORAL) Take by mouth.      terconazole (TERAZOL 7) 0.4 % Crea Place 1 applicator vaginally once daily. (Patient not taking: No sig reported) 45 g 0       Past Surgical History:   Procedure Laterality Date    WISDOM TOOTH EXTRACTION         Social History     Socioeconomic History    Marital status: Single   Tobacco Use    Smoking status: Never Smoker    Smokeless tobacco: Never Used   Substance and Sexual Activity    Alcohol use: No    Drug use: Never    Sexual activity: Yes     Partners: Male         Vital Signs Range (Last 24H):  Pulse:  []   BP: (118-119)/(58-72)   SpO2:  [99 %-100 %]       CBC:   Recent Labs     22  1141   WBC 9.19   RBC 4.14   HGB 9.8*   HCT 32.1*      MCV 78*   MCH 23.7*   MCHC 30.5*       CMP:   Recent Labs     22  1141   *   K 3.7       CO2 19*   BUN 7   CREATININE 0.5   GLU 74   CALCIUM 8.6*   ALBUMIN 2.5*   PROT 6.8   ALKPHOS 95   ALT 9*   AST 8*   BILITOT 0.2       INR  No results for input(s): PT, INR, PROTIME, APTT in the last 72 hours.      Pre-op Assessment    I have reviewed the Patient Summary Reports.     I have reviewed the Nursing Notes.       Review of Systems  Anesthesia Hx:  No problems with previous Anesthesia    Hematology/Oncology:         -- Anemia:   EENT/Dental:EENT/Dental Normal   Cardiovascular:  Cardiovascular Normal     Pulmonary:  Pulmonary Normal    Renal/:  Renal/ Normal     Hepatic/GI:  Hepatic/GI Normal    Neurological:  Neurology Normal    Endocrine:  Morbid Obesity / BMI > 40      Physical Exam  General: Well nourished, Cooperative, Alert and Oriented    Airway:  Mallampati: II   Mouth Opening: Normal  TM Distance: Normal  Tongue: Normal  Neck ROM: Normal ROM    Dental:  Intact        Anesthesia Plan  Type of Anesthesia, risks & benefits discussed:    Anesthesia Type: Gen ETT, Epidural, Spinal, CSE, MAC  Intra-op Monitoring Plan: Standard ASA Monitors  Post Op Pain Control Plan: multimodal analgesia  Informed Consent: Informed consent signed with the Patient and all parties understand the risks and agree with anesthesia plan.  All questions answered.   ASA Score: 2  Day of Surgery Review of History & Physical: H&P Update referred to the surgeon/provider.    Ready For Surgery From Anesthesia Perspective.     .

## 2022-08-11 NOTE — NURSING
Dr Gomes at bedside for patient evaluation. SVE per MD 4cm dilated. No CRB at this time, orders to keep titrating pitocin. Patient reports feeling comfortable, will eventually want epidural. Encouraged patient to call when she wants her epidural.

## 2022-08-11 NOTE — NURSING
Informed Dr Gomes of patient arrival and vaginal exam. Per MD, will insert CRB and orders to start patient on pitocin.

## 2022-08-11 NOTE — NURSING
Patient arrived to unit at 1037 for scheduled elective induction of labor. Labor room being cleaned, patient placed in waiting room in meantime. Armbands placed on patient and covid swab collected, patient placed in clean LDR at 1054. Instructed on hibiclens shower and instructed to call nurse's station when shower complete.

## 2022-08-11 NOTE — PROGRESS NOTES
Patient doing well s/p epidural    Pulse:  []   BP: (118-133)/(58-72)   SpO2:  [99 %-100 %]     In bed, NAD  Cervix: 6/70/-2  AROM clear fluid     FHT: 120/mod letty/+Accel -decel  [place]: Q2 minutes,     A/P:     26  at 39 3/7 wga   1. Elective IOL: AROM clear fluid at 1800, IUPC and FSE placed   2. Obesity: SCD when in bed   3. 5 cm posterior fibroid

## 2022-08-11 NOTE — DISCHARGE INSTRUCTIONS
"Patient Discharge Instructions for Postpartum Women    Resume Regular Diet  Increase activity gradually, no heavy lifting  Shower  No tampons, douching or sexual intercourse.  Discuss birth control options with your physician.  Wear a support bra  Return to work/school when you've been cleared by a physician    Call your physician if     *Fever of 100.4 or higher  *Persistent nausea/ vomiting  *Incisional drainage  *Heavy vaginal bleeding or large clots (Heavy bleeding is soaking 1 pad in an hour)  *Swelling and pain in arms or legs  *Severe headaches, blurred vision or fainting  *Shortness of breath  *Frequency and burning with urination  *Signs of postpartum depression, discuss these signs with your physician    Call lactation services for questions regarding feeding, nipple and breast care, and general questions about lactation.  They can be reached at [phone]         Understanding Postpartum Depression    You've just had a baby.  You know you should be excited and happy.  But instead you find yourself crying for no reason.  You may have trouble coping with your daily tasks.  You feel sad, tired, and hopeless most of the time.  You may even feel ashamed or guilty.  But what you're going through is not your fault and you can feel better.  Talk to your doctor.  He or she can help.    Depression After Childbirth    You may be weepy and tired right after giving birth.  These feelings are normal.  They're sometimes called the "baby blues."  These blues go away 2-3 weeks.  However, postpartum (meaning "after birth") depression lasts much longer and is more sever than the "baby blues."  It can make you feel sad and hopeless.  You may also fear that your baby will be harmed and worry about being a bad mother.      What is Depression?    Depression is a mood disorder that affects the way you think and feel.  The most common symptom is a feeling of deep sadness.  You may also feel as if you just can't cope with life.  "   Other symptoms include:      * Gaining or loosing weight  * Sleeping too much or too little  * Feeling tired all the time  * Feeling restless  * Fears of harming your baby   * Lack of interest in your baby  * Feeling worthless or guilty  * No longer finding pleasure in things you used to  * Having trouble thinking clearly or making decisions  * Thoughts of hurting yourself or your baby    What Causes Postpartum Depression    The exact causes of postpartum depression isn't known.  It may be due to changes in your hormones during and after childbirth.  You may also be tired from caring for your baby and adjusting to being a mother.  All these factors may make you feel depressed.  In some cases, your genes may also play a role.    Depression Can Be Treated    The good news is that there are many ways to treat postpartum depression.  Talking to your doctor is the first step toward feeling better.    Resources:    * National [place] of Mental Health  -- 557.526.6325    www.nimh.nih.gov    * National [place] on Mental Illness --942.503.1777    Www.wally.org    * Mental Health [place] -- 958.839.3493     Www.[facility].org    * National Suicide Hotline --937.923.7866 (800-SUICIDE)    9242-0216 [employer]  All rights reserved.  This information is not intended as a substitute for professional medical care.  Always follow up with your healthcare professional's instructions.   Circumcision Care        How can I take care of my son?    Remove the dressing (which is gauze with A&D ointment), and reapply with each diaper change for the first 24 hours. Warm compresses may be used to remove the dressing if needed. After 24 hours you may gently cleanse the area with water 2 times a day or whenever it becomes soiled. Soap is usually unnecessary. A small amount of A&D ointment should be applied to the incision line once a day to keep it soft during healing and prevent pain.    When should I call my son's healthcare  provider?    Call IMMEDIATELY if your child has been circumcised recently and:    *The Urine comes out in dribbles  *The head of the penis turns blue or black  *The incision line bleeds more than a few drops  * The circumcision looks infected  * Your baby develops a fever  * Your baby is acting sick         Breastfeeding Discharge Instructions      Feed the baby at the earliest sign of hunger or comfort  Hands to mouth, sucking motions  Rooting or searching for something to suck on  Dont wait for crying - it is a sign of distress    The feedings may be 8-12 times per 24hrs and will not follow a schedule  Avoid pacifiers and bottles for the first 4 weeks  Alternate the breast you start the feeding with, or start with the breast that feels the fullest  Switch breasts when the baby takes himself off the breast or falls asleep  Keep offering breasts until the baby looks full, no longer gives hunger signs, and stays asleep when placed on his back in the crib  If the baby is sleepy and wont wake for a feeding, put the baby skin-to-skin dressed in a diaper against the mothers bare chest  Sleep near your baby  The baby should be positioned and latched on to the breast correctly  Chest-to-chest, chin in the breast  Babys lips are flipped outward  Babys mouth is stretched open wide like a shout  Babys sucking should feel like tugging to the mother  The baby should be drinking at the breast:  You should hear swallowing or gulping throughout the feeding  You should see milk on the babys lips when he comes off the breast  Your breasts should be softer when the baby is finished feeding  The baby should look relaxed at the end of feedings  After the 4th day and your milk is in:  The babys poop should turn bright yellow and be loose, watery, and seedy  The baby should have at least 3-4 poops the size of the palm of your hand per day  The baby should have at least 5-6 wet diapers per day  The urine should be light yellow  in color  You should drink when you are thirsty and eat a healthy diet when you are    hungry.     Take naps to get the rest you need.   Take medications and/or drink alcohol only with permission of your obstetrician    or the babys pediatrician.  You can also call the [facility] Risk [facility],   ([phone]), Monday-Friday, 8am-5pm Central time, to get the most   up-to-date evidence-based information on the use of medications during   pregnancy and breastfeeding.      The baby should be examined by a pediatrician at 3-5 days of age.  Once your   milk comes in, the baby should be gaining at least ½ - 1oz each day and should be back to birthweight no later than 10-14 days of age.          Community Resources    [facility] Breastfeeding Warmline: 574.354.9803  Local [facility] clinics: provide incentives and breastpumps to eligible mothers  [employer] Leche [employer] (LLLI):  mother-to-mother support group website        www.WeddingLovely.Magnetic  Local La Leche League mother-to-mother support groups:        www.Metatomix        La Leche League [facility]   Dr. Oumar Silva website for latch videos and general information:        www.breastfeedinginc.ca  [facility] Risk [facility] is a call center that provides information about the safety of taking medications while breastfeeding.  Call [phone], M-F, 8am-5pm, CT.  International Lactation Consultant Association provides resources for assistance:        www.ilca.org  [facility] Breastfeeding Coalition provides informationand resources for parents  and the community    http://[facility].org  Zip code search of breastfeeding resources and more:                                                                              www.[place]BreastfeedingSupport.org          Dawna Flood is a mom-to-mom support group:                             www.nolanAnna Lozabai.CreativeD//breastfeedng-support/  Partners for Healthy Babies:  5-545-509-BABY(5897)  Cafe [employer]ait: a  breastfeeding support group for women of color, 902.972.6591

## 2022-08-11 NOTE — ANESTHESIA PROCEDURE NOTES
CSE    Patient location during procedure: OB  Start time: 8/11/2022 5:30 PM  Timeout: 8/11/2022 5:30 PM  End time: 8/11/2022 5:35 PM    Reason for block: at surgeon's request and labor analgesia requested by patient and obstetrician    Staffing  Authorizing Provider: Corinne C. Weinstein, MD  Performing Provider: Corinne C. Weinstein, MD    Preanesthetic Checklist  Completed: patient identified, IV checked, site marked, risks and benefits discussed, surgical consent, monitors and equipment checked, pre-op evaluation and timeout performed  CSE  Patient position: sitting  Prep: ChloraPrep  Patient monitoring: heart rate, continuous pulse ox and frequent blood pressure checks  Approach: midline  Spinal Needle  Needle type: pencil-tip   Needle gauge: 25 G  Needle length: 5 in  Epidural Needle  Injection technique: PETEY saline  Needle type: Tuohy   Needle gauge: 17 G  Needle length: 3.5 in  Needle insertion depth: 8 cm  Location: L3-4  Needle localization: anatomical landmarks   Catheter  Catheter type: springwound  Catheter at skin depth: 13 cm  Test dose: lidocaine 1.5% with Epi 1-to-200,000  Test dose: 3 mL  Additional Documentation: incremental injection, negative aspiration for CSF, negative aspiration for heme and no paresthesia on injection  Additional Notes  No complications or paresthesias, fhts stable. Easy block x1 pass. Patient tolerated procedure well  Medications:    Medications: lidocaine (PF) injection 1% - Other   30 mg - 8/11/2022 5:30:00 PM

## 2022-08-11 NOTE — H&P
CC: induction of labor    HPI:   Peyton Qiu is a 26 y.o. female  at 39 3/7 EGA who presents here for IOL. She reports started having worsening contractions this am.     ROS:  GENERAL: Denies weight gain or weight loss. Feeling well overall.   SKIN: Denies rash or lesions.   HEAD: Denies head injury or headache.   CHEST: Denies chest pain or shortness of breath.   CARDIOVASCULAR: Denies palpitations or left sided chest pain.   ABDOMEN: No abdominal pain, constipation, diarrhea, nausea, vomiting or rectal bleeding.   URINARY: No frequency, dysuria, hematuria, or burning on urination.  REPRODUCTIVE: See HPI.     Past Medical History:   Diagnosis Date    Eczema     Seasonal allergic rhinitis     Vertigo      Past Surgical History:   Procedure Laterality Date    WISDOM TOOTH EXTRACTION       Family History   Problem Relation Age of Onset    Coronary artery disease Father     Diabetes Maternal Grandmother     Heart disease Maternal Grandmother     No Known Problems Mother      Allergies: Augmentin [amoxicillin-pot clavulanate] and Betamethasone, augmented  Social History     Socioeconomic History    Marital status: Single   Tobacco Use    Smoking status: Never Smoker    Smokeless tobacco: Never Used   Substance and Sexual Activity    Alcohol use: No    Drug use: Never    Sexual activity: Yes     Partners: Male     Meds: pnv       PE:   Pulse:  []   BP: (118-119)/(58-72)   SpO2:  [99 %-100 %]     APPEARANCE: Well nourished, well developed, in no acute distress.  CHEST: Lungs clear to auscultation.  HEART: Regular rate and rhythm, no murmurs, rubs or gallops.  ABDOMEN:  Gravid.   SVE: 4/60/-3, vtx, on 2 pitocin   /mod letty/+accel -decel  [place]: irregular     Assessment:  26  at 39 3/7 wga   1. Elective IOL: will do pitocin   2. Obesity: SCD when in bed   3. 5 cm posterior fibroid

## 2022-08-12 LAB
BASOPHILS # BLD AUTO: 0.03 K/UL (ref 0–0.2)
BASOPHILS NFR BLD: 0.3 % (ref 0–1.9)
DIFFERENTIAL METHOD: ABNORMAL
EOSINOPHIL # BLD AUTO: 0.1 K/UL (ref 0–0.5)
EOSINOPHIL NFR BLD: 0.7 % (ref 0–8)
ERYTHROCYTE [DISTWIDTH] IN BLOOD BY AUTOMATED COUNT: 22.7 % (ref 11.5–14.5)
HCT VFR BLD AUTO: 30.7 % (ref 37–48.5)
HGB BLD-MCNC: 9.4 G/DL (ref 12–16)
IMM GRANULOCYTES # BLD AUTO: 0.1 K/UL (ref 0–0.04)
IMM GRANULOCYTES NFR BLD AUTO: 0.9 % (ref 0–0.5)
LYMPHOCYTES # BLD AUTO: 1.6 K/UL (ref 1–4.8)
LYMPHOCYTES NFR BLD: 14 % (ref 18–48)
MCH RBC QN AUTO: 23.5 PG (ref 27–31)
MCHC RBC AUTO-ENTMCNC: 30.6 G/DL (ref 32–36)
MCV RBC AUTO: 77 FL (ref 82–98)
MONOCYTES # BLD AUTO: 0.7 K/UL (ref 0.3–1)
MONOCYTES NFR BLD: 6.3 % (ref 4–15)
NEUTROPHILS # BLD AUTO: 8.7 K/UL (ref 1.8–7.7)
NEUTROPHILS NFR BLD: 77.8 % (ref 38–73)
NRBC BLD-RTO: 0 /100 WBC
PLATELET # BLD AUTO: 359 K/UL (ref 150–450)
PMV BLD AUTO: 8.4 FL (ref 9.2–12.9)
RBC # BLD AUTO: 4 M/UL (ref 4–5.4)
WBC # BLD AUTO: 11.19 K/UL (ref 3.9–12.7)

## 2022-08-12 PROCEDURE — 59400 PR FULL ROUT OBSTE CARE,VAGINAL DELIV: ICD-10-PCS | Mod: GB,,, | Performed by: OBSTETRICS & GYNECOLOGY

## 2022-08-12 PROCEDURE — 51702 INSERT TEMP BLADDER CATH: CPT

## 2022-08-12 PROCEDURE — 59400 OBSTETRICAL CARE: CPT | Mod: GB,,, | Performed by: OBSTETRICS & GYNECOLOGY

## 2022-08-12 PROCEDURE — 85025 COMPLETE CBC W/AUTO DIFF WBC: CPT | Performed by: OBSTETRICS & GYNECOLOGY

## 2022-08-12 PROCEDURE — 25000003 PHARM REV CODE 250: Performed by: OBSTETRICS & GYNECOLOGY

## 2022-08-12 PROCEDURE — 72200006 HC VAGINAL DELIVERY LEVEL III

## 2022-08-12 PROCEDURE — 36415 COLL VENOUS BLD VENIPUNCTURE: CPT | Performed by: OBSTETRICS & GYNECOLOGY

## 2022-08-12 PROCEDURE — 11000001 HC ACUTE MED/SURG PRIVATE ROOM

## 2022-08-12 RX ORDER — DIPHENHYDRAMINE HYDROCHLORIDE 50 MG/ML
25 INJECTION INTRAMUSCULAR; INTRAVENOUS EVERY 4 HOURS PRN
Status: DISCONTINUED | OUTPATIENT
Start: 2022-08-12 | End: 2022-08-13 | Stop reason: HOSPADM

## 2022-08-12 RX ORDER — SODIUM CHLORIDE 9 MG/ML
INJECTION, SOLUTION INTRAVENOUS CONTINUOUS
Status: DISCONTINUED | OUTPATIENT
Start: 2022-08-12 | End: 2022-08-12

## 2022-08-12 RX ORDER — OXYCODONE AND ACETAMINOPHEN 5; 325 MG/1; MG/1
1 TABLET ORAL EVERY 4 HOURS PRN
Status: DISCONTINUED | OUTPATIENT
Start: 2022-08-12 | End: 2022-08-13 | Stop reason: HOSPADM

## 2022-08-12 RX ORDER — IBUPROFEN 600 MG/1
600 TABLET ORAL EVERY 6 HOURS PRN
Status: DISCONTINUED | OUTPATIENT
Start: 2022-08-12 | End: 2022-08-13 | Stop reason: HOSPADM

## 2022-08-12 RX ORDER — DIPHENHYDRAMINE HCL 25 MG
25 CAPSULE ORAL EVERY 4 HOURS PRN
Status: DISCONTINUED | OUTPATIENT
Start: 2022-08-12 | End: 2022-08-13 | Stop reason: HOSPADM

## 2022-08-12 RX ORDER — OXYCODONE AND ACETAMINOPHEN 10; 325 MG/1; MG/1
1 TABLET ORAL EVERY 4 HOURS PRN
Status: DISCONTINUED | OUTPATIENT
Start: 2022-08-12 | End: 2022-08-13 | Stop reason: HOSPADM

## 2022-08-12 RX ORDER — IBUPROFEN 600 MG/1
600 TABLET ORAL EVERY 6 HOURS PRN
Qty: 30 TABLET | Refills: 0 | Status: SHIPPED | OUTPATIENT
Start: 2022-08-12

## 2022-08-12 RX ORDER — OXYTOCIN/RINGER'S LACTATE 30/500 ML
95 PLASTIC BAG, INJECTION (ML) INTRAVENOUS ONCE
Status: DISCONTINUED | OUTPATIENT
Start: 2022-08-12 | End: 2022-08-12

## 2022-08-12 RX ORDER — DOCUSATE SODIUM 100 MG/1
200 CAPSULE, LIQUID FILLED ORAL 2 TIMES DAILY PRN
Status: DISCONTINUED | OUTPATIENT
Start: 2022-08-12 | End: 2022-08-13 | Stop reason: HOSPADM

## 2022-08-12 RX ORDER — HYDROCORTISONE 25 MG/G
CREAM TOPICAL 3 TIMES DAILY PRN
Status: DISCONTINUED | OUTPATIENT
Start: 2022-08-12 | End: 2022-08-13 | Stop reason: HOSPADM

## 2022-08-12 RX ORDER — PROCHLORPERAZINE EDISYLATE 5 MG/ML
5 INJECTION INTRAMUSCULAR; INTRAVENOUS EVERY 6 HOURS PRN
Status: DISCONTINUED | OUTPATIENT
Start: 2022-08-12 | End: 2022-08-13 | Stop reason: HOSPADM

## 2022-08-12 RX ORDER — ACETAMINOPHEN 325 MG/1
650 TABLET ORAL EVERY 6 HOURS PRN
Status: DISCONTINUED | OUTPATIENT
Start: 2022-08-12 | End: 2022-08-13 | Stop reason: HOSPADM

## 2022-08-12 RX ORDER — ONDANSETRON 8 MG/1
8 TABLET, ORALLY DISINTEGRATING ORAL EVERY 8 HOURS PRN
Status: DISCONTINUED | OUTPATIENT
Start: 2022-08-12 | End: 2022-08-13 | Stop reason: HOSPADM

## 2022-08-12 RX ORDER — SIMETHICONE 80 MG
1 TABLET,CHEWABLE ORAL EVERY 6 HOURS PRN
Status: DISCONTINUED | OUTPATIENT
Start: 2022-08-12 | End: 2022-08-13 | Stop reason: HOSPADM

## 2022-08-12 RX ADMIN — IBUPROFEN 600 MG: 600 TABLET, FILM COATED ORAL at 06:08

## 2022-08-12 RX ADMIN — IBUPROFEN 600 MG: 600 TABLET, FILM COATED ORAL at 05:08

## 2022-08-12 RX ADMIN — ACETAMINOPHEN 650 MG: 325 TABLET ORAL at 02:08

## 2022-08-12 NOTE — PLAN OF CARE
Note copied from Infant's chart ( MRN: [id number])     SOCIAL WORK DISCHARGE PLANNING ASSESSMENT     Sw completed discharge planning assessment with pt's mother in mother's room K303 .  Pt's mother was easily engaged and education on the role of  was provided. Pt's father and maternal grandmother were at bedside during time of visit. Pt's mother reported she has obtained all necessities for patient, including a car seat. Pt's father Dre Dash will provide transportation to pt's maternal grandmother Carmen Qureshi's home following discharge. Pt's mother reported she has good family support and family will provide assistance as needed. Resource information on how to obtain a breast pump through [facility] [employer] [facility] and [employer] were provided. No other needs for community resources were reported. SW left discharge brochure and contact information. Pt's mother was encouraged to call with any questions or concerns. Pt's mother verbalized understanding.      Legal Name: Valeria Dash              :  2022  Address: [address] [place] [address]   Parent's Phone Numbers: [phone] ( Mother- Peyton Qiu)   [phone] ( Father- Tabatha Dash)      Pediatrician:  Dr. Darvin Cage             Patient Active Problem List   Diagnosis    Term  delivered vaginally, current hospitalization            [facility]irth [facility]           MARIA GUADALUPE: 8/15/2022     Birth Weight:   3.126 kg (6 lb 14.3 oz)                        Birth Length: 51 cm                 Gestational Age: 39w3d           Apgars    Living status: Living  Apgars:  1 min.:  5 min.:  10 min.:  15 min.:  20 min.:    Skin color:  0  1          Heart rate:  2  2          Reflex irritability:  2  2          Muscle tone:  2  2          Respiratory effort:  2  2          Total:  8  9                         22 1404   OB Discharge Planning  Assessment   Assessment Type Discharge Planning Assessment   Source of Information family  (Peyton Qiu [phone] ( Mother))   Verified Demographic and Insurance Information Yes   Insurance Commercial;Medicaid   Commercial BCBS OOS   Guarantor Mother   Medicaid Healthy Blue   Spiritual Affiliation [employer]   Name of Support/Comfort Primary Source Peyton Qiu [phone] ( Mother)   Father's Involvement Fully Involved   Is Father signing the birth certificate Yes   Father's Address [address]  [address]  [place] [address]   Family Involvement High   Primary Contact Name and Number Peyton Qiu [phone] ( Mother)   Other Contacts Names and Numbers Dre Dash  [phone] ( Father)    Carmen Mcfarlandg [phone] ( Maternal Grandmother)   Received Prenatal Care Yes   Transportation Anticipated family or friend will provide   Receive WIC Benefits Already certified, will apply for new born    Arrangements Self;Family;Day Care   Infant Feeding Plan formula feeding;breastfeeding   Does baby have crib or safe sleep space? Yes   Do you have a car seat? Yes   Has other essential care items? Clothing;Bottles;Diapers   Pediatrician Dr. Darvin Cage   Resources/Education Provided Breast Pumps through [employer] [place] insurance plan  ([employer])   DCFS No indications (Indicators for Report)   Discharge Plan A Home with family

## 2022-08-12 NOTE — PLAN OF CARE
Patient ambulating freely in room.  Lochia rubra &  moderate.  Fundus firm and at umbilicus. Use of tucks pads, dermoplast, and cristian bottle reviewed with patient; reports relief of perineal pain with use. Tolerating regular diet.  Voiding and passing flatus. Questions answered/encouraged; reassurance provided.  Pt states pain goal met with prescribed medications per MD.  Bonding with baby AEB holding, breastfeeding, dressing, and changing baby's diaper. Smiles appropriately at baby. Family support noted at bedside. POC reviewed with pt; able to verbalize acceptance and understanding. VS stable. Call light in reach, side rails up x2, nonskid socks on, bed in lowest position with wheels locked.  Remains free from falls and/or injury. NAD noted.  Will continue to monitor.

## 2022-08-12 NOTE — ANESTHESIA POSTPROCEDURE EVALUATION
Anesthesia Post Evaluation    Patient: Peyton Qiu    Procedure(s) Performed: * No procedures listed *    Final Anesthesia Type: CSE      Patient location during evaluation: labor & delivery  Patient participation: Yes- Able to Participate  Level of consciousness: awake and alert, awake and oriented  Post-procedure vital signs: reviewed and stable  Pain management: adequate  Airway patency: patent    PONV status at discharge: No PONV  Anesthetic complications: no      Cardiovascular status: blood pressure returned to baseline and hemodynamically stable  Respiratory status: unassisted, spontaneous ventilation and room air  Hydration status: euvolemic  Follow-up not needed.      Patient feeling well today. Denies any HA, blurry vision, N/V, fever, chills, numbness/weakness/tingling in BLE, or dizziness. Has been able to ambulate, urinate, and tolerate a PO diet. Endorses moderate amount of back pain to site of epidural placement, overall well controlled w/ PO meds. Advised pt to avoid heavy lifting for one week.      Vitals Value Taken Time   /76 08/12/22 1425   Temp 36.8 °C (98.3 °F) 08/12/22 1425   Pulse 89 08/12/22 1425   Resp 18 08/12/22 1425   SpO2 98 % 08/12/22 1425         No case tracking events are documented in the log.      Pain/Courtney Score: Pain Rating Prior to Med Admin: 3 (8/12/2022  5:30 PM)  Pain Rating Post Med Admin: 3 (8/12/2022  7:13 AM)

## 2022-08-12 NOTE — PROGRESS NOTES
2022      Patient is doing well with no complaints. Tolerating Po without N/V. Passing flatus. Ambulated without difficulty. Pain controlled with pain medications. Lochia is equal to menses. Is breastfeeding.     Temp:  [97.7 °F (36.5 °C)-97.9 °F (36.6 °C)] 97.7 °F (36.5 °C)  Pulse:  [] 80  Resp:  [16-18] 18  SpO2:  [99 %-100 %] 99 %  BP: (112-152)/(55-98) 112/55      In bed, NAD,  abd S/NT/ND  FF difficult to appreciate    A/P: 26 y.o.   s/p VAVD PPD 1   With anemia    1. Continue routine care, expect discharge tomorrow  2. Anemia: getting outpatient iron infusions   3. Obesity: SCDs when in bed

## 2022-08-12 NOTE — PLAN OF CARE
Rounded on pt. Stated that baby has been BR well. Denies need for assistance at this time. Mom plans to awaken baby for BR soon. Encouraged to call if assistance needed/for latch check. Mom will continue to exclusively breastfeed frequently & on cue at least 8+ times/24 hrs.  Will monitor for signs of deep latch & adequate fdg; I&O.  Will have baby's weight checked at ped's office in the next couple of days after d/c from hospital as recommended. Instructed to call for any questions/needs. Verbalized understanding.

## 2022-08-12 NOTE — PROGRESS NOTES
Stopped pitocin and FHT  Improved. 120/mod letty/+early declarations, no late decelerations. Will monitor closely and recheck MVU and restart pitocin if needed

## 2022-08-12 NOTE — PROGRESS NOTES
08/11/22 2301   TeleStork Bhargav Note - Strip   Strip Reviewed by Bhargav Nurse? Yes   TeleStork Bhargav Note - Communication   [facility] Nurse Communicated with Bedside Nurse Regarding: Fetal Status   TeleStork Bhargav Note - Notification   Nurse Notified?   (at BS with MD. Confirmed with staff that pt is complete and pushing.)

## 2022-08-12 NOTE — LACTATION NOTE
[facility]  Lactation Note - Mom    SUMMARY     Maternal Assessment    Breast Density: Bilateral:, soft      LATCH Score         Breasts WDL    Breast WDL: WDL (per mom;did not assess)    Maternal Infant Feeding    Maternal Preparation: breast care  Maternal Emotional State: relaxed  Pain with Feeding: no  Comfort Measures Following Feeding: air-drying encouraged  Latch Assistance: no    Lactation Referrals         Lactation Interventions    Breast Care: Breastfeeding: breast milk to nipples  Breastfeeding Assistance: feeding cue recognition promoted, feeding on demand promoted, hand expression verified, support offered  Breast Care: Breastfeeding: breast milk to nipples  Breastfeeding Assistance: feeding cue recognition promoted, feeding on demand promoted, hand expression verified, support offered  Breastfeeding Support: encouragement provided, lactation counseling provided, maternal hydration promoted, maternal nutrition promoted, maternal rest encouraged       Breastfeeding Session         Maternal Information

## 2022-08-12 NOTE — PROGRESS NOTES
Patient doing well s/p epidural    Pulse:  []   BP: (118-152)/(58-86)   SpO2:  [99 %-100 %]     In bed, NAD  Cervix: 7/70/0  AROM clear fluid     FHT: 120/mod letty/+Accel + late decelerations   [place]: Q 1-2 minutes,     A/P:     26  at 39 3/7 wga   1. Elective IOL: AROM clear fluid at 1800, IUPC and FSE on; will stop pitocin and change positions and give fluid bolus and monitor closely  2. Obesity: SCD when in bed   3. 5 cm posterior fibroid

## 2022-08-12 NOTE — L&D DELIVERY NOTE
[facility]  Vaginal Delivery   Operative Note    SUMMARY     Called to patients room reporting that patient was 9.5 cm and fetal heart tones were low. When I arrived to the room fetal heart tones were in the 80s for 3 minutes. Patient's positions was turned and rotated and no improvement in heart tones. Checked and she was completely dilated and +1 station. Mom pushed and fetal head came to +2. FHT recovered briefly to 120 then began to have another deceleration. Discussed with her that I would recommend operative delivery vs  at this point. She agreed to a vacuum delivery. Kiwi vacuum was placed on the flexion point and pressure increased to the green. A pull was done with the next push and fetal head descended slightly with a pop off of the vacuum. The vacuum was replaced and another pop off occurred. It was placed once more and then head descended and was born with one more pull.   Vacuum assisted vaginal delivery of live male infant, skin to skin was unable to be performed due to nursery need to evaluate the baby.    Infant delivered position OA over intact perineum.  Nuchal cord: Yes, cord reduced at perineum.    Spontaneous delivery of placenta and IV pitocin given noting good uterine tone.  2nd degree laceration noted and repaired in normal fashion with 2-0 chromic on CT. And a 3-0 chromic on SH was use to repair a small side wall laceration.   Patient tolerated delivery well. Sponge needle and lap counted correctly x2. EBL: 250     Indications: <principal problem not specified>  Pregnancy complicated by:   Patient Active Problem List   Diagnosis    PCOS (polycystic ovarian syndrome)    Uterine leiomyoma    Class 3 severe obesity with body mass index (BMI) of 50.0 to 59.9 in adult    SI (sacroiliac) pain    Weakness    Myalgia    Obesity affecting pregnancy in second trimester    Iron deficiency anemia during pregnancy     Admitting GA: 39w3d    Delivery Information for Boy  Peyton Qiu    Birth information:  YOB: 2022   Time of birth: 11:05 PM   Sex: male   Head Delivery Date/Time: 2022 11:05 PM   Delivery type: Vaginal, Vacuum (Extractor)   Gestational Age: 39w3d    Delivery Providers    Delivering clinician: Naseem Gomes MD   Provider Role    Clementina Dahl MD             Measurements    Weight:   Length:          Apgars    Living status: Living  Apgars:  1 min.:  5 min.:  10 min.:  15 min.:  20 min.:    Skin color:         Heart rate:         Reflex irritability:         Muscle tone:         Respiratory effort:         Total:                Operative Delivery    Forceps attempted?: No  Vacuum extractor attempted?: Yes  Vacuum indications: Fetal Heart Rate or Rhythm Abnormality  Vacuum type: Kiwi  First attempt time vacuum applied: 2022 23:02:00  Number of pop offs: 1  Number of pulls with vacuum: 2  Vacuum applied by: NASEEM GOMES  Failed vacuum delivery?: No         Shoulder Dystocia    Shoulder dystocia present?: No           Presentation    Presentation: Vertex  Position: Occiput Anterior           Interventions/Resuscitation    Method: Bulb Suctioning, Tactile Stimulation       Cord    Vessels: 3 vessels  Complications: None  Delayed Cord Clamping?: Yes  Cord Clamped Date/Time: 2022 11:08 PM  Cord Blood Disposition: Sent with Baby  Stem Cell Collection (by MD): No       Placenta    Placenta delivery date/time: 2022 2310  Placenta removal: Spontaneous  Placenta appearance: Intact  Placenta disposition: discarded           Labor Events:       labor: No     Labor Onset Date/Time:         Dilation Complete Date/Time: 2022 22:45     Start Pushing Date/Time: 2022 22:55       Start Pushing Date/Time: 2022 22:55     Rupture Date/Time: 22  1745         Rupture type:          Fluid Amount:       Fluid Color: Clear      Fluid Odor:       Membrane Status: ARM (Artificial Rupture)               steroids:        Antibiotics given for GBS: No     Induction: oxytocin     Indications for induction:        Augmentation:       Indications for augmentation:       Labor complications: Fetal Intolerance     Additional complications:          Cervical ripening:                     Delivery:      Episiotomy: None     Indication for Episiotomy:       Perineal Lacerations:   Repaired:      Periurethral Laceration:   Repaired:     Labial Laceration:   Repaired:     Sulcus Laceration:   Repaired:     Vaginal Laceration:   Repaired:     Cervical Laceration:   Repaired:     Repair suture:       Repair # of packets:       Last Value - EBL - Nursing (mL):       Sum - EBL - Nursing (mL): 0     Last Value - EBL - Anesthesia (mL):      Calculated QBL (mL):       Vaginal Sweep Performed: Yes     Surgicount Correct: Yes       Other providers:       Anesthesia    Method: Epidural          Details (if applicable):  Trial of Labor      Categorization:      Priority:     Indications for :     Incision Type:       Additional  information:  Forceps:    Vacuum:    Breech:    Observed anomalies    Other (Comments):

## 2022-08-12 NOTE — PROGRESS NOTES
Late decelerations continued despite positions changes and 1/2 pitocin so will stop pitocin, reposition and give fluid bolus, , so if continues will give a dose of terbutaline.

## 2022-08-13 VITALS
TEMPERATURE: 98 F | DIASTOLIC BLOOD PRESSURE: 56 MMHG | SYSTOLIC BLOOD PRESSURE: 116 MMHG | OXYGEN SATURATION: 99 % | HEART RATE: 76 BPM | RESPIRATION RATE: 16 BRPM

## 2022-08-13 PROCEDURE — 25000003 PHARM REV CODE 250: Performed by: OBSTETRICS & GYNECOLOGY

## 2022-08-13 RX ADMIN — OXYCODONE HYDROCHLORIDE AND ACETAMINOPHEN 1 TABLET: 5; 325 TABLET ORAL at 05:08

## 2022-08-13 RX ADMIN — IBUPROFEN 600 MG: 600 TABLET, FILM COATED ORAL at 05:08

## 2022-08-13 RX ADMIN — OXYCODONE HYDROCHLORIDE AND ACETAMINOPHEN 1 TABLET: 5; 325 TABLET ORAL at 10:08

## 2022-08-13 NOTE — PLAN OF CARE
Pt received discharge instructions.  Prescriptions previously received OP meds from pharmacy.  Questions answered/encouraged.  Pt verbalized understanding.      1553: Pt leaving unit for DC independently. Infant carried out by father in car seat. NAD noted.

## 2022-08-13 NOTE — PLAN OF CARE
Rounded on pt. Mom stated that baby BR well last at 0930. Denies need for assistance prior to d/c today. D/c teaching done. Mom stated that she has been BR & FF. Plans to continue to do both at home as well. Discussed benefits of BR/possible risks of FF; size of baby's stomach; adequacy of colostrum; supply/demand. Encouraged more BR & to do so first; discouraged FF if BR effectively. AAP recommendations discussed. Mom will breastfeed frequently & on cue at least 8+ times/24 hrs.  Will monitor for signs of deep latch & adequate fdg; I&O.  Will have baby's weight checked at ped's office in the next couple of days after d/c from hospital as recommended. Discussed available resources in Breastfeeding Guide. Instructed to call for any questions/needs. Verbalized understanding.

## 2022-08-13 NOTE — LACTATION NOTE
[facility]  Lactation Note - Mom    SUMMARY     Maternal Assessment    Breast Density: Bilateral:, soft      LATCH Score         Breasts WDL    Breast WDL: WDL (per mom;did not assess)    Maternal Infant Feeding    Maternal Preparation: breast care, hand hygiene  Maternal Emotional State: relaxed  Pain with Feeding: no  Comfort Measures Following Feeding: air-drying encouraged  Latch Assistance: no    Lactation Referrals    Community Referrals: outpatient lactation program, pediatric care provider, support group, WIC (women, infants and children) program  Outpatient Lactation Program Lactation Follow-up Date/Time: enc to call warmline w/candida serranon  Pediatric Care Provider Lactation Follow-up Date/Time: within 2-3 days;plans to sched appt w/Dr Cage  Support Group Lactation Follow-up Date/Time: rev'd resources in BR Guide  WIC Lactation Follow-up Date/Time: rev'd resources in BR Guide    Lactation Interventions    Breast Care: Breastfeeding: breast milk to nipples  Breastfeeding Assistance: feeding cue recognition promoted, feeding on demand promoted, support offered, hand expression verified  Breast Care: Breastfeeding: breast milk to nipples  Breastfeeding Assistance: feeding cue recognition promoted, feeding on demand promoted, support offered, hand expression verified  Breastfeeding Support: diary/feeding log utilized, encouragement provided, lactation counseling provided, maternal hydration promoted, maternal nutrition promoted, maternal rest encouraged       Breastfeeding Session         Maternal Information

## 2022-08-13 NOTE — DISCHARGE SUMMARY
[facility]  Obstetrics  Discharge Summary      Patient Name: Peyton Qiu  MRN: [id number]  Admission Date: 2022  Hospital Length of Stay: 2 days  Discharge Date and Time:  2022 10:44 AM  Attending Physician: Maki Gomes MD   Discharging Provider: Fran Taylor MD  Primary Care Provider: Monica Baumann MD    HPI: 25 yo now  female admitted at 39 weeks gestation admitted for induction of labor.    Hospital Course: The patient's labor course was uncomplicated. She is now s/p uncomplicated normal spontaneous vaginal delivery. Her postpartum course was also uncomplicated. On day of discharge, she was tolerating PO. Her pain was well controlled. She was ambulating and voiding spontaneously.     Final Active Diagnoses:    Diagnosis Date Noted POA    PRINCIPAL PROBLEM:  Vacuum-assisted vaginal delivery [Z37.9] 2022 No    39 weeks gestation of pregnancy [Z3A.39]  Not Applicable      Problems Resolved During this Admission:        Lab Results   Component Value Date    WBC 11.19 2022    HGB 9.4 (L) 2022    HCT 30.7 (L) 2022    MCV 77 (L) 2022     2022       A POS      Feeding Method: both breast and bottle    Immunizations     Date Immunization Status Dose Route/Site Given by    22 0129 MMR Incomplete 0.5 mL Subcutaneous/           Delivery:    Episiotomy: None   Lacerations: 2nd   Repair suture:     Repair # of packets: 2   Blood loss (ml):       Birth information:  YOB: 2022   Time of birth: 11:05 PM   Sex: male   Delivery type: Vaginal, Vacuum (Extractor)   Gestational Age: 39w3d    Delivery Clinician:      Other providers:       Additional  information:  Forceps:    Vacuum:    Breech:    Observed anomalies      Living?:           APGARS  One minute Five minutes Ten minutes   Skin color:         Heart rate:         Grimace:         Muscle tone:         Breathing:         Totals: 8  9        Placenta:  Delivered:       appearance    Pending Diagnostic Studies:     None          Discharged Condition: good    Disposition: Home or Self Care    Follow Up:   Follow-up Information     Maki Gomes MD Follow up in 6 week(s).    Specialty: Obstetrics and Gynecology  Contact information:  [address] [place] [address] [phone]                       Patient Instructions:   No discharge procedures on file.  Medications:  Current Discharge Medication List      START taking these medications    Details   ibuprofen (ADVIL,MOTRIN) 600 MG tablet Take 1 tablet (600 mg total) by mouth every 6 (six) hours as needed (cramping).  Qty: 30 tablet, Refills: 0         CONTINUE these medications which have NOT CHANGED    Details   ferrous sulfate 325 (65 FE) MG EC tablet Take 1 tablet (325 mg total) by mouth every other day.  Qty: 60 tablet, Refills: 3      prenatal vit no.124/iron/folic (PRENATAL VITAMIN ORAL) Take by mouth.         STOP taking these medications       terconazole (TERAZOL 7) 0.4 % Crea Comments:   Reason for Stopping:               Fran Taylor MD  Obstetrics  [facility]

## 2022-08-15 ENCOUNTER — INFUSION (OUTPATIENT)
Dept: INFUSION THERAPY | Facility: HOSPITAL | Age: 27
End: 2022-08-15
Attending: INTERNAL MEDICINE
Payer: COMMERCIAL

## 2022-08-15 VITALS
RESPIRATION RATE: 18 BRPM | HEART RATE: 100 BPM | OXYGEN SATURATION: 98 % | HEIGHT: 63 IN | DIASTOLIC BLOOD PRESSURE: 64 MMHG | SYSTOLIC BLOOD PRESSURE: 130 MMHG | BODY MASS INDEX: 51.91 KG/M2 | WEIGHT: 293 LBS | TEMPERATURE: 99 F

## 2022-08-15 DIAGNOSIS — D50.9 IRON DEFICIENCY ANEMIA DURING PREGNANCY: Primary | ICD-10-CM

## 2022-08-15 DIAGNOSIS — O99.019 IRON DEFICIENCY ANEMIA DURING PREGNANCY: Primary | ICD-10-CM

## 2022-08-15 PROCEDURE — 63600175 PHARM REV CODE 636 W HCPCS: Performed by: INTERNAL MEDICINE

## 2022-08-15 PROCEDURE — A4216 STERILE WATER/SALINE, 10 ML: HCPCS | Performed by: INTERNAL MEDICINE

## 2022-08-15 PROCEDURE — 96365 THER/PROPH/DIAG IV INF INIT: CPT

## 2022-08-15 PROCEDURE — 25000003 PHARM REV CODE 250: Performed by: INTERNAL MEDICINE

## 2022-08-15 RX ORDER — SODIUM CHLORIDE 0.9 % (FLUSH) 0.9 %
10 SYRINGE (ML) INJECTION
Status: DISCONTINUED | OUTPATIENT
Start: 2022-08-15 | End: 2022-08-15 | Stop reason: HOSPADM

## 2022-08-15 RX ORDER — HEPARIN 100 UNIT/ML
500 SYRINGE INTRAVENOUS
Status: DISCONTINUED | OUTPATIENT
Start: 2022-08-15 | End: 2022-08-15 | Stop reason: HOSPADM

## 2022-08-15 RX ADMIN — Medication 10 ML: at 03:08

## 2022-08-15 RX ADMIN — IRON SUCROSE 200 MG: 20 INJECTION, SOLUTION INTRAVENOUS at 02:08

## 2022-08-15 RX ADMIN — SODIUM CHLORIDE: 9 INJECTION, SOLUTION INTRAVENOUS at 02:08

## 2022-08-15 NOTE — NURSING
Pt received IV Venofer  infusion dose 4/4. Pt tolerated it well. AVS given. Pt will follow up with MD. Discharged with no acute distress.

## 2022-08-17 ENCOUNTER — PATIENT MESSAGE (OUTPATIENT)
Dept: OBSTETRICS AND GYNECOLOGY | Facility: CLINIC | Age: 27
End: 2022-08-17
Payer: COMMERCIAL

## 2022-09-04 ENCOUNTER — PATIENT MESSAGE (OUTPATIENT)
Dept: OBSTETRICS AND GYNECOLOGY | Facility: CLINIC | Age: 27
End: 2022-09-04
Payer: COMMERCIAL

## 2022-09-14 ENCOUNTER — PATIENT MESSAGE (OUTPATIENT)
Dept: OBSTETRICS AND GYNECOLOGY | Facility: CLINIC | Age: 27
End: 2022-09-14
Payer: COMMERCIAL

## 2022-09-15 ENCOUNTER — POSTPARTUM VISIT (OUTPATIENT)
Dept: OBSTETRICS AND GYNECOLOGY | Facility: CLINIC | Age: 27
End: 2022-09-15
Payer: COMMERCIAL

## 2022-09-15 VITALS
BODY MASS INDEX: 49.34 KG/M2 | WEIGHT: 278.56 LBS | SYSTOLIC BLOOD PRESSURE: 128 MMHG | DIASTOLIC BLOOD PRESSURE: 94 MMHG

## 2022-09-15 PROCEDURE — 99999 PR PBB SHADOW E&M-EST. PATIENT-LVL III: ICD-10-PCS | Mod: PBBFAC,,, | Performed by: OBSTETRICS & GYNECOLOGY

## 2022-09-15 PROCEDURE — 0503F PR POSTPARTUM CARE VISIT: ICD-10-PCS | Mod: CPTII,S$GLB,, | Performed by: OBSTETRICS & GYNECOLOGY

## 2022-09-15 PROCEDURE — 0503F POSTPARTUM CARE VISIT: CPT | Mod: CPTII,S$GLB,, | Performed by: OBSTETRICS & GYNECOLOGY

## 2022-09-15 PROCEDURE — 99999 PR PBB SHADOW E&M-EST. PATIENT-LVL III: CPT | Mod: PBBFAC,,, | Performed by: OBSTETRICS & GYNECOLOGY

## 2022-09-15 RX ORDER — ACETAMINOPHEN AND CODEINE PHOSPHATE 120; 12 MG/5ML; MG/5ML
1 SOLUTION ORAL DAILY
Qty: 84 TABLET | Refills: 3 | Status: SHIPPED | OUTPATIENT
Start: 2022-09-15 | End: 2023-09-15

## 2022-09-15 NOTE — PROGRESS NOTES
CC: follow up delivery    HPI:   Peyton Qiu is a 26 y.o. here for f/u vaginal delivery. She reports normal bowel movements and urination. Pain is controlled with OTC medications. She is  breast and bottle feeding . Desires pills for contraception. Denies depression.     Vitals:    09/15/22 1004   BP: (!) 128/94       PHYSICAL EXAM:   APPEARANCE: Well nourished, well developed, in no acute distress.  ABDOMEN: Soft. No tenderness or masses. No hernias.   PELVIC:   VULVA: No lesions. Normal female genitalia.  URETHRAL MEATUS: Normal size and location, no lesions, no prolapse.  URETHRA: No masses, tenderness, prolapse or scarring.  VAGINA: Moist and well rugated, no discharge, no significant cystocele or rectocele.  CERVIX: No lesions and discharge.  UTERUS: Normal size, regular shape, mobile, non-tender, bladder base nontender.  ADNEXA: No masses or tenderness.    VAVD  Contraception     PLAN:   1. OK to resume normal activities. Contraception discussed with patient. Patient desires mini pills,discussed how to take it.

## 2022-09-27 ENCOUNTER — TELEPHONE (OUTPATIENT)
Dept: OBSTETRICS AND GYNECOLOGY | Facility: CLINIC | Age: 27
End: 2022-09-27
Payer: COMMERCIAL

## 2022-09-27 ENCOUNTER — PATIENT MESSAGE (OUTPATIENT)
Dept: OBSTETRICS AND GYNECOLOGY | Facility: CLINIC | Age: 27
End: 2022-09-27
Payer: COMMERCIAL

## 2022-09-27 DIAGNOSIS — M25.559 HIP PAIN: ICD-10-CM

## 2022-09-27 DIAGNOSIS — Z37.9 VACUUM-ASSISTED VAGINAL DELIVERY: Primary | ICD-10-CM

## 2022-09-27 DIAGNOSIS — R10.2 CHRONIC FEMALE PELVIC PAIN: ICD-10-CM

## 2022-09-27 DIAGNOSIS — G89.29 CHRONIC FEMALE PELVIC PAIN: ICD-10-CM

## 2022-09-27 NOTE — LETTER
September 27, 2022    Peyton Qiu  [address]  [place] [address]         [facility]  [address]  [place] [address]  Phone: [phone]  Fax: [phone] September 27, 2022     Patient: Peyton Qiu   YOB: 1995   Date of Visit: 9/27/2022       To Whom It May Concern:    Peyton is having residual hip pain from her delivery and is starting physical therapy. If possible, please allow her to do restricted hours at work. She reports working 16 hours a day for multiple days in a row. Could she please do either 12 hour shifts or the 16 hour shifts but spaced out with a day in between for the next month while she does her physical therapy. If not possible to accommodate then please let me know    If you have any questions or concerns, please don't hesitate to call.    Sincerely,        Maki Gomes MD

## 2022-09-27 NOTE — TELEPHONE ENCOUNTER
Called and spoke with Yu which is Jad perez from [employer]. Yu wanted to know is Peyton on light or full duty, how long will she be on light or full duty, what restrictions do she have, and what is the reason she can only work 12hr shifts? Yu wants a new letter faxed to [phone] and the letter must come directly from Dr. Gomes.    ----- Message from Vero Vázquez sent at 9/27/2022  2:41 PM CDT -----  Type:  Needs Medical Advice    Who Called: Yu [facility]   Symptoms (please be specific):  want to get more clarification on her return back to work paperwork   Would the patient rather a call back or a response via [facility]? call  Best Call Back Number: [phone]  Additional Information: before 4:30p today in order for the above Pt to return back to work

## 2022-09-28 NOTE — TELEPHONE ENCOUNTER
----- Message from Katerina Richardson sent at 9/28/2022  8:28 AM CDT -----  Contact: Yu  Type:  Patient return to work slip    Who Called:Yu zavala (pt's employer)  Would the patient rather a call back or a response via [facility]? call  Best Call Back Number:  [phone]  Additional Information:   Caller stated she called yesterday regarding this   Caller stated they did not receive a letter stating she can return w/ restrictions  Please re send fax with correct detailed info  Fax# [phone]

## 2022-10-26 ENCOUNTER — CLINICAL SUPPORT (OUTPATIENT)
Dept: REHABILITATION | Facility: HOSPITAL | Age: 27
End: 2022-10-26
Attending: OBSTETRICS & GYNECOLOGY
Payer: COMMERCIAL

## 2022-10-26 DIAGNOSIS — M25.559 HIP PAIN: ICD-10-CM

## 2022-10-26 DIAGNOSIS — Z37.9 VACUUM-ASSISTED VAGINAL DELIVERY: ICD-10-CM

## 2022-10-26 DIAGNOSIS — R10.2 CHRONIC FEMALE PELVIC PAIN: ICD-10-CM

## 2022-10-26 DIAGNOSIS — G89.29 CHRONIC FEMALE PELVIC PAIN: ICD-10-CM

## 2022-10-26 PROCEDURE — 97530 THERAPEUTIC ACTIVITIES: CPT | Mod: PO

## 2022-10-26 PROCEDURE — 97140 MANUAL THERAPY 1/> REGIONS: CPT | Mod: PO

## 2022-10-26 PROCEDURE — 97161 PT EVAL LOW COMPLEX 20 MIN: CPT | Mod: PO

## 2022-10-28 ENCOUNTER — PATIENT MESSAGE (OUTPATIENT)
Dept: OBSTETRICS AND GYNECOLOGY | Facility: CLINIC | Age: 27
End: 2022-10-28
Payer: COMMERCIAL

## 2022-10-28 ENCOUNTER — TELEPHONE (OUTPATIENT)
Dept: OBSTETRICS AND GYNECOLOGY | Facility: HOSPITAL | Age: 27
End: 2022-10-28

## 2022-10-28 DIAGNOSIS — M25.559 HIP PAIN: ICD-10-CM

## 2022-10-28 PROBLEM — G89.29 CHRONIC FEMALE PELVIC PAIN: Status: ACTIVE | Noted: 2022-10-28

## 2022-10-28 PROBLEM — R10.2 CHRONIC FEMALE PELVIC PAIN: Status: ACTIVE | Noted: 2022-10-28

## 2022-10-28 NOTE — TELEPHONE ENCOUNTER
Put in a ultrasound of the groin, can you help her set that up? Ok to write an excuse for the weekend.

## 2022-10-28 NOTE — PLAN OF CARE
"[facility]  Pelvic Health Physical Therapy Initial Evaluation    Date: 10/26/2022   Name: Peyton Qiu  Clinic Number: [id number]  Therapy Diagnosis:   Encounter Diagnoses   Name Primary?    Vacuum-assisted vaginal delivery     Chronic female pelvic pain     Hip pain      Physician: Maki Gomes MD    Physician Orders: Pelvic PT Eval and Treat  Medical Diagnosis from Referral: Vacuum-assisted vaginal delivery [Z37.9], Chronic female pelvic pain [R10.2, G89.29], Hip pain [M25.559]  Evaluation Date: 10/26/2022  Authorization Period Expiration: TBD  Plan of Care Expiration: 02/15/23  Visit # / Visits authorized:     Time In: 10:00  Time Out: 11:00  Total Appointment Time (timed & untimed codes): 60 minutes    Precautions: universal    Subjective     Date of onset: with the birth of baby     History of current condition - Peyton reports: She had a vacuum-assisted delivery with second degree tearing (stitched) with no other major complications. She is doing well, fully back to work (not allowed to work more than 12 hours). She is experiencing pain on the R side in the lumbar/SI and posterolateral hip region. Describes it as a "popping or feeling like it needs to pop". It is worsened when she maintains a position for too long (sitting or standing) or if she is very active on her feet. She is unable to lie down on her back, stomach, or sides due to the pain. Has to sleep reclined. Also gets some sharp shooting pains between the pubic bone and the vagina after sitting for a while. She denies incontinence, constipation, or pelvic heaviness.    OB/GYN History:    Sexually active? Not yet  Pain with vaginal exams, intercourse or tampon use? unsure    Bladder/Bowel History:   Frequency of urination:   Daytime: every two hours, urge is strong every time          Nighttime: 3x/night  Difficulty initiating urine stream: No  Urine stream: strong  Complete emptying: Yes  Bladder leakage: " No  Urinary Urgency: Yes  Able to delay the urge for at most 5 minute(s).  Frequency of bowel movements: 2-3 times per day  Difficulty initiating BM: No  Quality/Shape of BM: soft and formed  Does Patient Feel Empty after BM? Yes  Fiber Supplements or Laxative Use?  No  Colon leakage: No  Prior Therapy/Previous treatment included: none  Social History: lives with their family  Current exercise: not right now  Occupation: Pt works as a  and is back on full duty.  Prior Level of Function: independent  Current Level of Function: independent, with impaired ADL participation due to pain    Types of fluid intake: water, body armor  Diet: normal   Habitus:well developed, well nourished  Abuse/Neglect: No     PAIN:  Location: R hip/low back   Description: popping, aching, feeling like it needs to pop  Aggravating Factors/Activities that cause symptoms: Prolonged standing, Prolonged sitting, and Walking   Easing Factors: nothing     Medical History: Peyton  has a past medical history of Eczema, Seasonal allergic rhinitis, and Vertigo.     Surgical History:  Peyton Steinbergmilan Summersro  has a past surgical history that includes [place] tooth extraction.    Medications: Peyton has a current medication list which includes the following prescription(s): ferrous sulfate, ibuprofen, norethindrone, and prenatal vit no.124/iron/folic.    Allergies:   Review of patient's allergies indicates:   Allergen Reactions    Augmentin [amoxicillin-pot clavulanate] Rash    Betamethasone, augmented Diarrhea, Hives, Nausea And Vomiting and Rash        Imaging See EMR for full imaging workup    Pts goals: to reduce pain and optimize function    OBJECTIVE     See EMR under MEDIA for written consent provided 10/26/2022  Chaperone: Pt's mother present throughout visit    ORTHO SCREEN  Posture in sitting: slouched   Posture in standing: forward head and forward and rounded shoulders   Pelvic alignment: no sign of deviations noted in  supine. Wide leg gait with minor R antalgic pattern  SI Joint Palpation: Tenderness to the right SI joint palpation. Tenderness to the left SI joint palpation.  Sacral spring test: negative (Positive=NO spring)  Adductor Palpation: tender, increased tension , and hypertonic  Palpation: significant tenderness to R TFL, Gluteals, IT band. B QL tight and tender  Mobility: slow but independent bed mobility. Pain with supine and sidelying positions. Poor core stability  Strength: Hip abduction 3+ with pain bilaterally    VAGINAL PELVIC FLOOR EXAM    Vaginal exam deferred.    Limitation/Restriction for FOTO Survey  FOTO not captured at eval       TREATMENT     Treatment Time In: 10:35  Treatment Time Out: 11:00  Total Treatment time (time-based codes) separate from Evaluation: 25 minutes    Manual Therapy to develop extensibility and desensitization for 10 minutes including: percussive massage (theragun) and manual trp release to R gluteals and TFL/IT band    Therapeutic Activity Patient participated in dynamic functional therapeutic activities to improve functional performance for 10 minutes. Including: Education as described below.     Patient Education provided:   general anatomy/physiology of urinary/ bowel  system, benefits of treatment, risks of treatment, and alternative methods of treatment were discussed with the pt. Additionally, stretches for tense muscles and core strengthening exercises were reviewed. Pt provided with sample [place] belt to wear throughout the session for size and comfort.    Home Exercises Provided:  Written Home Exercises Provided: yes.  Exercises were reviewed and Peyton was able to demonstrate them prior to the end of the session.    Peyton demonstrated good  understanding of the education provided.     See EMR under Patient Instructions for exercises provided 10/26/2022.    Assessment     Peyton is a 26 y.o. female referred to outpatient Physical Therapy with a medical diagnosis of  Vacuum-assisted vaginal delivery [Z37.9], Chronic female pelvic pain [R10.2, G89.29], Hip pain [M25.559]. Pt presents with altered posture, poor knowledge of body mechanics and posture, poor trunk stability, and pelvic pain interfering with ADLs and activity tolerance. Pt reported great improvement in her low back pain upon wearing the SI belt provided - I encouraged pt to purchase to wear during long work shifts.    Pt prognosis is Good.   Pt will benefit from skilled outpatient Physical Therapy to address the deficits stated above and in the chart below, provide pt/family education, and to maximize pt's level of independence.     Plan of care discussed with patient: Yes  Pt's spiritual, cultural and educational needs considered and patient is agreeable to the plan of care and goals as stated below:       Anticipated Barriers for therapy: none    Medical Necessity is demonstrated by the following     History  Co-morbidities and personal factors that may impact the plan of care Co-morbidities:   postpartum     Personal Factors:   no deficits       low   Examination  Body Structures and Functions, activity limitations and participation restrictions that may impact the plan of care Body Regions/Systems/Functions:  altered posture, poor knowledge of body mechanics and posture, poor trunk stability, increased nocturia and muscular imbalances and weakness.  Activity Limitations:  Pain with ADLs     Participation Restrictions:  exercise restrictions due to pain      Activity limitations:   Learning and applying knowledge  no deficits     General Tasks and Commands  no deficits     Communication  no deficits     Mobility  no deficits     Self care  no deficits     Domestic Life  no deficits     Interactions/Relationships  no deficits     Life Areas  no deficits     Community and Social Life  no deficits          moderate   Clinical Presentation stable and uncomplicated low   Decision Making/ Complexity Score: low         Short Term Goals: 4 weeks  Pt will report improved ability to engage pelvic/abdominal brace with < or = 2/10 pain for improved tolerance of functional transfers/mobility.   Pt to demonstrate proper diaphragmatic breathing to help with calming the nervous system in order to decrease pain and to improve abdominal wall musculature extensibility.   Pt to report being able to complete a half day at work without significant increase in pain to demonstrate a return towards prior level of function.  Pt to report a decrease in pain to no more than 6/10 at it's worst with walking.       Long Term Goals: 12 weeks   Pt to be discharged with home plan for carry over after discharge.   Pt will be trained and compliant with postural strategies in sitting and standing to improve alignment and decrease pain and muscle fatigue  Pt to report being able to complete a full day at work without significant increase in pain to demonstrate a return towards prior level of function.  Pt to report improved restorative sleeping, waking up no more than 1x/night from pain or urinary urges.  Pt to report a decrease in pain to no more than 3/10 at it's worst with walking.      Plan     Plan of care Certification: 10/26/2022 to 02/15/23.    Outpatient Physical Therapy 1 times weekly for 16 weeks to include the following interventions: therapeutic exercises, therapeutic activity, neuromuscular re-education, manual therapy, modalities PRN, patient/family education, dry needling, and self care/home management    I appreciate your consult and look forward to participating in this patient's care.    Piedad Brandt, PT, DPT

## 2022-11-07 ENCOUNTER — HOSPITAL ENCOUNTER (OUTPATIENT)
Dept: RADIOLOGY | Facility: HOSPITAL | Age: 27
Discharge: HOME | End: 2022-11-07
Attending: OBSTETRICS & GYNECOLOGY
Payer: COMMERCIAL

## 2022-11-07 ENCOUNTER — PATIENT MESSAGE (OUTPATIENT)
Dept: OBSTETRICS AND GYNECOLOGY | Facility: CLINIC | Age: 27
End: 2022-11-07
Payer: COMMERCIAL

## 2022-11-07 DIAGNOSIS — M25.559 HIP PAIN: Primary | ICD-10-CM

## 2022-11-07 DIAGNOSIS — M25.559 HIP PAIN: ICD-10-CM

## 2022-11-07 PROCEDURE — 93971 EXTREMITY STUDY: CPT | Mod: 26,RT,, | Performed by: RADIOLOGY

## 2022-11-07 PROCEDURE — 93971 US LOWER EXTREMITY VEINS RIGHT: ICD-10-PCS | Mod: 26,RT,, | Performed by: RADIOLOGY

## 2022-11-07 PROCEDURE — 93971 EXTREMITY STUDY: CPT | Mod: TC,RT

## 2022-11-08 ENCOUNTER — PATIENT MESSAGE (OUTPATIENT)
Dept: OBSTETRICS AND GYNECOLOGY | Facility: CLINIC | Age: 27
End: 2022-11-08
Payer: COMMERCIAL

## 2022-11-09 ENCOUNTER — TELEPHONE (OUTPATIENT)
Dept: ADMINISTRATIVE | Facility: OTHER | Age: 27
End: 2022-11-09
Payer: COMMERCIAL

## 2022-11-14 ENCOUNTER — CLINICAL SUPPORT (OUTPATIENT)
Dept: REHABILITATION | Facility: HOSPITAL | Age: 27
End: 2022-11-14
Attending: FAMILY MEDICINE
Payer: COMMERCIAL

## 2022-11-14 DIAGNOSIS — R10.2 CHRONIC FEMALE PELVIC PAIN: Primary | ICD-10-CM

## 2022-11-14 DIAGNOSIS — G89.29 CHRONIC FEMALE PELVIC PAIN: Primary | ICD-10-CM

## 2022-11-14 DIAGNOSIS — M25.559 HIP PAIN: ICD-10-CM

## 2022-11-14 PROCEDURE — 97530 THERAPEUTIC ACTIVITIES: CPT | Mod: PO

## 2022-11-14 PROCEDURE — 97140 MANUAL THERAPY 1/> REGIONS: CPT | Mod: PO

## 2022-11-14 NOTE — PROGRESS NOTES
"  Pelvic Health Physical Therapy   Treatment Note     Name: Peyton Cox Qiu  Clinic Number: [id number]    Therapy Diagnosis:   Encounter Diagnoses   Name Primary?    Status post delivery at term     Hip pain     Chronic female pelvic pain Yes     Physician: Maki Gomes MD    Visit Date: 11/14/2022    Physician Orders: Pelvic PT Eval and Treat  Medical Diagnosis from Referral: Vacuum-assisted vaginal delivery [Z37.9], Chronic female pelvic pain [R10.2, G89.29], Hip pain [M25.559]  Evaluation Date: 10/26/2022  Authorization Period Expiration: TBD  Plan of Care Expiration: 02/15/23  Visit # / Visits authorized: 1/ 1  Cancelled Visits: 0  No Show Visits: 0    Time In: 3:33  Time Out: 4:30  Total Billable Time: 57 minutes    Precautions: Standard    Subjective     Pt reports: She is starting to feel a little better. Having good days and bad days. Tried sleeping in the bed for the first time in a while, felt like she was "stuck", felt a pop when getting up. L side feeling okay.  Still having urgency of urine. Walking a little bit better now, but can't run due to the pain. Tolerance for stairs is about two flights.   She was compliant with home exercise program.  Functional change: ongoing    Pain: 3/10  Location: R hip and low back    Constitutional Symptoms Review: The patient denies having any constitutional symptoms.     Objective   Pt verbally consents to treatment today.  Signed consent form already on file.   Peyton received the following manual therapy techniques: to develop extensibility, reduce muscle spasm, improve circulation, for 40 minutes including: see chart below  Peyton participated in dynamic functional therapeutic activities to improve functional performance for 10  minutes, including: see chart below    Type Intervention 11/14/22   Manual  Dry needling to R gluteals, R TFL, R piriformis x   Therac Reviewed HEP, POC progression x                                        Home Exercises " Provided and Patient Education Provided     Education provided:   -  continue prior HEP  Discussed progression of plan of care with patient; educated pt in activity modification; reviewed HEP with pt. Pt demonstrated and verbalized understanding of all instruction and was not provided with a handout of HEP (see Patient Instructions).    Written Home Exercises Provided: yes.  Exercises were reviewed and Peyton was able to demonstrate them prior to the end of the session.  Peyton demonstrated good  understanding of the education provided.     See EMR under Patient Instructions for exercises provided 11/14/2022.    Assessment     Peyton tolerated treatment well and demonstrated understanding of all education provided at today's visit. She tolerated dry needling with no adverse response. Needling elicited several deep twitch responses in the gluteals, and the patient noted feeling improvement in pain and mobility immediately afterwards. I encouraged the patient to remain mobile for the evening (walking, stretching), and to drink plenty of water. Educated on typical response to dry needling, including temporary soreness.  Peyton Is progressing well towards her goals.   Pt prognosis is Good.     Pt will continue to benefit from skilled outpatient physical therapy to address the deficits listed in the problem list box on initial evaluation, provide pt/family education and to maximize pt's level of independence in the home and community environment.     Pt's spiritual, cultural and educational needs considered and pt agreeable to plan of care and goals.     Anticipated barriers to physical therapy: scheduling    Short Term Goals: 4 weeks  Pt will report improved ability to engage pelvic/abdominal brace with < or = 2/10 pain for improved tolerance of functional transfers/mobility.   Pt to demonstrate proper diaphragmatic breathing to help with calming the nervous system in order to decrease pain and to improve abdominal wall  musculature extensibility.   Pt to report being able to complete a half day at work without significant increase in pain to demonstrate a return towards prior level of function.  Pt to report a decrease in pain to no more than 6/10 at it's worst with walking.       Long Term Goals: 12 weeks   Pt to be discharged with home plan for carry over after discharge.   Pt will be trained and compliant with postural strategies in sitting and standing to improve alignment and decrease pain and muscle fatigue  Pt to report being able to complete a full day at work without significant increase in pain to demonstrate a return towards prior level of function.  Pt to report improved restorative sleeping, waking up no more than 1x/night from pain or urinary urges.  Pt to report a decrease in pain to no more than 3/10 at it's worst with walking.      Plan     Continue per established POC.     Piedad Brandt, PT , DPT

## 2022-11-17 ENCOUNTER — PATIENT MESSAGE (OUTPATIENT)
Dept: OBSTETRICS AND GYNECOLOGY | Facility: CLINIC | Age: 27
End: 2022-11-17
Payer: COMMERCIAL

## 2022-11-18 ENCOUNTER — PATIENT MESSAGE (OUTPATIENT)
Dept: OBSTETRICS AND GYNECOLOGY | Facility: CLINIC | Age: 27
End: 2022-11-18
Payer: COMMERCIAL

## 2022-11-22 ENCOUNTER — OFFICE VISIT (OUTPATIENT)
Dept: OBSTETRICS AND GYNECOLOGY | Facility: CLINIC | Age: 27
End: 2022-11-22
Payer: COMMERCIAL

## 2022-11-22 VITALS — SYSTOLIC BLOOD PRESSURE: 130 MMHG | BODY MASS INDEX: 55.32 KG/M2 | WEIGHT: 293 LBS | DIASTOLIC BLOOD PRESSURE: 78 MMHG

## 2022-11-22 DIAGNOSIS — M71.9 BURSITIS, UNSPECIFIED SITE: ICD-10-CM

## 2022-11-22 DIAGNOSIS — B37.2 YEAST DERMATITIS: Primary | ICD-10-CM

## 2022-11-22 DIAGNOSIS — M25.559 HIP PAIN: ICD-10-CM

## 2022-11-22 PROCEDURE — 1159F PR MEDICATION LIST DOCUMENTED IN MEDICAL RECORD: ICD-10-PCS | Mod: CPTII,S$GLB,, | Performed by: OBSTETRICS & GYNECOLOGY

## 2022-11-22 PROCEDURE — 3044F HG A1C LEVEL LT 7.0%: CPT | Mod: CPTII,S$GLB,, | Performed by: OBSTETRICS & GYNECOLOGY

## 2022-11-22 PROCEDURE — 1160F PR REVIEW ALL MEDS BY PRESCRIBER/CLIN PHARMACIST DOCUMENTED: ICD-10-PCS | Mod: CPTII,S$GLB,, | Performed by: OBSTETRICS & GYNECOLOGY

## 2022-11-22 PROCEDURE — 99214 PR OFFICE/OUTPT VISIT, EST, LEVL IV, 30-39 MIN: ICD-10-PCS | Mod: S$GLB,,, | Performed by: OBSTETRICS & GYNECOLOGY

## 2022-11-22 PROCEDURE — 3075F PR MOST RECENT SYSTOLIC BLOOD PRESS GE 130-139MM HG: ICD-10-PCS | Mod: CPTII,S$GLB,, | Performed by: OBSTETRICS & GYNECOLOGY

## 2022-11-22 PROCEDURE — 99999 PR PBB SHADOW E&M-EST. PATIENT-LVL III: ICD-10-PCS | Mod: PBBFAC,,, | Performed by: OBSTETRICS & GYNECOLOGY

## 2022-11-22 PROCEDURE — 1159F MED LIST DOCD IN RCRD: CPT | Mod: CPTII,S$GLB,, | Performed by: OBSTETRICS & GYNECOLOGY

## 2022-11-22 PROCEDURE — 3078F PR MOST RECENT DIASTOLIC BLOOD PRESSURE < 80 MM HG: ICD-10-PCS | Mod: CPTII,S$GLB,, | Performed by: OBSTETRICS & GYNECOLOGY

## 2022-11-22 PROCEDURE — 3075F SYST BP GE 130 - 139MM HG: CPT | Mod: CPTII,S$GLB,, | Performed by: OBSTETRICS & GYNECOLOGY

## 2022-11-22 PROCEDURE — 1160F RVW MEDS BY RX/DR IN RCRD: CPT | Mod: CPTII,S$GLB,, | Performed by: OBSTETRICS & GYNECOLOGY

## 2022-11-22 PROCEDURE — 3008F PR BODY MASS INDEX (BMI) DOCUMENTED: ICD-10-PCS | Mod: CPTII,S$GLB,, | Performed by: OBSTETRICS & GYNECOLOGY

## 2022-11-22 PROCEDURE — 3078F DIAST BP <80 MM HG: CPT | Mod: CPTII,S$GLB,, | Performed by: OBSTETRICS & GYNECOLOGY

## 2022-11-22 PROCEDURE — 3044F PR MOST RECENT HEMOGLOBIN A1C LEVEL <7.0%: ICD-10-PCS | Mod: CPTII,S$GLB,, | Performed by: OBSTETRICS & GYNECOLOGY

## 2022-11-22 PROCEDURE — 3008F BODY MASS INDEX DOCD: CPT | Mod: CPTII,S$GLB,, | Performed by: OBSTETRICS & GYNECOLOGY

## 2022-11-22 PROCEDURE — 99214 OFFICE O/P EST MOD 30 MIN: CPT | Mod: S$GLB,,, | Performed by: OBSTETRICS & GYNECOLOGY

## 2022-11-22 PROCEDURE — 99999 PR PBB SHADOW E&M-EST. PATIENT-LVL III: CPT | Mod: PBBFAC,,, | Performed by: OBSTETRICS & GYNECOLOGY

## 2022-11-22 RX ORDER — KETOCONAZOLE 20 MG/G
CREAM TOPICAL 2 TIMES DAILY
Qty: 30 G | Refills: 0 | Status: SHIPPED | OUTPATIENT
Start: 2022-11-22

## 2022-11-22 RX ORDER — METFORMIN HYDROCHLORIDE 500 MG/1
500 TABLET, EXTENDED RELEASE ORAL
Qty: 90 TABLET | Refills: 3 | Status: SHIPPED | OUTPATIENT
Start: 2022-11-22 | End: 2023-11-22

## 2022-11-22 RX ORDER — TRIAMCINOLONE ACETONIDE 1 MG/G
OINTMENT TOPICAL 2 TIMES DAILY
Qty: 30 G | Refills: 0 | Status: SHIPPED | OUTPATIENT
Start: 2022-11-22

## 2022-11-22 NOTE — LETTER
November 22, 2022    Peyton Qiu  [address]  [place] [address]         [facility]  [address]  [place] [address]  Phone: [phone] November 22, 2022     Patient: Peyton Qiu   YOB: 1995   Date of Visit: 11/22/2022       o Whom It May Concern:    Peyton is having residual hip pain from her delivery and is starting physical therapy and seeing orthopedics. If possible, please allow her to do restricted hours at work. She reports working 16 hours a day for multiple days in a row. Could she please do 12 hour shiftsbut spaced out with a day in between for the next month while she does her physical therapy. If not possible to accommodate then please let me know    If you have any questions or concerns, please don't hesitate to call.    Sincerely,        Maki Gomes MD

## 2022-11-22 NOTE — PROGRESS NOTES
Chief Complaint   Patient presents with    Possible mastitis       HPI:   Peyton Qiu 27 y.o.  is here for significant itching and skin discoloration under right breast. Doesn't seem to be an issue with left. She has been working more and sweating more. She is still having hip pain but better with therapy and much better after dry needing. Has appt with ortho. She would like a work excuse to continue the 12 hour shifts because she has been doing those daily and working well for her. She would like to restart her metformin for her PCOS.      No LMP recorded.     Past Medical History:   Diagnosis Date    Eczema     Seasonal allergic rhinitis     Vertigo        Past Surgical History:   Procedure Laterality Date    WISDOM TOOTH EXTRACTION         Family History   Problem Relation Age of Onset    Coronary artery disease Father     Diabetes Maternal Grandmother     Heart disease Maternal Grandmother     No Known Problems Mother        Social History     Socioeconomic History    Marital status: Single   Tobacco Use    Smoking status: Never    Smokeless tobacco: Never   Substance and Sexual Activity    Alcohol use: No    Drug use: Never    Sexual activity: Yes     Partners: Male       OB History          1    Para   1    Term   1       0    AB   0    Living   1         SAB   0    IAB   0    Ectopic   0    Multiple   0    Live Births   1                        ROS:  .   All other ROS negative     PE:   /78   Wt (!) 141.7 kg (312 lb 4.5 oz)   Breastfeeding Yes   BMI 55.32 kg/m²     APPEARANCE: Well nourished, well developed, in no acute distress.  BREASTS: red/purple patches with satellite lesions that extend about 4 cm from top and bottom of crease of right breast, has some excoriations, left breast normal no masses or lymphadenopathy, nipples normal      1. Yeast dermatitis    2. Hip pain    3. Bursitis, unspecified site        Plan:  Metformin sent in. Discussed side effects,  declines oral GTT test  Hip pain most likely bursistis but since going on for a while and not better, referral for ortho sent.   Rash most consisent with yeast, will treat and discussed keeping the area as dry as possible. If not improved let me know and will send to derm    Face to Face time with patient: 30 minutes of total time spent on the encounter, which includes face to face time and non-face to face time preparing to see the patient (eg, review of tests), Obtaining and/or reviewing separately obtained history, Documenting clinical information in the electronic or other health record, Independently interpreting results (not separately reported) and communicating results to the patient/family/caregiver, or Care coordination (not separately reported).

## 2022-11-28 ENCOUNTER — PATIENT MESSAGE (OUTPATIENT)
Dept: HEMATOLOGY/ONCOLOGY | Facility: CLINIC | Age: 27
End: 2022-11-28

## 2022-11-28 ENCOUNTER — OFFICE VISIT (OUTPATIENT)
Dept: HEMATOLOGY/ONCOLOGY | Facility: CLINIC | Age: 27
End: 2022-11-28
Payer: COMMERCIAL

## 2022-11-28 ENCOUNTER — CLINICAL SUPPORT (OUTPATIENT)
Dept: REHABILITATION | Facility: HOSPITAL | Age: 27
End: 2022-11-28
Attending: OBSTETRICS & GYNECOLOGY
Payer: COMMERCIAL

## 2022-11-28 ENCOUNTER — LAB VISIT (OUTPATIENT)
Dept: LAB | Facility: HOSPITAL | Age: 27
End: 2022-11-28
Attending: INTERNAL MEDICINE
Payer: COMMERCIAL

## 2022-11-28 VITALS
DIASTOLIC BLOOD PRESSURE: 78 MMHG | BODY MASS INDEX: 51.91 KG/M2 | HEART RATE: 93 BPM | HEIGHT: 63 IN | RESPIRATION RATE: 20 BRPM | OXYGEN SATURATION: 97 % | TEMPERATURE: 98 F | SYSTOLIC BLOOD PRESSURE: 123 MMHG | WEIGHT: 293 LBS

## 2022-11-28 DIAGNOSIS — D50.9 IRON DEFICIENCY ANEMIA DURING PREGNANCY: ICD-10-CM

## 2022-11-28 DIAGNOSIS — E66.01 MORBID OBESITY: ICD-10-CM

## 2022-11-28 DIAGNOSIS — D50.0 IRON DEFICIENCY ANEMIA SECONDARY TO BLOOD LOSS (CHRONIC): Primary | ICD-10-CM

## 2022-11-28 DIAGNOSIS — O99.019 IRON DEFICIENCY ANEMIA DURING PREGNANCY: ICD-10-CM

## 2022-11-28 DIAGNOSIS — R10.2 CHRONIC FEMALE PELVIC PAIN: Primary | ICD-10-CM

## 2022-11-28 DIAGNOSIS — M25.559 HIP PAIN: ICD-10-CM

## 2022-11-28 DIAGNOSIS — G89.29 CHRONIC FEMALE PELVIC PAIN: Primary | ICD-10-CM

## 2022-11-28 LAB
BASOPHILS # BLD AUTO: 0.03 K/UL (ref 0–0.2)
BASOPHILS NFR BLD: 0.5 % (ref 0–1.9)
DIFFERENTIAL METHOD: ABNORMAL
EOSINOPHIL # BLD AUTO: 0.1 K/UL (ref 0–0.5)
EOSINOPHIL NFR BLD: 1.5 % (ref 0–8)
ERYTHROCYTE [DISTWIDTH] IN BLOOD BY AUTOMATED COUNT: 15.9 % (ref 11.5–14.5)
FERRITIN SERPL-MCNC: 31 NG/ML (ref 20–300)
HCT VFR BLD AUTO: 40.7 % (ref 37–48.5)
HGB BLD-MCNC: 12.8 G/DL (ref 12–16)
IMM GRANULOCYTES # BLD AUTO: 0.01 K/UL (ref 0–0.04)
IMM GRANULOCYTES NFR BLD AUTO: 0.2 % (ref 0–0.5)
LYMPHOCYTES # BLD AUTO: 2.5 K/UL (ref 1–4.8)
LYMPHOCYTES NFR BLD: 37.3 % (ref 18–48)
MCH RBC QN AUTO: 26 PG (ref 27–31)
MCHC RBC AUTO-ENTMCNC: 31.4 G/DL (ref 32–36)
MCV RBC AUTO: 83 FL (ref 82–98)
MONOCYTES # BLD AUTO: 0.4 K/UL (ref 0.3–1)
MONOCYTES NFR BLD: 6.7 % (ref 4–15)
NEUTROPHILS # BLD AUTO: 3.6 K/UL (ref 1.8–7.7)
NEUTROPHILS NFR BLD: 53.8 % (ref 38–73)
NRBC BLD-RTO: 0 /100 WBC
PLATELET # BLD AUTO: 412 K/UL (ref 150–450)
PMV BLD AUTO: 8.8 FL (ref 9.2–12.9)
RBC # BLD AUTO: 4.93 M/UL (ref 4–5.4)
WBC # BLD AUTO: 6.59 K/UL (ref 3.9–12.7)

## 2022-11-28 PROCEDURE — 3074F PR MOST RECENT SYSTOLIC BLOOD PRESSURE < 130 MM HG: ICD-10-PCS | Mod: CPTII,S$GLB,, | Performed by: INTERNAL MEDICINE

## 2022-11-28 PROCEDURE — 3078F PR MOST RECENT DIASTOLIC BLOOD PRESSURE < 80 MM HG: ICD-10-PCS | Mod: CPTII,S$GLB,, | Performed by: INTERNAL MEDICINE

## 2022-11-28 PROCEDURE — 36415 COLL VENOUS BLD VENIPUNCTURE: CPT | Performed by: INTERNAL MEDICINE

## 2022-11-28 PROCEDURE — 3078F DIAST BP <80 MM HG: CPT | Mod: CPTII,S$GLB,, | Performed by: INTERNAL MEDICINE

## 2022-11-28 PROCEDURE — 3008F BODY MASS INDEX DOCD: CPT | Mod: CPTII,S$GLB,, | Performed by: INTERNAL MEDICINE

## 2022-11-28 PROCEDURE — 1160F RVW MEDS BY RX/DR IN RCRD: CPT | Mod: CPTII,S$GLB,, | Performed by: INTERNAL MEDICINE

## 2022-11-28 PROCEDURE — 1159F PR MEDICATION LIST DOCUMENTED IN MEDICAL RECORD: ICD-10-PCS | Mod: CPTII,S$GLB,, | Performed by: INTERNAL MEDICINE

## 2022-11-28 PROCEDURE — 3008F PR BODY MASS INDEX (BMI) DOCUMENTED: ICD-10-PCS | Mod: CPTII,S$GLB,, | Performed by: INTERNAL MEDICINE

## 2022-11-28 PROCEDURE — 99999 PR PBB SHADOW E&M-EST. PATIENT-LVL III: CPT | Mod: PBBFAC,,, | Performed by: INTERNAL MEDICINE

## 2022-11-28 PROCEDURE — 99999 PR PBB SHADOW E&M-EST. PATIENT-LVL III: ICD-10-PCS | Mod: PBBFAC,,, | Performed by: INTERNAL MEDICINE

## 2022-11-28 PROCEDURE — 3044F PR MOST RECENT HEMOGLOBIN A1C LEVEL <7.0%: ICD-10-PCS | Mod: CPTII,S$GLB,, | Performed by: INTERNAL MEDICINE

## 2022-11-28 PROCEDURE — 99214 PR OFFICE/OUTPT VISIT, EST, LEVL IV, 30-39 MIN: ICD-10-PCS | Mod: S$GLB,,, | Performed by: INTERNAL MEDICINE

## 2022-11-28 PROCEDURE — 85025 COMPLETE CBC W/AUTO DIFF WBC: CPT | Performed by: INTERNAL MEDICINE

## 2022-11-28 PROCEDURE — 99214 OFFICE O/P EST MOD 30 MIN: CPT | Mod: S$GLB,,, | Performed by: INTERNAL MEDICINE

## 2022-11-28 PROCEDURE — 82728 ASSAY OF FERRITIN: CPT | Performed by: INTERNAL MEDICINE

## 2022-11-28 PROCEDURE — 1159F MED LIST DOCD IN RCRD: CPT | Mod: CPTII,S$GLB,, | Performed by: INTERNAL MEDICINE

## 2022-11-28 PROCEDURE — 3044F HG A1C LEVEL LT 7.0%: CPT | Mod: CPTII,S$GLB,, | Performed by: INTERNAL MEDICINE

## 2022-11-28 PROCEDURE — 84466 ASSAY OF TRANSFERRIN: CPT | Performed by: INTERNAL MEDICINE

## 2022-11-28 PROCEDURE — 1160F PR REVIEW ALL MEDS BY PRESCRIBER/CLIN PHARMACIST DOCUMENTED: ICD-10-PCS | Mod: CPTII,S$GLB,, | Performed by: INTERNAL MEDICINE

## 2022-11-28 PROCEDURE — 3074F SYST BP LT 130 MM HG: CPT | Mod: CPTII,S$GLB,, | Performed by: INTERNAL MEDICINE

## 2022-11-28 PROCEDURE — 97140 MANUAL THERAPY 1/> REGIONS: CPT | Mod: PO

## 2022-11-28 NOTE — PROGRESS NOTES
Pelvic Health Physical Therapy   Treatment Note     Name: Peyton Cox Qiu  [facility] Number: [id number]    Therapy Diagnosis:   Encounter Diagnoses   Name Primary?    Chronic female pelvic pain Yes    Hip pain      Physician: Maki Gomes MD    Visit Date: 11/28/2022    Physician Orders: Pelvic PT Eval and Treat  Medical Diagnosis from Referral: Vacuum-assisted vaginal delivery [Z37.9], Chronic female pelvic pain [R10.2, G89.29], Hip pain [M25.559]  Evaluation Date: 10/26/2022  Authorization Period Expiration: 12/31/22  Plan of Care Expiration: 02/15/23  Visit # / Visits authorized: 5/16  Cancelled Visits: 0  No Show Visits: 0    Time In: 3:47 (late arrival)  Time Out: 4:30  Total Billable Time: 43 minutes    Precautions: Standard    Subjective     Pt reports: the hip is improving. She sees ortho soon. The dry needling helped a lot.  She was compliant with home exercise program.  Functional change: ongoing    Pain: 3/10  Location: R hip and low back    Constitutional Symptoms Review: The patient denies having any constitutional symptoms.     Objective   Pt verbally consents to treatment today.  Signed consent form already on file.   Peyton received the following manual therapy techniques: to develop extensibility, reduce muscle spasm, improve circulation, for 40 minutes including: see chart below  Peyton participated in dynamic functional therapeutic activities to improve functional performance for 10  minutes, including: see chart below    Type Intervention 11/14/22 11/28/22   Manual  Dry needling to R gluteals, R TFL, R piriformis x x   Therac Reviewed HEP, POC progression x                                                Home Exercises Provided and Patient Education Provided     Education provided:   -  continue prior HEP  Discussed progression of plan of care with patient; educated pt in activity modification; reviewed HEP with pt. Pt demonstrated and verbalized understanding of all instruction and  was not provided with a handout of HEP (see Patient Instructions).    Written Home Exercises Provided: yes.  Exercises were reviewed and Peyton was able to demonstrate them prior to the end of the session.  Peyton demonstrated good  understanding of the education provided.     See EMR under Patient Instructions for exercises provided 11/14/2022.    Assessment     Peyton tolerated treatment well and demonstrated understanding of all education provided at today's visit. She tolerated dry needling with no adverse response. I encouraged the patient to remain mobile for the evening (walking, stretching), and to drink plenty of water. Educated on typical response to dry needling, including temporary soreness.  Peyton Is progressing well towards her goals.   Pt prognosis is Good.     Pt will continue to benefit from skilled outpatient physical therapy to address the deficits listed in the problem list box on initial evaluation, provide pt/family education and to maximize pt's level of independence in the home and community environment.     Pt's spiritual, cultural and educational needs considered and pt agreeable to plan of care and goals.     Anticipated barriers to physical therapy: scheduling    Short Term Goals: 4 weeks  Pt will report improved ability to engage pelvic/abdominal brace with < or = 2/10 pain for improved tolerance of functional transfers/mobility.   Pt to demonstrate proper diaphragmatic breathing to help with calming the nervous system in order to decrease pain and to improve abdominal wall musculature extensibility.   Pt to report being able to complete a half day at work without significant increase in pain to demonstrate a return towards prior level of function.  Pt to report a decrease in pain to no more than 6/10 at it's worst with walking.       Long Term Goals: 12 weeks   Pt to be discharged with home plan for carry over after discharge.   Pt will be trained and compliant with postural strategies  in sitting and standing to improve alignment and decrease pain and muscle fatigue  Pt to report being able to complete a full day at work without significant increase in pain to demonstrate a return towards prior level of function.  Pt to report improved restorative sleeping, waking up no more than 1x/night from pain or urinary urges.  Pt to report a decrease in pain to no more than 3/10 at it's worst with walking.      Plan     Continue per established POC.     Piedad Brandt, PT , DPT

## 2022-11-28 NOTE — PROGRESS NOTES
PATIENT: Peyton Qiu  MRN: [id number]  DATE: 11/28/2022    Diagnosis:   1. Iron deficiency anemia secondary to blood loss (chronic)      Chief Complaint: iron deficiency anemia    Subjective:    History of Present Illness: Ms. Qiu is a 27 y.o. female who presented in June 2022 for evaluation and management of iron deficiency anemia during pregnancy. She was referred by Dr. Gomes.    - labs since 2021 reveal a worsening microcytic anemia.  - she endorses a history of menorrhagia before she became pregnant.  - she has been taking oral iron with vitamin C. She denies side effects from oral iron.    Interval history:  - she presents for a follow-up appointment for her iron deficiency anemia.  - she received iron sucrose x 4 doses in June - August 2022.  - she gave birth to on 8/11/22  - today, she endorses fatigue. She is breast feeding so her menstrual cycles have not resumed.  - She denies chest pain, nausea, vomiting, diarrhea, constipation.        Past medical, surgical, family, and social histories have been reviewed and updated below.    Past Medical History:   Past Medical History:   Diagnosis Date    Eczema     Seasonal allergic rhinitis     Vertigo        Past Surgical History:   Past Surgical History:   Procedure Laterality Date    WISDOM TOOTH EXTRACTION         Family History:   Family History   Problem Relation Age of Onset    Coronary artery disease Father     Diabetes Maternal Grandmother     Heart disease Maternal Grandmother     No Known Problems Mother        Social History:  reports that she has never smoked. She has never used smokeless tobacco. She reports that she does not drink alcohol and does not use drugs.    Allergies:  Review of patient's allergies indicates:   Allergen Reactions    Augmentin [amoxicillin-pot clavulanate] Rash    Betamethasone, augmented Diarrhea, Hives, Nausea And Vomiting and Rash       Medications:  Current Outpatient Medications   Medication Sig  "Dispense Refill    ferrous sulfate 325 (65 FE) MG EC tablet Take 1 tablet (325 mg total) by mouth every other day. 60 tablet 3    ibuprofen (ADVIL,MOTRIN) 600 MG tablet Take 1 tablet (600 mg total) by mouth every 6 (six) hours as needed (cramping). 30 tablet 0    ketoconazole (NIZORAL) 2 % cream Apply topically 2 (two) times daily. 30 g 0    metFORMIN (GLUCOPHAGE-XR) 500 MG ER 24hr tablet Take 1 tablet (500 mg total) by mouth daily with breakfast. 90 tablet 3    norethindrone (ORTHO MICRONOR) 0.35 mg tablet Take 1 tablet (0.35 mg total) by mouth once daily. 84 tablet 3    prenatal vit no.124/iron/folic (PRENATAL VITAMIN ORAL) Take by mouth.      triamcinolone acetonide 0.1% (KENALOG) 0.1 % ointment Apply topically 2 (two) times daily. 30 g 0     No current facility-administered medications for this visit.       Review of Systems   Constitutional:  Positive for fatigue.   HENT:  Negative for sore throat.    Eyes:  Negative for visual disturbance.   Respiratory:  Positive for shortness of breath. Negative for cough.    Cardiovascular:  Negative for chest pain.   Gastrointestinal:  Negative for abdominal pain, constipation, diarrhea, nausea and vomiting.   Genitourinary:  Negative for dysuria.   Musculoskeletal:  Negative for back pain.   Skin:  Negative for rash.   Neurological:  Negative for headaches.   Hematological:  Negative for adenopathy.   Psychiatric/Behavioral:  The patient is not nervous/anxious.      ECOG Performance Status:   ECOG SCORE 1            Objective:      Vitals:   Vitals:    11/28/22 1019   BP: 123/78   BP Location: Left arm   Patient Position: Sitting   BP Method: Medium (Automatic)   Pulse: 93   Resp: 20   Temp: 98.2 °F (36.8 °C)   TempSrc: Oral   SpO2: 97%   Weight: (!) 138.1 kg (304 lb 7.3 oz)   Height: 5' 3" (1.6 m)     BMI: Body mass index is 53.93 kg/m².    Physical Exam  Vitals and nursing note reviewed.   Constitutional:       Appearance: She is well-developed.   HENT:      Head: " Normocephalic and atraumatic.   Eyes:      Pupils: Pupils are equal, round, and reactive to light.   Cardiovascular:      Rate and Rhythm: Normal rate and regular rhythm.   Pulmonary:      Effort: Pulmonary effort is normal.      Breath sounds: Normal breath sounds.   Abdominal:      General: Bowel sounds are normal.      Palpations: Abdomen is soft.   Musculoskeletal:         General: Normal range of motion.      Cervical back: Normal range of motion and neck supple.   Skin:     General: Skin is warm and dry.   Neurological:      Mental Status: She is alert and oriented to person, place, and time.   Psychiatric:         Behavior: Behavior normal.         Thought Content: Thought content normal.         Judgment: Judgment normal.       Laboratory Data:  Labs have been reviewed.    Lab Results   Component Value Date    WBC 11.19 08/12/2022    HGB 9.4 (L) 08/12/2022    HCT 30.7 (L) 08/12/2022    MCV 77 (L) 08/12/2022     08/12/2022           Imaging:    Assessment:       1. Iron deficiency anemia secondary to blood loss (chronic)    2. Morbid obesity           Plan:     1. Iron deficiency anemia  - I have reviewed her chart  - labs since 2021 reveal a worsening microcytic anemia.  - labs in June 2022 confirmed iron deficiency anemia  - she received iron sucrose x 4 doses in June - August 2022.  - check labs today. If still iron deficient, I will prescribe iron sucrose x 4 doses.  - she endorses a history of menorrhagia before she became pregnant.  - continue oral iron  - return to clinic in 4 months with repeat labs.    2. Morbid obesity  - Body mass index is 53.93 kg/m².  - obesity can contribute to fatigue.    3. Advance Care Planning     Power of   After our discussion (at previous visit), the patient decided to complete a HCPOA and appointed her  Carmen Qureshi ([phone]), Caitie Sosa ([phone]), and Terrence Dash ([phone]) .      - return to clinic in 4 months with repeat  labs.    Kojo Reaves M.D.  Hematology/Oncology  [facility] - [address], [address]  [place] [address]  Phone: [phone]  Fax: [phone]

## 2022-11-29 LAB
IRON SERPL-MCNC: 56 UG/DL (ref 30–160)
SATURATED IRON: 14 % (ref 20–50)
TOTAL IRON BINDING CAPACITY: 401 UG/DL (ref 250–450)
TRANSFERRIN SERPL-MCNC: 271 MG/DL (ref 200–375)

## 2022-12-01 ENCOUNTER — PATIENT MESSAGE (OUTPATIENT)
Dept: HEMATOLOGY/ONCOLOGY | Facility: CLINIC | Age: 27
End: 2022-12-01
Payer: COMMERCIAL

## 2022-12-06 ENCOUNTER — HOSPITAL ENCOUNTER (OUTPATIENT)
Dept: RADIOLOGY | Facility: HOSPITAL | Age: 27
Discharge: HOME | End: 2022-12-06
Attending: ORTHOPAEDIC SURGERY
Payer: COMMERCIAL

## 2022-12-06 ENCOUNTER — TELEPHONE (OUTPATIENT)
Dept: ORTHOPEDICS | Facility: CLINIC | Age: 27
End: 2022-12-06
Payer: COMMERCIAL

## 2022-12-06 ENCOUNTER — OFFICE VISIT (OUTPATIENT)
Dept: ORTHOPEDICS | Facility: CLINIC | Age: 27
End: 2022-12-06
Payer: COMMERCIAL

## 2022-12-06 VITALS — BODY MASS INDEX: 51.91 KG/M2 | HEIGHT: 63 IN | WEIGHT: 293 LBS

## 2022-12-06 DIAGNOSIS — M54.50 CHRONIC RIGHT-SIDED LOW BACK PAIN WITHOUT SCIATICA: ICD-10-CM

## 2022-12-06 DIAGNOSIS — M51.36 DDD (DEGENERATIVE DISC DISEASE), LUMBAR: ICD-10-CM

## 2022-12-06 DIAGNOSIS — M51.36 DDD (DEGENERATIVE DISC DISEASE), LUMBAR: Primary | ICD-10-CM

## 2022-12-06 DIAGNOSIS — M70.61 TROCHANTERIC BURSITIS, RIGHT HIP: Primary | ICD-10-CM

## 2022-12-06 DIAGNOSIS — G89.29 CHRONIC RIGHT-SIDED LOW BACK PAIN WITHOUT SCIATICA: ICD-10-CM

## 2022-12-06 DIAGNOSIS — M25.559 HIP PAIN: ICD-10-CM

## 2022-12-06 PROCEDURE — 72110 X-RAY EXAM L-2 SPINE 4/>VWS: CPT | Mod: TC

## 2022-12-06 PROCEDURE — 99204 PR OFFICE/OUTPT VISIT, NEW, LEVL IV, 45-59 MIN: ICD-10-PCS | Mod: S$GLB,,, | Performed by: ORTHOPAEDIC SURGERY

## 2022-12-06 PROCEDURE — 3044F PR MOST RECENT HEMOGLOBIN A1C LEVEL <7.0%: ICD-10-PCS | Mod: CPTII,S$GLB,, | Performed by: ORTHOPAEDIC SURGERY

## 2022-12-06 PROCEDURE — 1160F PR REVIEW ALL MEDS BY PRESCRIBER/CLIN PHARMACIST DOCUMENTED: ICD-10-PCS | Mod: CPTII,S$GLB,, | Performed by: ORTHOPAEDIC SURGERY

## 2022-12-06 PROCEDURE — 3008F BODY MASS INDEX DOCD: CPT | Mod: CPTII,S$GLB,, | Performed by: ORTHOPAEDIC SURGERY

## 2022-12-06 PROCEDURE — 3008F PR BODY MASS INDEX (BMI) DOCUMENTED: ICD-10-PCS | Mod: CPTII,S$GLB,, | Performed by: ORTHOPAEDIC SURGERY

## 2022-12-06 PROCEDURE — 1159F PR MEDICATION LIST DOCUMENTED IN MEDICAL RECORD: ICD-10-PCS | Mod: CPTII,S$GLB,, | Performed by: ORTHOPAEDIC SURGERY

## 2022-12-06 PROCEDURE — 99999 PR PBB SHADOW E&M-EST. PATIENT-LVL III: ICD-10-PCS | Mod: PBBFAC,,, | Performed by: ORTHOPAEDIC SURGERY

## 2022-12-06 PROCEDURE — 72110 XR LUMBAR SPINE AP AND LAT WITH FLEX/EXT: ICD-10-PCS | Mod: 26,,, | Performed by: RADIOLOGY

## 2022-12-06 PROCEDURE — 1159F MED LIST DOCD IN RCRD: CPT | Mod: CPTII,S$GLB,, | Performed by: ORTHOPAEDIC SURGERY

## 2022-12-06 PROCEDURE — 3044F HG A1C LEVEL LT 7.0%: CPT | Mod: CPTII,S$GLB,, | Performed by: ORTHOPAEDIC SURGERY

## 2022-12-06 PROCEDURE — 99999 PR PBB SHADOW E&M-EST. PATIENT-LVL III: CPT | Mod: PBBFAC,,, | Performed by: ORTHOPAEDIC SURGERY

## 2022-12-06 PROCEDURE — 1160F RVW MEDS BY RX/DR IN RCRD: CPT | Mod: CPTII,S$GLB,, | Performed by: ORTHOPAEDIC SURGERY

## 2022-12-06 PROCEDURE — 72110 X-RAY EXAM L-2 SPINE 4/>VWS: CPT | Mod: 26,,, | Performed by: RADIOLOGY

## 2022-12-06 PROCEDURE — 99204 OFFICE O/P NEW MOD 45 MIN: CPT | Mod: S$GLB,,, | Performed by: ORTHOPAEDIC SURGERY

## 2022-12-06 RX ORDER — MELOXICAM 15 MG/1
15 TABLET ORAL DAILY
Qty: 60 TABLET | Refills: 1 | Status: SHIPPED | OUTPATIENT
Start: 2022-12-06

## 2022-12-06 NOTE — PROGRESS NOTES
DATE: 12/6/2022  PATIENT: Peyton Qiu    Attending Physician: Jonathon Metzger M.D.    CHIEF COMPLAINT: LBP and R hip pain    HISTORY:  Peyton Qiu is a 27 y.o. female presents for initial evaluation of low back pain (Back - 6). The pain has been present for 8 months (it started when she was pregnant). The patient describes the pain as dull. She also has R lateral hip pain.  The pain is worse with activity and improved by rest. There is no associated numbness and tingling. There is no subjective weakness. Prior treatments have included PT (3 weeks into it), but no meds, NICOLE or surgery.    The Patient denies myelopathic symptoms such as handwriting changes or difficulty with buttons/coins/keys. Denies perineal paresthesias, bowel/bladder dysfunction.    The patient does not smoke, have DM or endorse IVDU. The patient is not on any blood thinners and does not take chronic narcotics. She works as a .    PAST MEDICAL/SURGICAL HISTORY:  Past Medical History:   Diagnosis Date    Eczema     Seasonal allergic rhinitis     Vertigo      Past Surgical History:   Procedure Laterality Date    WISDOM TOOTH EXTRACTION         Current Medications:   Current Outpatient Medications:     ferrous sulfate 325 (65 FE) MG EC tablet, Take 1 tablet (325 mg total) by mouth every other day., Disp: 60 tablet, Rfl: 3    ibuprofen (ADVIL,MOTRIN) 600 MG tablet, Take 1 tablet (600 mg total) by mouth every 6 (six) hours as needed (cramping)., Disp: 30 tablet, Rfl: 0    ketoconazole (NIZORAL) 2 % cream, Apply topically 2 (two) times daily., Disp: 30 g, Rfl: 0    metFORMIN (GLUCOPHAGE-XR) 500 MG ER 24hr tablet, Take 1 tablet (500 mg total) by mouth daily with breakfast., Disp: 90 tablet, Rfl: 3    norethindrone (ORTHO MICRONOR) 0.35 mg tablet, Take 1 tablet (0.35 mg total) by mouth once daily., Disp: 84 tablet, Rfl: 3    prenatal vit no.124/iron/folic (PRENATAL VITAMIN ORAL), Take by mouth., Disp: ,  "Rfl:     triamcinolone acetonide 0.1% (KENALOG) 0.1 % ointment, Apply topically 2 (two) times daily., Disp: 30 g, Rfl: 0    meloxicam (MOBIC) 15 MG tablet, Take 1 tablet (15 mg total) by mouth once daily., Disp: 60 tablet, Rfl: 1    Social History:   Social History     Socioeconomic History    Marital status: Single   Tobacco Use    Smoking status: Never    Smokeless tobacco: Never   Substance and Sexual Activity    Alcohol use: No    Drug use: Never    Sexual activity: Yes     Partners: Male       REVIEW OF SYSTEMS:  Constitution: Negative. Negative for chills, fever and night sweats.   Cardiovascular: Negative for chest pain and syncope.   Respiratory: Negative for cough and shortness of breath.   Gastrointestinal: See HPI. Negative for nausea/vomiting. Negative for abdominal pain.  Genitourinary: See HPI. Negative for discoloration or dysuria.  Skin: Negative for dry skin, itching and rash.   Hematologic/Lymphatic: negative for bleeding/clotting disorders.   Musculoskeletal: Negative for falls and muscle weakness.   Neurological: See HPI. no history of seizures. no history of cranial surgery or shunts.  Endocrine: Negative for polydipsia, polyphagia and polyuria.   Allergic/Immunologic: Negative for hives and persistent infections.    PHYSICAL EXAMINATION:    Ht 5' 3" (1.6 m)   Wt (!) 138.6 kg (305 lb 7.2 oz)   BMI 54.11 kg/m²     General: The patient is a 27 y.o. female in no apparent distress, the patient is orientatied to person, place and time.   Psych: Normal mood and affect  HEENT: Vision grossly intact, hearing intact to the spoken word.  Lungs: Respirations unlabored.  Gait: Normal station and gait, no difficulty with toe or heel walk.   Skin: Dorsal lumbar skin negative for rashes, lesions, hairy patches and surgical scars.  Range of motion: Lumbar range of motion is acceptable. There is right lower lumbar tenderness to palpation. TTP over R GT  Spinal Balance: Global saggital and coronal spinal " balance acceptable, no significant for scoliosis and kyphosis.  Musculoskeletal: No pain with the range of motion of the bilateral hips. No trochanteric tenderness to palpation.  Vascular: Bilateral lower extremities warm and well perfused, Dorsalis pedis pulses 2+ bilaterally.  Neurological: Normal strength and tone in all major motor groups in the bilateral lower extremities. Normal sensation to light touch in the L2-S1 dermatomes bilaterally.  Deep tendon reflexes symmetric 2+ in the bilateral lower extremities.  Negative Babinski bilaterally.    IMAGING:   Today I independently reviewed the following images and my interpretations are as follows:    AP, Lat and Flex/Ex upright L-spine films demonstrate no fractures or listhesis.     Body mass index is 54.11 kg/m².  Hemoglobin A1C   Date Value Ref Range Status   01/20/2022 5.4 4.0 - 5.6 % Final     Comment:     ADA Screening Guidelines:  5.7-6.4%  Consistent with prediabetes  >or=6.5%  Consistent with diabetes    High levels of fetal hemoglobin interfere with the HbA1C  assay. Heterozygous hemoglobin variants (HbS, HgC, etc)do  not significantly interfere with this assay.   However, presence of multiple variants may affect accuracy.     07/13/2011 4.7 4.0 - 6.2 % Final         ASSESSMENT/PLAN:    Peyton was seen today for pain and pain.    Diagnoses and all orders for this visit:    Trochanteric bursitis, right hip  -     meloxicam (MOBIC) 15 MG tablet; Take 1 tablet (15 mg total) by mouth once daily.    Hip pain  -     Ambulatory referral/consult to Orthopedics    Chronic right-sided low back pain without sciatica      Follow up if symptoms worsen or fail to improve.    Patient has R GT bursitis. I discussed the natural history of their diagnoses as well as surgical and nonsurgical treatment options. I educated the patient on the importance of core/back strengthening, correct posture, bending/lifting ergonomics, and low-impact aerobic exercises (walking,  elliptical, and aquatherapy). I prescribed mobic. I will refer the patient to [facility] for R GT injection. Patient will follow up PRN. Next step is a lumbar MRI if pt were to develop worsening LBP and new radiculopathy.    Jonathon Metzger MD  Orthopaedic Spine Surgeon  Department of Orthopaedic Surgery  [phone]

## 2022-12-13 ENCOUNTER — OFFICE VISIT (OUTPATIENT)
Dept: ORTHOPEDICS | Facility: CLINIC | Age: 27
End: 2022-12-13
Payer: COMMERCIAL

## 2022-12-13 ENCOUNTER — HOSPITAL ENCOUNTER (OUTPATIENT)
Dept: RADIOLOGY | Facility: HOSPITAL | Age: 27
Discharge: HOME | End: 2022-12-13
Attending: PHYSICIAN ASSISTANT
Payer: COMMERCIAL

## 2022-12-13 VITALS
BODY MASS INDEX: 51.91 KG/M2 | HEIGHT: 63 IN | WEIGHT: 293 LBS | SYSTOLIC BLOOD PRESSURE: 146 MMHG | DIASTOLIC BLOOD PRESSURE: 99 MMHG | HEART RATE: 99 BPM

## 2022-12-13 DIAGNOSIS — M25.551 HIP PAIN, CHRONIC, RIGHT: ICD-10-CM

## 2022-12-13 DIAGNOSIS — G89.29 HIP PAIN, CHRONIC, RIGHT: Primary | ICD-10-CM

## 2022-12-13 DIAGNOSIS — M25.551 HIP PAIN, CHRONIC, RIGHT: Primary | ICD-10-CM

## 2022-12-13 DIAGNOSIS — G89.29 HIP PAIN, CHRONIC, RIGHT: ICD-10-CM

## 2022-12-13 PROCEDURE — 3044F PR MOST RECENT HEMOGLOBIN A1C LEVEL <7.0%: ICD-10-PCS | Mod: CPTII,S$GLB,, | Performed by: PHYSICIAN ASSISTANT

## 2022-12-13 PROCEDURE — 73502 X-RAY EXAM HIP UNI 2-3 VIEWS: CPT | Mod: TC,RT

## 2022-12-13 PROCEDURE — 3077F SYST BP >= 140 MM HG: CPT | Mod: CPTII,S$GLB,, | Performed by: PHYSICIAN ASSISTANT

## 2022-12-13 PROCEDURE — 3044F HG A1C LEVEL LT 7.0%: CPT | Mod: CPTII,S$GLB,, | Performed by: PHYSICIAN ASSISTANT

## 2022-12-13 PROCEDURE — 1159F PR MEDICATION LIST DOCUMENTED IN MEDICAL RECORD: ICD-10-PCS | Mod: CPTII,S$GLB,, | Performed by: PHYSICIAN ASSISTANT

## 2022-12-13 PROCEDURE — 3008F BODY MASS INDEX DOCD: CPT | Mod: CPTII,S$GLB,, | Performed by: PHYSICIAN ASSISTANT

## 2022-12-13 PROCEDURE — 3077F PR MOST RECENT SYSTOLIC BLOOD PRESSURE >= 140 MM HG: ICD-10-PCS | Mod: CPTII,S$GLB,, | Performed by: PHYSICIAN ASSISTANT

## 2022-12-13 PROCEDURE — 73502 XR HIP WITH PELVIS WHEN PERFORMED, 2 OR 3  VIEWS RIGHT: ICD-10-PCS | Mod: 26,RT,, | Performed by: RADIOLOGY

## 2022-12-13 PROCEDURE — 1159F MED LIST DOCD IN RCRD: CPT | Mod: CPTII,S$GLB,, | Performed by: PHYSICIAN ASSISTANT

## 2022-12-13 PROCEDURE — 3008F PR BODY MASS INDEX (BMI) DOCUMENTED: ICD-10-PCS | Mod: CPTII,S$GLB,, | Performed by: PHYSICIAN ASSISTANT

## 2022-12-13 PROCEDURE — 99499 NO LOS: ICD-10-PCS | Mod: S$GLB,,, | Performed by: PHYSICIAN ASSISTANT

## 2022-12-13 PROCEDURE — 99499 UNLISTED E&M SERVICE: CPT | Mod: S$GLB,,, | Performed by: PHYSICIAN ASSISTANT

## 2022-12-13 PROCEDURE — 3080F PR MOST RECENT DIASTOLIC BLOOD PRESSURE >= 90 MM HG: ICD-10-PCS | Mod: CPTII,S$GLB,, | Performed by: PHYSICIAN ASSISTANT

## 2022-12-13 PROCEDURE — 73502 X-RAY EXAM HIP UNI 2-3 VIEWS: CPT | Mod: 26,RT,, | Performed by: RADIOLOGY

## 2022-12-13 PROCEDURE — 3080F DIAST BP >= 90 MM HG: CPT | Mod: CPTII,S$GLB,, | Performed by: PHYSICIAN ASSISTANT

## 2023-01-23 ENCOUNTER — PATIENT MESSAGE (OUTPATIENT)
Dept: OBSTETRICS AND GYNECOLOGY | Facility: CLINIC | Age: 28
End: 2023-01-23
Payer: COMMERCIAL

## 2023-01-23 ENCOUNTER — PATIENT MESSAGE (OUTPATIENT)
Dept: ORTHOPEDICS | Facility: CLINIC | Age: 28
End: 2023-01-23
Payer: COMMERCIAL

## 2023-01-26 NOTE — PROGRESS NOTES
No c/o today   FHT normal   Cervix 1/t/h  @  36 3/7 wga  1. iob reviewed; enrolled in connected moms; declines covid and flu vaccine; MT 21 negative; anatomy incomplete so repeat done today, still limited, will repeat at 32 wga; DM test normal; tdap done; 3rd trim labs done    2. Anemia: on iron   3. Obesity: did targeted US.  Early GTT normal;   -Will start  testing since BMI >45. At 34 weeks  - on ASA    -breast feeding, discussed how to get pump and latch and holds  -peds: darvin marcus   - no birth plan and desires epidural  -considering IOL, discussed long process of IOL, discuss more next visit           Area L Indication Text: Tumors in this location are included in Area L (trunk and extremities).  Mohs surgery is indicated for larger tumors, or tumors with aggressive histologic features, in these anatomic locations.

## 2023-03-17 ENCOUNTER — PATIENT MESSAGE (OUTPATIENT)
Dept: RESEARCH | Facility: HOSPITAL | Age: 28
End: 2023-03-17
Payer: COMMERCIAL

## 2023-06-08 ENCOUNTER — OFFICE VISIT (OUTPATIENT)
Dept: HEMATOLOGY/ONCOLOGY | Facility: CLINIC | Age: 28
End: 2023-06-08
Payer: COMMERCIAL

## 2023-06-08 DIAGNOSIS — Z91.199 NO-SHOW FOR APPOINTMENT: Primary | ICD-10-CM

## 2023-06-08 PROCEDURE — 99499 UNLISTED E&M SERVICE: CPT | Mod: 95,,, | Performed by: NURSE PRACTITIONER

## 2023-06-08 PROCEDURE — 99499 NO LOS: ICD-10-PCS | Mod: 95,,, | Performed by: NURSE PRACTITIONER

## 2023-06-08 NOTE — PROGRESS NOTES
PATIENT: Peyton Steinbergwarrenshilpi Qiu  MRN: [id number]  DATE: 6/8/2023    No show for 6/7/23 labs, no show for online follow up appt

## 2023-06-11 ENCOUNTER — OFFICE VISIT (OUTPATIENT)
Dept: URGENT CARE | Facility: CLINIC | Age: 28
End: 2023-06-11
Payer: COMMERCIAL

## 2023-06-11 VITALS
OXYGEN SATURATION: 98 % | WEIGHT: 293 LBS | BODY MASS INDEX: 51.91 KG/M2 | RESPIRATION RATE: 18 BRPM | HEART RATE: 94 BPM | DIASTOLIC BLOOD PRESSURE: 84 MMHG | SYSTOLIC BLOOD PRESSURE: 135 MMHG | TEMPERATURE: 98 F | HEIGHT: 63 IN

## 2023-06-11 DIAGNOSIS — R19.7 DIARRHEA, UNSPECIFIED TYPE: Primary | ICD-10-CM

## 2023-06-11 DIAGNOSIS — R11.2 NAUSEA AND VOMITING, UNSPECIFIED VOMITING TYPE: ICD-10-CM

## 2023-06-11 LAB
CTP QC/QA: YES
SARS-COV-2 AG RESP QL IA.RAPID: NEGATIVE

## 2023-06-11 PROCEDURE — 87811 SARS-COV-2 COVID19 W/OPTIC: CPT | Mod: QW,S$GLB,, | Performed by: FAMILY MEDICINE

## 2023-06-11 PROCEDURE — 99213 PR OFFICE/OUTPT VISIT, EST, LEVL III, 20-29 MIN: ICD-10-PCS | Mod: S$GLB,,, | Performed by: FAMILY MEDICINE

## 2023-06-11 PROCEDURE — 87811 SARS CORONAVIRUS 2 ANTIGEN POCT, MANUAL READ: ICD-10-PCS | Mod: QW,S$GLB,, | Performed by: FAMILY MEDICINE

## 2023-06-11 PROCEDURE — 99213 OFFICE O/P EST LOW 20 MIN: CPT | Mod: S$GLB,,, | Performed by: FAMILY MEDICINE

## 2023-06-11 RX ORDER — ONDANSETRON 4 MG/1
4 TABLET, ORALLY DISINTEGRATING ORAL EVERY 6 HOURS PRN
Qty: 15 TABLET | Refills: 0 | OUTPATIENT
Start: 2023-06-11 | End: 2024-03-30

## 2023-06-11 NOTE — PROGRESS NOTES
"Subjective:      Patient ID: Peyton Qiu is a 27 y.o. female.    Vitals:  height is 5' 3" (1.6 m) and weight is 143.3 kg (316 lb) (abnormal). Her temperature is 98.1 °F (36.7 °C). Her blood pressure is 135/84 and her pulse is 94. Her respiration is 18 and oxygen saturation is 98%.     Chief Complaint: Emesis (Diarrhea abdominal pain)    27 yr female present with vomiting Diarrhea abdominal pain. Pt states that last night she was vomiting and having diarrhea at the same time. Since this morning but she is just having a little nausea but her symptoms have subsided. Pt states that last week her son has tested positive for covid.last week, feeling better now  She denies uri sx  No unusual food, denies abdominal pain,  Pt is not vaccinated      Emesis   This is a new problem. The current episode started yesterday. The problem has been gradually worsening. There has been no fever. Associated symptoms include abdominal pain and diarrhea. She has tried nothing for the symptoms. The treatment provided no relief.     Gastrointestinal:  Positive for abdominal pain, vomiting and diarrhea.    Objective:     Physical Exam   Constitutional: She is oriented to person, place, and time. She appears well-developed. She is cooperative.   HENT:   Head: Normocephalic and atraumatic.   Ears:   Right Ear: Hearing, tympanic membrane, external ear and ear canal normal.   Left Ear: Hearing, tympanic membrane, external ear and ear canal normal.   Nose: Nose normal. No mucosal edema or nasal deformity. No epistaxis. Right sinus exhibits no maxillary sinus tenderness and no frontal sinus tenderness. Left sinus exhibits no maxillary sinus tenderness and no frontal sinus tenderness.   Mouth/Throat: Uvula is midline, oropharynx is clear and moist and mucous membranes are normal. Mucous membranes are moist. No trismus in the jaw. Normal dentition. No uvula swelling. Oropharynx is clear.   Eyes: Conjunctivae and lids are normal. " Pupils are equal, round, and reactive to light. Extraocular movement intact   Neck: Trachea normal and phonation normal. Neck supple.   Cardiovascular: Normal rate, regular rhythm, normal heart sounds and normal pulses.   Pulmonary/Chest: Effort normal and breath sounds normal.   Abdominal: Normal appearance and bowel sounds are normal. She exhibits no distension and no mass. Soft. There is no abdominal tenderness. There is no rebound and no guarding. No hernia.   Musculoskeletal: Normal range of motion.         General: Normal range of motion.   Neurological: She is alert and oriented to person, place, and time. She exhibits normal muscle tone.   Skin: Skin is warm, dry and intact.   Psychiatric: Her speech is normal and behavior is normal. Judgment and thought content normal.   Nursing note and vitals reviewed.    Assessment:     1. Diarrhea, unspecified type    2. Nausea and vomiting, unspecified vomiting type        Plan:       Diarrhea, unspecified type  -     SARS Coronavirus 2 Antigen, POCT Manual Read    Nausea and vomiting, unspecified vomiting type          Blend diet    Reassurance    Advised vaccinated after 4 weeks        Results for orders placed or performed in visit on 06/11/23   SARS Coronavirus 2 Antigen, POCT Manual Read   Result Value Ref Range    SARS Coronavirus 2 Antigen Negative Negative     Acceptable Yes

## 2023-06-11 NOTE — LETTER
June 11, 2023      [facility]  [address] [address]  Phone: [phone]  Fax: [phone]       Patient: Peyton Qiu   YOB: 1995  Date of Visit: 06/11/2023    To Whom It May Concern:    Mehnaz Qiu  was at [facility] on 06/11/2023. The patient may return to work/school on 6/13/23   with no restrictions. If you have any questions or concerns, or if I can be of further assistance, please do not hesitate to contact me.    Sincerely,    Saman Terry MD

## 2023-06-12 ENCOUNTER — TELEPHONE (OUTPATIENT)
Dept: URGENT CARE | Facility: CLINIC | Age: 28
End: 2023-06-12
Payer: MEDICAID

## 2023-06-15 ENCOUNTER — OFFICE VISIT (OUTPATIENT)
Dept: URGENT CARE | Facility: CLINIC | Age: 28
End: 2023-06-15
Payer: COMMERCIAL

## 2023-06-15 VITALS
OXYGEN SATURATION: 98 % | TEMPERATURE: 98 F | WEIGHT: 293 LBS | RESPIRATION RATE: 20 BRPM | SYSTOLIC BLOOD PRESSURE: 133 MMHG | HEART RATE: 84 BPM | DIASTOLIC BLOOD PRESSURE: 92 MMHG | BODY MASS INDEX: 55.98 KG/M2

## 2023-06-15 DIAGNOSIS — R19.7 DIARRHEA, UNSPECIFIED TYPE: ICD-10-CM

## 2023-06-15 DIAGNOSIS — R11.10 VOMITING, UNSPECIFIED VOMITING TYPE, UNSPECIFIED WHETHER NAUSEA PRESENT: Primary | ICD-10-CM

## 2023-06-15 LAB
CTP QC/QA: YES
CTP QC/QA: YES
POC MOLECULAR INFLUENZA A AGN: NEGATIVE
POC MOLECULAR INFLUENZA B AGN: NEGATIVE
SARS-COV-2 AG RESP QL IA.RAPID: NEGATIVE

## 2023-06-15 PROCEDURE — 87502 INFLUENZA DNA AMP PROBE: CPT | Mod: QW,S$GLB,, | Performed by: FAMILY MEDICINE

## 2023-06-15 PROCEDURE — 87811 SARS-COV-2 COVID19 W/OPTIC: CPT | Mod: QW,S$GLB,, | Performed by: FAMILY MEDICINE

## 2023-06-15 PROCEDURE — 99213 OFFICE O/P EST LOW 20 MIN: CPT | Mod: S$GLB,,, | Performed by: FAMILY MEDICINE

## 2023-06-15 PROCEDURE — 87811 SARS CORONAVIRUS 2 ANTIGEN POCT, MANUAL READ: ICD-10-PCS | Mod: QW,S$GLB,, | Performed by: FAMILY MEDICINE

## 2023-06-15 PROCEDURE — 87502 POCT INFLUENZA A/B MOLECULAR: ICD-10-PCS | Mod: QW,S$GLB,, | Performed by: FAMILY MEDICINE

## 2023-06-15 PROCEDURE — 99213 PR OFFICE/OUTPT VISIT, EST, LEVL III, 20-29 MIN: ICD-10-PCS | Mod: S$GLB,,, | Performed by: FAMILY MEDICINE

## 2023-06-15 RX ORDER — SEMAGLUTIDE 0.68 MG/ML
INJECTION, SOLUTION SUBCUTANEOUS
COMMUNITY
Start: 2023-05-02

## 2023-06-15 NOTE — PROGRESS NOTES
Subjective:      Patient ID: Peyton Qiu is a 27 y.o. female.    Vitals:  weight is 143.3 kg (316 lb) (abnormal). Her temperature is 98 °F (36.7 °C). Her blood pressure is 133/92 (abnormal) and her pulse is 84. Her respiration is 20 and oxygen saturation is 98%.     Chief Complaint: Diarrhea    Pt is complaining of diarrhea that started Sunday. She also had vomiting, she thought she had stomach bug, but her baby tested positive for covid and flu on Thursday. She wants to be reswabbed.   Pt was exposed to covid and concerned  Diarrhea is almost resolved, no vomiting      Diarrhea   This is a new problem. The current episode started in the past 7 days. Associated symptoms include bloating and vomiting. Pertinent negatives include no abdominal pain, arthralgias, chills, coughing, fever, headaches, increased  flatus, myalgias, sweats, URI or weight loss.     Constitution: Negative for chills and fever.   Respiratory:  Negative for cough.    Gastrointestinal:  Positive for vomiting and diarrhea. Negative for abdominal pain.   Musculoskeletal:  Negative for joint pain and muscle ache.   Neurological:  Negative for headaches.    Objective:     Physical Exam   Constitutional: She is oriented to person, place, and time. She appears well-developed. She is cooperative. obesity  HENT:   Head: Normocephalic and atraumatic.   Ears:   Right Ear: Hearing, tympanic membrane, external ear and ear canal normal.   Left Ear: Hearing, tympanic membrane, external ear and ear canal normal.   Nose: Nose normal. No mucosal edema or nasal deformity. No epistaxis. Right sinus exhibits no maxillary sinus tenderness and no frontal sinus tenderness. Left sinus exhibits no maxillary sinus tenderness and no frontal sinus tenderness.   Mouth/Throat: Uvula is midline, oropharynx is clear and moist and mucous membranes are normal. Mucous membranes are moist. No trismus in the jaw. Normal dentition. No uvula swelling. Oropharynx is  clear.   Eyes: Conjunctivae and lids are normal. Pupils are equal, round, and reactive to light. Extraocular movement intact   Neck: Trachea normal and phonation normal. Neck supple.   Cardiovascular: Normal rate, regular rhythm, normal heart sounds and normal pulses.   Pulmonary/Chest: Effort normal and breath sounds normal.   Abdominal: Normal appearance and bowel sounds are normal. Soft.   Musculoskeletal: Normal range of motion.         General: Normal range of motion.   Neurological: She is alert and oriented to person, place, and time. She exhibits normal muscle tone.   Skin: Skin is warm, dry and intact.   Psychiatric: Her speech is normal and behavior is normal. Judgment and thought content normal.   Nursing note and vitals reviewed.    Assessment:     1. Vomiting, unspecified vomiting type, unspecified whether nausea present    2. Diarrhea, unspecified type        Plan:       Vomiting, unspecified vomiting type, unspecified whether nausea present  -     SARS Coronavirus 2 Antigen, POCT Manual Read  -     POCT Influenza A/B MOLECULAR    Diarrhea, unspecified type            Fluids    Reassurance       Results for orders placed or performed in visit on 06/15/23   SARS Coronavirus 2 Antigen, POCT Manual Read   Result Value Ref Range    SARS Coronavirus 2 Antigen Negative Negative     Acceptable Yes    POCT Influenza A/B MOLECULAR   Result Value Ref Range    POC Molecular Influenza A Ag Negative Negative, Not Reported    POC Molecular Influenza B Ag Negative Negative, Not Reported     Acceptable Yes         Blend diet

## 2023-06-15 NOTE — LETTER
Brianna 15, 2023      [facility]  [address] [address]  Phone: [phone]  Fax: [phone]       Patient: Peyton Qiu   YOB: 1995  Date of Visit: 06/15/2023    To Whom It May Concern:    Mehnaz Qiu  was at [facility] on 06/15/2023. The patient may return to work/school on 6/16/23   with no restrictions. If you have any questions or concerns, or if I can be of further assistance, please do not hesitate to contact me.    Sincerely,    Saman Terry MD

## 2023-07-24 PROBLEM — Z37.9 VACUUM-ASSISTED VAGINAL DELIVERY: Status: RESOLVED | Noted: 2022-08-11 | Resolved: 2023-07-24

## 2023-10-08 ENCOUNTER — PATIENT MESSAGE (OUTPATIENT)
Dept: OBSTETRICS AND GYNECOLOGY | Facility: CLINIC | Age: 28
End: 2023-10-08
Payer: COMMERCIAL

## 2023-10-08 DIAGNOSIS — N93.9 ABNORMAL UTERINE BLEEDING (AUB): Primary | ICD-10-CM

## 2023-10-13 ENCOUNTER — TELEPHONE (OUTPATIENT)
Dept: OBSTETRICS AND GYNECOLOGY | Facility: CLINIC | Age: 28
End: 2023-10-13
Payer: COMMERCIAL

## 2023-10-13 RX ORDER — NORGESTIMATE AND ETHINYL ESTRADIOL 0.25-0.035
1 KIT ORAL DAILY
Qty: 84 TABLET | Refills: 3 | Status: SHIPPED | OUTPATIENT
Start: 2023-10-13 | End: 2023-10-13 | Stop reason: SDUPTHER

## 2023-10-13 RX ORDER — NORETHINDRONE ACETATE AND ETHINYL ESTRADIOL .02; 1 MG/1; MG/1
TABLET ORAL
Qty: 28 TABLET | Refills: 3 | Status: SHIPPED | OUTPATIENT
Start: 2023-10-13 | End: 2023-10-13 | Stop reason: SDUPTHER

## 2023-10-13 RX ORDER — NORETHINDRONE ACETATE AND ETHINYL ESTRADIOL .02; 1 MG/1; MG/1
TABLET ORAL
Qty: 28 TABLET | Refills: 3 | Status: SHIPPED | OUTPATIENT
Start: 2023-10-13 | End: 2024-03-19 | Stop reason: SDUPTHER

## 2023-10-13 RX ORDER — NORGESTIMATE AND ETHINYL ESTRADIOL 0.25-0.035
1 KIT ORAL DAILY
Qty: 84 TABLET | Refills: 3 | Status: SHIPPED | OUTPATIENT
Start: 2023-10-13 | End: 2024-10-12

## 2023-10-13 RX ORDER — ONDANSETRON 4 MG/1
4 TABLET, ORALLY DISINTEGRATING ORAL EVERY 6 HOURS PRN
Qty: 20 TABLET | Refills: 1 | Status: SHIPPED | OUTPATIENT
Start: 2023-10-13 | End: 2023-11-12

## 2023-10-13 NOTE — TELEPHONE ENCOUNTER
----- Message from Tad Goncalves sent at 10/13/2023 11:07 AM CDT -----  Type:  Pharmacy Calling to Clarify an RX    Name of Caller:jemima   Pharmacy Name:[facility]  Prescription Name:norethindrone-ethinyl estradiol (MICROGESTIN 1/20) 1-20 mg-mcg per tablet  What do they need to clarify?:would like to know if she is starting with this medication and switching over to sprintec  Best Call Back Number:[phone] fax    Additional Information:

## 2023-10-13 NOTE — TELEPHONE ENCOUNTER
You can do a pill cascade (norethindrone-ethinyl estradiol (MICROGESTIN 1/20) 1-20 mg-mcg) on a low dose pill where you take a pill three times a daily for 1-2 days then twice a daily for 4 days or until the bleeding stops but not longer than a pack then go down to once a day. I sent in a different pill that you can start after that norgestimate-ethinyl estradioL (ORTHO-CYCLEN) 0.25-35 mg-mcg

## 2023-10-17 ENCOUNTER — LAB VISIT (OUTPATIENT)
Dept: LAB | Facility: HOSPITAL | Age: 28
End: 2023-10-17
Attending: OBSTETRICS & GYNECOLOGY
Payer: COMMERCIAL

## 2023-10-17 DIAGNOSIS — N93.9 ABNORMAL UTERINE BLEEDING (AUB): ICD-10-CM

## 2023-10-17 LAB
BASOPHILS # BLD AUTO: 0.07 K/UL (ref 0–0.2)
BASOPHILS NFR BLD: 0.7 % (ref 0–1.9)
DIFFERENTIAL METHOD: ABNORMAL
EOSINOPHIL # BLD AUTO: 0.3 K/UL (ref 0–0.5)
EOSINOPHIL NFR BLD: 2.9 % (ref 0–8)
ERYTHROCYTE [DISTWIDTH] IN BLOOD BY AUTOMATED COUNT: 15.2 % (ref 11.5–14.5)
HCT VFR BLD AUTO: 29.9 % (ref 37–48.5)
HGB BLD-MCNC: 9.3 G/DL (ref 12–16)
IMM GRANULOCYTES # BLD AUTO: 0.12 K/UL (ref 0–0.04)
IMM GRANULOCYTES NFR BLD AUTO: 1.3 % (ref 0–0.5)
LYMPHOCYTES # BLD AUTO: 2.8 K/UL (ref 1–4.8)
LYMPHOCYTES NFR BLD: 29.9 % (ref 18–48)
MCH RBC QN AUTO: 25.6 PG (ref 27–31)
MCHC RBC AUTO-ENTMCNC: 31.1 G/DL (ref 32–36)
MCV RBC AUTO: 82 FL (ref 82–98)
MONOCYTES # BLD AUTO: 0.6 K/UL (ref 0.3–1)
MONOCYTES NFR BLD: 6 % (ref 4–15)
NEUTROPHILS # BLD AUTO: 5.6 K/UL (ref 1.8–7.7)
NEUTROPHILS NFR BLD: 59.2 % (ref 38–73)
NRBC BLD-RTO: 1 /100 WBC
PLATELET # BLD AUTO: 505 K/UL (ref 150–450)
PMV BLD AUTO: 8.6 FL (ref 9.2–12.9)
RBC # BLD AUTO: 3.63 M/UL (ref 4–5.4)
WBC # BLD AUTO: 9.45 K/UL (ref 3.9–12.7)

## 2023-10-17 PROCEDURE — 36415 COLL VENOUS BLD VENIPUNCTURE: CPT | Performed by: OBSTETRICS & GYNECOLOGY

## 2023-10-17 PROCEDURE — 85025 COMPLETE CBC W/AUTO DIFF WBC: CPT | Performed by: OBSTETRICS & GYNECOLOGY

## 2023-11-28 ENCOUNTER — TELEPHONE (OUTPATIENT)
Dept: OBSTETRICS AND GYNECOLOGY | Facility: CLINIC | Age: 28
End: 2023-11-28
Payer: COMMERCIAL

## 2023-11-28 DIAGNOSIS — N93.9 ABNORMAL UTERINE BLEEDING (AUB): Primary | ICD-10-CM

## 2023-11-28 NOTE — TELEPHONE ENCOUNTER
Called pt regarding bleeding she said she has been bleeding for 2 months now. She just started the birth control last month which has caused the bleeding to become lighter but not stop completely. She hasn't missed a pill or taken anything incorrectly please advise on next steps or if she just needs to give the birth control time to work.

## 2023-11-28 NOTE — TELEPHONE ENCOUNTER
Unfortunately irregular bleeding can be normal for a few months after starting birth control. I would recommend doing an ultrasound and following up with us in the office so we can do an exam and discuss other choices. Looks like she is due for an annual anyway.

## 2023-11-29 ENCOUNTER — TELEPHONE (OUTPATIENT)
Dept: OBSTETRICS AND GYNECOLOGY | Facility: CLINIC | Age: 28
End: 2023-11-29
Payer: COMMERCIAL

## 2023-11-30 NOTE — PROGRESS NOTES
PATIENT: Peyton Qiu  MRN: [id number]  DATE: 12/1/2023    Diagnosis:   1. Iron deficiency anemia secondary to blood loss (chronic)    2. Reactive thrombocytosis    3. Morbid obesity    4. Abnormal uterine bleeding      Chief Complaint: Anemia    Subjective:    History of Present Illness: Ms. Qiu is a 28 y.o. female who presented in June 2022 for evaluation and management of iron deficiency anemia. She was referred by Dr. Gomes.    - labs since 2021 reveal a worsening microcytic anemia.  - she endorses a history of menorrhagia before she became pregnant.  - she has been taking oral iron with vitamin C. She denies side effects from oral iron.    - she received iron sucrose x 4 doses in June - August 2022.  - she gave birth to on 8/11/22    Interval history:  - she presents for a follow-up appointment for her iron deficiency anemia.  - today, she endorses fatigue, abdominal cramping, ice craving.. She endorses menorrhagia. She has been having a period for a month.  - She denies chest pain, nausea, vomiting, diarrhea, constipation.  - she is taking oral iron.        Past medical, surgical, family, and social histories have been reviewed and updated below.    Past Medical History:   Past Medical History:   Diagnosis Date    Eczema     Seasonal allergic rhinitis     Vertigo        Past Surgical History:   Past Surgical History:   Procedure Laterality Date    WISDOM TOOTH EXTRACTION         Family History:   Family History   Problem Relation Age of Onset    Coronary artery disease Father     Diabetes Maternal Grandmother     Heart disease Maternal Grandmother     No Known Problems Mother        Social History:  reports that she has never smoked. She has never used smokeless tobacco. She reports that she does not drink alcohol and does not use drugs.    Allergies:  Review of patient's allergies indicates:   Allergen Reactions    Augmentin [amoxicillin-pot clavulanate] Rash    Betamethasone, augmented  Diarrhea, Hives, Nausea And Vomiting and Rash       Medications:  Current Outpatient Medications   Medication Sig Dispense Refill    ferrous sulfate 325 (65 FE) MG EC tablet Take 1 tablet (325 mg total) by mouth every other day. (Patient not taking: Reported on 6/11/2023) 60 tablet 3    ibuprofen (ADVIL,MOTRIN) 600 MG tablet Take 1 tablet (600 mg total) by mouth every 6 (six) hours as needed (cramping). (Patient not taking: Reported on 6/11/2023) 30 tablet 0    ketoconazole (NIZORAL) 2 % cream Apply topically 2 (two) times daily. (Patient not taking: Reported on 6/11/2023) 30 g 0    meloxicam (MOBIC) 15 MG tablet Take 1 tablet (15 mg total) by mouth once daily. (Patient not taking: Reported on 6/11/2023) 60 tablet 1    metFORMIN (GLUCOPHAGE-XR) 500 MG ER 24hr tablet Take 1 tablet (500 mg total) by mouth daily with breakfast. 90 tablet 3    norethindrone (ORTHO MICRONOR) 0.35 mg tablet Take 1 tablet (0.35 mg total) by mouth once daily. (Patient not taking: Reported on 6/11/2023) 84 tablet 3    norethindrone-ethinyl estradiol (MICROGESTIN 1/20) 1-20 mg-mcg per tablet Pill cascade: Take one pill three times a day for two days then one pill twice a daily for 4 days then daily 28 tablet 3    norgestimate-ethinyl estradioL (ORTHO-CYCLEN) 0.25-35 mg-mcg per tablet Take 1 tablet by mouth once daily. 84 tablet 3    ondansetron (ZOFRAN-ODT) 4 MG TbDL Take 1 tablet (4 mg total) by mouth every 6 (six) hours as needed. 15 tablet 0    OZEMPIC 0.25 mg or 0.5 mg (2 mg/3 mL) pen injector Inject into the skin.      prenatal vit no.124/iron/folic (PRENATAL VITAMIN ORAL) Take by mouth.      triamcinolone acetonide 0.1% (KENALOG) 0.1 % ointment Apply topically 2 (two) times daily. (Patient not taking: Reported on 6/11/2023) 30 g 0     No current facility-administered medications for this visit.       Review of Systems   Constitutional:  Positive for fatigue.   HENT:  Negative for sore throat.    Eyes:  Negative for visual disturbance.  "  Respiratory:  Positive for shortness of breath. Negative for cough.    Cardiovascular:  Negative for chest pain.   Gastrointestinal:  Negative for abdominal pain, constipation, diarrhea, nausea and vomiting.   Genitourinary:  Negative for dysuria.   Musculoskeletal:  Negative for back pain.   Skin:  Negative for rash.   Neurological:  Negative for headaches.   Hematological:  Negative for adenopathy.   Psychiatric/Behavioral:  The patient is not nervous/anxious.        ECOG Performance Status:   ECOG SCORE 1            Objective:      Vitals:   Vitals:    12/01/23 0924   BP: 127/69   BP Location: Right arm   Patient Position: Sitting   BP Method: Large (Automatic)   Pulse: 96   Resp: 16   SpO2: 97%   Weight: (!) 145.5 kg (320 lb 12.3 oz)   Height: 5' 3" (1.6 m)     BMI: Body mass index is 56.82 kg/m².    Physical Exam  Vitals and nursing note reviewed.   Constitutional:       Appearance: She is well-developed.   HENT:      Head: Normocephalic and atraumatic.   Eyes:      Pupils: Pupils are equal, round, and reactive to light.   Cardiovascular:      Rate and Rhythm: Normal rate and regular rhythm.   Pulmonary:      Effort: Pulmonary effort is normal.      Breath sounds: Normal breath sounds.   Abdominal:      General: Bowel sounds are normal.      Palpations: Abdomen is soft.   Musculoskeletal:         General: Normal range of motion.      Cervical back: Normal range of motion and neck supple.   Skin:     General: Skin is warm and dry.   Neurological:      Mental Status: She is alert and oriented to person, place, and time.   Psychiatric:         Behavior: Behavior normal.         Thought Content: Thought content normal.         Judgment: Judgment normal.         Laboratory Data:  Labs have been reviewed.    Lab Results   Component Value Date    WBC 9.45 10/17/2023    HGB 9.3 (L) 10/17/2023    HCT 29.9 (L) 10/17/2023    MCV 82 10/17/2023     (H) 10/17/2023         10/31/23:        Imaging:    Assessment:     "   1. Iron deficiency anemia secondary to blood loss (chronic)    2. Reactive thrombocytosis    3. Morbid obesity           Plan:     1. Iron deficiency anemia  - I have reviewed her chart  - labs since 2021 reveal a worsening microcytic anemia.  - labs in June 2022 confirmed iron deficiency anemia  - she received iron sucrose x 4 doses in June - August 2022.  - labs in October 2023 reveal a moderate anemia with reactive thrombocytosis  - cause is menorrhagia. She has a period continuously for the past month.  - outside labs (10/31/23) revealed a ferritin at 4 ng/mL, consistent with iron deficiency anemia.  - she has recurrent iron deficiency anemia.  - I recommend ferric carboxymaltose x 2 doses  - continue oral iron  - return to clinic in 4 months with repeat labs.    2. Morbid obesity  - Body mass index is 56.82 kg/m².  - obesity can contribute to fatigue.    3. Advance Care Planning     Power of   After our discussion (at previous visit), the patient decided to complete a HCPOA and appointed her  Carmen Qureshi ([phone]), Caitie Sosa ([phone]), and Terrence Dash ([phone]) .      - return to clinic in 4 months with repeat labs.    Kojo Reaves M.D.  Hematology/Oncology  [facility] - [address], [address]  [place] [address]  Phone: [phone]  Fax: [phone]

## 2023-12-01 ENCOUNTER — HOSPITAL ENCOUNTER (OUTPATIENT)
Dept: RADIOLOGY | Facility: HOSPITAL | Age: 28
Discharge: HOME | End: 2023-12-01
Attending: OBSTETRICS & GYNECOLOGY
Payer: COMMERCIAL

## 2023-12-01 ENCOUNTER — OFFICE VISIT (OUTPATIENT)
Dept: HEMATOLOGY/ONCOLOGY | Facility: CLINIC | Age: 28
End: 2023-12-01
Payer: COMMERCIAL

## 2023-12-01 VITALS
DIASTOLIC BLOOD PRESSURE: 69 MMHG | WEIGHT: 293 LBS | HEIGHT: 63 IN | HEART RATE: 96 BPM | OXYGEN SATURATION: 97 % | RESPIRATION RATE: 16 BRPM | BODY MASS INDEX: 51.91 KG/M2 | SYSTOLIC BLOOD PRESSURE: 127 MMHG

## 2023-12-01 DIAGNOSIS — N93.9 ABNORMAL UTERINE BLEEDING: ICD-10-CM

## 2023-12-01 DIAGNOSIS — E66.01 MORBID OBESITY: ICD-10-CM

## 2023-12-01 DIAGNOSIS — N93.9 ABNORMAL UTERINE BLEEDING (AUB): ICD-10-CM

## 2023-12-01 DIAGNOSIS — D50.0 IRON DEFICIENCY ANEMIA SECONDARY TO BLOOD LOSS (CHRONIC): Primary | ICD-10-CM

## 2023-12-01 DIAGNOSIS — D75.838 REACTIVE THROMBOCYTOSIS: ICD-10-CM

## 2023-12-01 PROCEDURE — 1160F PR REVIEW ALL MEDS BY PRESCRIBER/CLIN PHARMACIST DOCUMENTED: ICD-10-PCS | Mod: CPTII,S$GLB,, | Performed by: INTERNAL MEDICINE

## 2023-12-01 PROCEDURE — 3078F DIAST BP <80 MM HG: CPT | Mod: CPTII,S$GLB,, | Performed by: INTERNAL MEDICINE

## 2023-12-01 PROCEDURE — 3008F BODY MASS INDEX DOCD: CPT | Mod: CPTII,S$GLB,, | Performed by: INTERNAL MEDICINE

## 2023-12-01 PROCEDURE — 76830 TRANSVAGINAL US NON-OB: CPT | Mod: 26,,, | Performed by: INTERNAL MEDICINE

## 2023-12-01 PROCEDURE — 99214 PR OFFICE/OUTPT VISIT, EST, LEVL IV, 30-39 MIN: ICD-10-PCS | Mod: S$GLB,,, | Performed by: INTERNAL MEDICINE

## 2023-12-01 PROCEDURE — 76856 US PELVIS COMP WITH TRANSVAG NON-OB (XPD): ICD-10-PCS | Mod: 26,,, | Performed by: INTERNAL MEDICINE

## 2023-12-01 PROCEDURE — 1159F PR MEDICATION LIST DOCUMENTED IN MEDICAL RECORD: ICD-10-PCS | Mod: CPTII,S$GLB,, | Performed by: INTERNAL MEDICINE

## 2023-12-01 PROCEDURE — 76856 US EXAM PELVIC COMPLETE: CPT | Mod: 26,,, | Performed by: INTERNAL MEDICINE

## 2023-12-01 PROCEDURE — 99999 PR PBB SHADOW E&M-EST. PATIENT-LVL IV: CPT | Mod: PBBFAC,,, | Performed by: INTERNAL MEDICINE

## 2023-12-01 PROCEDURE — 3074F SYST BP LT 130 MM HG: CPT | Mod: CPTII,S$GLB,, | Performed by: INTERNAL MEDICINE

## 2023-12-01 PROCEDURE — 99999 PR PBB SHADOW E&M-EST. PATIENT-LVL IV: ICD-10-PCS | Mod: PBBFAC,,, | Performed by: INTERNAL MEDICINE

## 2023-12-01 PROCEDURE — 1160F RVW MEDS BY RX/DR IN RCRD: CPT | Mod: CPTII,S$GLB,, | Performed by: INTERNAL MEDICINE

## 2023-12-01 PROCEDURE — 1159F MED LIST DOCD IN RCRD: CPT | Mod: CPTII,S$GLB,, | Performed by: INTERNAL MEDICINE

## 2023-12-01 PROCEDURE — 3008F PR BODY MASS INDEX (BMI) DOCUMENTED: ICD-10-PCS | Mod: CPTII,S$GLB,, | Performed by: INTERNAL MEDICINE

## 2023-12-01 PROCEDURE — 76830 US PELVIS COMP WITH TRANSVAG NON-OB (XPD): ICD-10-PCS | Mod: 26,,, | Performed by: INTERNAL MEDICINE

## 2023-12-01 PROCEDURE — 3078F PR MOST RECENT DIASTOLIC BLOOD PRESSURE < 80 MM HG: ICD-10-PCS | Mod: CPTII,S$GLB,, | Performed by: INTERNAL MEDICINE

## 2023-12-01 PROCEDURE — 3074F PR MOST RECENT SYSTOLIC BLOOD PRESSURE < 130 MM HG: ICD-10-PCS | Mod: CPTII,S$GLB,, | Performed by: INTERNAL MEDICINE

## 2023-12-01 PROCEDURE — 76830 TRANSVAGINAL US NON-OB: CPT | Mod: TC

## 2023-12-01 PROCEDURE — 99214 OFFICE O/P EST MOD 30 MIN: CPT | Mod: S$GLB,,, | Performed by: INTERNAL MEDICINE

## 2023-12-01 RX ORDER — EPINEPHRINE 0.3 MG/.3ML
0.3 INJECTION SUBCUTANEOUS ONCE AS NEEDED
Status: CANCELLED | OUTPATIENT
Start: 2023-12-06

## 2023-12-01 RX ORDER — SODIUM CHLORIDE 0.9 % (FLUSH) 0.9 %
10 SYRINGE (ML) INJECTION
Status: CANCELLED | OUTPATIENT
Start: 2024-01-20

## 2023-12-01 RX ORDER — HEPARIN 100 UNIT/ML
500 SYRINGE INTRAVENOUS
Status: CANCELLED | OUTPATIENT
Start: 2024-01-20

## 2023-12-01 RX ORDER — EPINEPHRINE 0.3 MG/.3ML
0.3 INJECTION SUBCUTANEOUS ONCE AS NEEDED
Status: CANCELLED | OUTPATIENT
Start: 2024-01-20

## 2023-12-01 RX ORDER — DIPHENHYDRAMINE HYDROCHLORIDE 50 MG/ML
50 INJECTION INTRAMUSCULAR; INTRAVENOUS ONCE AS NEEDED
Status: CANCELLED | OUTPATIENT
Start: 2024-01-20

## 2023-12-01 RX ORDER — SODIUM CHLORIDE 0.9 % (FLUSH) 0.9 %
10 SYRINGE (ML) INJECTION
Status: CANCELLED | OUTPATIENT
Start: 2023-12-06

## 2023-12-01 RX ORDER — HEPARIN 100 UNIT/ML
500 SYRINGE INTRAVENOUS
Status: CANCELLED | OUTPATIENT
Start: 2023-12-06

## 2023-12-01 RX ORDER — DIPHENHYDRAMINE HYDROCHLORIDE 50 MG/ML
50 INJECTION INTRAMUSCULAR; INTRAVENOUS ONCE AS NEEDED
Status: CANCELLED | OUTPATIENT
Start: 2023-12-06

## 2023-12-04 ENCOUNTER — PATIENT MESSAGE (OUTPATIENT)
Dept: OBSTETRICS AND GYNECOLOGY | Facility: CLINIC | Age: 28
End: 2023-12-04
Payer: COMMERCIAL

## 2023-12-13 ENCOUNTER — TELEPHONE (OUTPATIENT)
Dept: INFUSION THERAPY | Facility: HOSPITAL | Age: 28
End: 2023-12-13
Payer: COMMERCIAL

## 2023-12-13 NOTE — TELEPHONE ENCOUNTER
Confirmed upcoming infusion appointments with the patient. Reviewed pre infusion instructions with the patient.   1/19 at 3p  1/26 at 3p

## 2023-12-13 NOTE — TELEPHONE ENCOUNTER
Pt called the infusion center back. Requested to change 1/19 to earlier time. Confirmed rescheduled time of 1230p

## 2024-01-17 ENCOUNTER — OFFICE VISIT (OUTPATIENT)
Dept: OBSTETRICS AND GYNECOLOGY | Facility: CLINIC | Age: 29
End: 2024-01-17
Payer: COMMERCIAL

## 2024-01-17 VITALS — SYSTOLIC BLOOD PRESSURE: 125 MMHG | DIASTOLIC BLOOD PRESSURE: 87 MMHG | BODY MASS INDEX: 57.49 KG/M2 | WEIGHT: 293 LBS

## 2024-01-17 DIAGNOSIS — Z72.89 OTHER PROBLEMS RELATED TO LIFESTYLE: ICD-10-CM

## 2024-01-17 DIAGNOSIS — D25.9 UTERINE LEIOMYOMA, UNSPECIFIED LOCATION: ICD-10-CM

## 2024-01-17 DIAGNOSIS — Z12.4 PAP SMEAR FOR CERVICAL CANCER SCREENING: ICD-10-CM

## 2024-01-17 DIAGNOSIS — N85.8 OTHER SPECIFIED NONINFLAMMATORY DISORDERS OF UTERUS: ICD-10-CM

## 2024-01-17 DIAGNOSIS — E66.01 CLASS 3 SEVERE OBESITY WITH BODY MASS INDEX (BMI) OF 50.0 TO 59.9 IN ADULT, UNSPECIFIED OBESITY TYPE, UNSPECIFIED WHETHER SERIOUS COMORBIDITY PRESENT: ICD-10-CM

## 2024-01-17 DIAGNOSIS — Z01.419 ENCOUNTER FOR ANNUAL ROUTINE GYNECOLOGICAL EXAMINATION: Primary | ICD-10-CM

## 2024-01-17 DIAGNOSIS — E28.2 PCOS (POLYCYSTIC OVARIAN SYNDROME): ICD-10-CM

## 2024-01-17 DIAGNOSIS — Z11.3 SCREENING EXAMINATION FOR STD (SEXUALLY TRANSMITTED DISEASE): ICD-10-CM

## 2024-01-17 PROCEDURE — 88175 CYTOPATH C/V AUTO FLUID REDO: CPT | Performed by: OBSTETRICS & GYNECOLOGY

## 2024-01-17 PROCEDURE — 3074F SYST BP LT 130 MM HG: CPT | Mod: CPTII,S$GLB,, | Performed by: OBSTETRICS & GYNECOLOGY

## 2024-01-17 PROCEDURE — 3008F BODY MASS INDEX DOCD: CPT | Mod: CPTII,S$GLB,, | Performed by: OBSTETRICS & GYNECOLOGY

## 2024-01-17 PROCEDURE — 87491 CHLMYD TRACH DNA AMP PROBE: CPT | Performed by: OBSTETRICS & GYNECOLOGY

## 2024-01-17 PROCEDURE — 99395 PREV VISIT EST AGE 18-39: CPT | Mod: S$GLB,,, | Performed by: OBSTETRICS & GYNECOLOGY

## 2024-01-17 PROCEDURE — 1159F MED LIST DOCD IN RCRD: CPT | Mod: CPTII,S$GLB,, | Performed by: OBSTETRICS & GYNECOLOGY

## 2024-01-17 PROCEDURE — 87625 HPV TYPES 16 & 18 ONLY: CPT | Performed by: OBSTETRICS & GYNECOLOGY

## 2024-01-17 PROCEDURE — 99999 PR PBB SHADOW E&M-EST. PATIENT-LVL III: CPT | Mod: PBBFAC,,, | Performed by: OBSTETRICS & GYNECOLOGY

## 2024-01-17 PROCEDURE — 3079F DIAST BP 80-89 MM HG: CPT | Mod: CPTII,S$GLB,, | Performed by: OBSTETRICS & GYNECOLOGY

## 2024-01-17 PROCEDURE — 87624 HPV HI-RISK TYP POOLED RSLT: CPT | Performed by: OBSTETRICS & GYNECOLOGY

## 2024-01-18 ENCOUNTER — TELEPHONE (OUTPATIENT)
Dept: BARIATRICS | Facility: CLINIC | Age: 29
End: 2024-01-18
Payer: COMMERCIAL

## 2024-01-18 ENCOUNTER — TELEPHONE (OUTPATIENT)
Dept: OBSTETRICS AND GYNECOLOGY | Facility: CLINIC | Age: 29
End: 2024-01-18
Payer: COMMERCIAL

## 2024-01-18 NOTE — TELEPHONE ENCOUNTER
Spoke with pt. Pt was on her way to school so she did not have time to schedule her consult. Will send message to pt in portal with the earliest available date. Asked pt to call us back when she can

## 2024-01-18 NOTE — TELEPHONE ENCOUNTER
----- Message from Maki Gomes MD sent at 1/17/2024 12:32 PM CST -----  Hey,     This patient would like to see Dr. Toribio for a fibroid consult/possible myomectomy. I ordered her MRI today.     Thanks     Maki

## 2024-01-19 ENCOUNTER — LAB VISIT (OUTPATIENT)
Dept: LAB | Facility: HOSPITAL | Age: 29
End: 2024-01-19
Attending: FAMILY MEDICINE
Payer: COMMERCIAL

## 2024-01-19 ENCOUNTER — INFUSION (OUTPATIENT)
Dept: INFUSION THERAPY | Facility: HOSPITAL | Age: 29
End: 2024-01-19
Attending: INTERNAL MEDICINE
Payer: COMMERCIAL

## 2024-01-19 VITALS
OXYGEN SATURATION: 97 % | DIASTOLIC BLOOD PRESSURE: 87 MMHG | RESPIRATION RATE: 17 BRPM | HEART RATE: 102 BPM | TEMPERATURE: 99 F | SYSTOLIC BLOOD PRESSURE: 142 MMHG

## 2024-01-19 DIAGNOSIS — E28.2 PCOS (POLYCYSTIC OVARIAN SYNDROME): ICD-10-CM

## 2024-01-19 DIAGNOSIS — D50.0 IRON DEFICIENCY ANEMIA SECONDARY TO BLOOD LOSS (CHRONIC): ICD-10-CM

## 2024-01-19 DIAGNOSIS — D50.0 IRON DEFICIENCY ANEMIA SECONDARY TO BLOOD LOSS (CHRONIC): Primary | ICD-10-CM

## 2024-01-19 LAB
ANION GAP SERPL CALC-SCNC: 10 MMOL/L (ref 8–16)
BASOPHILS # BLD AUTO: 0.05 K/UL (ref 0–0.2)
BASOPHILS NFR BLD: 1 % (ref 0–1.9)
BUN SERPL-MCNC: 11 MG/DL (ref 6–20)
C TRACH DNA SPEC QL NAA+PROBE: NOT DETECTED
CALCIUM SERPL-MCNC: 8.9 MG/DL (ref 8.7–10.5)
CHLORIDE SERPL-SCNC: 104 MMOL/L (ref 95–110)
CO2 SERPL-SCNC: 23 MMOL/L (ref 23–29)
CREAT SERPL-MCNC: 0.7 MG/DL (ref 0.5–1.4)
DIFFERENTIAL METHOD BLD: ABNORMAL
EOSINOPHIL # BLD AUTO: 0.1 K/UL (ref 0–0.5)
EOSINOPHIL NFR BLD: 2.4 % (ref 0–8)
ERYTHROCYTE [DISTWIDTH] IN BLOOD BY AUTOMATED COUNT: 18.3 % (ref 11.5–14.5)
EST. GFR  (NO RACE VARIABLE): >60 ML/MIN/1.73 M^2
FERRITIN SERPL-MCNC: 8 NG/ML (ref 20–300)
GLUCOSE SERPL-MCNC: 84 MG/DL (ref 70–110)
HCT VFR BLD AUTO: 30.1 % (ref 37–48.5)
HGB BLD-MCNC: 8.7 G/DL (ref 12–16)
IMM GRANULOCYTES # BLD AUTO: 0.02 K/UL (ref 0–0.04)
IMM GRANULOCYTES NFR BLD AUTO: 0.4 % (ref 0–0.5)
INSULIN COLLECTION INTERVAL: 1
INSULIN SERPL-ACNC: 23.7 UU/ML
IRON SERPL-MCNC: 23 UG/DL (ref 30–160)
LYMPHOCYTES # BLD AUTO: 1.9 K/UL (ref 1–4.8)
LYMPHOCYTES NFR BLD: 37.5 % (ref 18–48)
MCH RBC QN AUTO: 19.4 PG (ref 27–31)
MCHC RBC AUTO-ENTMCNC: 28.9 G/DL (ref 32–36)
MCV RBC AUTO: 67 FL (ref 82–98)
MONOCYTES # BLD AUTO: 0.3 K/UL (ref 0.3–1)
MONOCYTES NFR BLD: 6.5 % (ref 4–15)
N GONORRHOEA DNA SPEC QL NAA+PROBE: NOT DETECTED
NEUTROPHILS # BLD AUTO: 2.7 K/UL (ref 1.8–7.7)
NEUTROPHILS NFR BLD: 52.2 % (ref 38–73)
NRBC BLD-RTO: 0 /100 WBC
PLATELET # BLD AUTO: 468 K/UL (ref 150–450)
PMV BLD AUTO: 8.5 FL (ref 9.2–12.9)
POTASSIUM SERPL-SCNC: 3.8 MMOL/L (ref 3.5–5.1)
RBC # BLD AUTO: 4.48 M/UL (ref 4–5.4)
SATURATED IRON: 5 % (ref 20–50)
SODIUM SERPL-SCNC: 137 MMOL/L (ref 136–145)
TOTAL IRON BINDING CAPACITY: 497 UG/DL (ref 250–450)
TRANSFERRIN SERPL-MCNC: 336 MG/DL (ref 200–375)
WBC # BLD AUTO: 5.09 K/UL (ref 3.9–12.7)

## 2024-01-19 PROCEDURE — 25000003 PHARM REV CODE 250: Performed by: INTERNAL MEDICINE

## 2024-01-19 PROCEDURE — 82728 ASSAY OF FERRITIN: CPT | Performed by: INTERNAL MEDICINE

## 2024-01-19 PROCEDURE — 83525 ASSAY OF INSULIN: CPT | Performed by: OBSTETRICS & GYNECOLOGY

## 2024-01-19 PROCEDURE — A4216 STERILE WATER/SALINE, 10 ML: HCPCS | Performed by: INTERNAL MEDICINE

## 2024-01-19 PROCEDURE — 83540 ASSAY OF IRON: CPT | Performed by: INTERNAL MEDICINE

## 2024-01-19 PROCEDURE — 96365 THER/PROPH/DIAG IV INF INIT: CPT

## 2024-01-19 PROCEDURE — 36415 COLL VENOUS BLD VENIPUNCTURE: CPT | Performed by: INTERNAL MEDICINE

## 2024-01-19 PROCEDURE — 63600175 PHARM REV CODE 636 W HCPCS: Mod: JZ,JG | Performed by: INTERNAL MEDICINE

## 2024-01-19 PROCEDURE — 85025 COMPLETE CBC W/AUTO DIFF WBC: CPT | Performed by: INTERNAL MEDICINE

## 2024-01-19 PROCEDURE — 80048 BASIC METABOLIC PNL TOTAL CA: CPT | Performed by: OBSTETRICS & GYNECOLOGY

## 2024-01-19 RX ORDER — DIPHENHYDRAMINE HYDROCHLORIDE 50 MG/ML
50 INJECTION INTRAMUSCULAR; INTRAVENOUS ONCE AS NEEDED
Status: DISCONTINUED | OUTPATIENT
Start: 2024-01-19 | End: 2024-01-19 | Stop reason: HOSPADM

## 2024-01-19 RX ORDER — EPINEPHRINE 0.3 MG/.3ML
0.3 INJECTION SUBCUTANEOUS ONCE AS NEEDED
Status: DISCONTINUED | OUTPATIENT
Start: 2024-01-19 | End: 2024-01-19 | Stop reason: HOSPADM

## 2024-01-19 RX ORDER — SODIUM CHLORIDE 0.9 % (FLUSH) 0.9 %
10 SYRINGE (ML) INJECTION
Status: DISCONTINUED | OUTPATIENT
Start: 2024-01-19 | End: 2024-01-19 | Stop reason: HOSPADM

## 2024-01-19 RX ADMIN — SODIUM CHLORIDE: 9 INJECTION, SOLUTION INTRAVENOUS at 12:01

## 2024-01-19 RX ADMIN — Medication 10 ML: at 01:01

## 2024-01-19 RX ADMIN — FERRIC CARBOXYMALTOSE INJECTION 750 MG: 50 INJECTION, SOLUTION INTRAVENOUS at 12:01

## 2024-01-19 NOTE — PLAN OF CARE
Pt tolerated injectafer infusion 1 of 2 well.  No adverse reaction noted during medication administration and 30 min post. IV flushed with NS and D/C per protocol.  Patient left clinic in no acute distress.

## 2024-01-20 ENCOUNTER — PATIENT MESSAGE (OUTPATIENT)
Dept: OBSTETRICS AND GYNECOLOGY | Facility: CLINIC | Age: 29
End: 2024-01-20
Payer: COMMERCIAL

## 2024-01-20 DIAGNOSIS — E88.819 INSULIN RESISTANCE: Primary | ICD-10-CM

## 2024-01-20 RX ORDER — METFORMIN HYDROCHLORIDE 1000 MG/1
1000 TABLET, FILM COATED, EXTENDED RELEASE ORAL 2 TIMES DAILY WITH MEALS
Qty: 180 TABLET | Refills: 3 | Status: SHIPPED | OUTPATIENT
Start: 2024-01-20 | End: 2025-01-19

## 2024-01-25 ENCOUNTER — TELEPHONE (OUTPATIENT)
Dept: BARIATRICS | Facility: CLINIC | Age: 29
End: 2024-01-25
Payer: COMMERCIAL

## 2024-01-27 ENCOUNTER — HOSPITAL ENCOUNTER (OUTPATIENT)
Dept: RADIOLOGY | Facility: HOSPITAL | Age: 29
Discharge: HOME | End: 2024-01-27
Attending: OBSTETRICS & GYNECOLOGY
Payer: COMMERCIAL

## 2024-01-27 DIAGNOSIS — D25.9 UTERINE LEIOMYOMA, UNSPECIFIED LOCATION: ICD-10-CM

## 2024-01-27 DIAGNOSIS — N85.8 OTHER SPECIFIED NONINFLAMMATORY DISORDERS OF UTERUS: ICD-10-CM

## 2024-01-27 PROCEDURE — 72197 MRI PELVIS W/O & W/DYE: CPT | Mod: 26,,, | Performed by: RADIOLOGY

## 2024-01-27 PROCEDURE — A9585 GADOBUTROL INJECTION: HCPCS | Performed by: OBSTETRICS & GYNECOLOGY

## 2024-01-27 PROCEDURE — 72197 MRI PELVIS W/O & W/DYE: CPT | Mod: TC

## 2024-01-27 PROCEDURE — 25500020 PHARM REV CODE 255: Performed by: OBSTETRICS & GYNECOLOGY

## 2024-01-27 RX ORDER — GADOBUTROL 604.72 MG/ML
10 INJECTION INTRAVENOUS
Status: COMPLETED | OUTPATIENT
Start: 2024-01-27 | End: 2024-01-27

## 2024-01-27 RX ADMIN — GADOBUTROL 10 ML: 604.72 INJECTION INTRAVENOUS at 08:01

## 2024-01-29 ENCOUNTER — TELEPHONE (OUTPATIENT)
Dept: INFUSION THERAPY | Facility: HOSPITAL | Age: 29
End: 2024-01-29
Payer: COMMERCIAL

## 2024-01-29 ENCOUNTER — TELEPHONE (OUTPATIENT)
Dept: BARIATRICS | Facility: CLINIC | Age: 29
End: 2024-01-29
Payer: COMMERCIAL

## 2024-01-29 NOTE — TELEPHONE ENCOUNTER
Called the patient to rescheduled missed iron infusion. No answer. Left message on voicemail to call infusion center back to reschedule

## 2024-01-30 ENCOUNTER — PATIENT MESSAGE (OUTPATIENT)
Dept: OBSTETRICS AND GYNECOLOGY | Facility: CLINIC | Age: 29
End: 2024-01-30
Payer: COMMERCIAL

## 2024-01-31 ENCOUNTER — TELEPHONE (OUTPATIENT)
Dept: OBSTETRICS AND GYNECOLOGY | Facility: CLINIC | Age: 29
End: 2024-01-31
Payer: COMMERCIAL

## 2024-01-31 LAB
CLINICAL INFO: ABNORMAL
CYTO CVX: ABNORMAL
CYTOLOGIST CVX/VAG CYTO: ABNORMAL
CYTOLOGIST CVX/VAG CYTO: ABNORMAL
CYTOLOGY CMNT CVX/VAG CYTO-IMP: ABNORMAL
CYTOLOGY PAP THIN PREP EXPLANATION: ABNORMAL
DATE OF PREVIOUS PAP: NO
DATE PREVIOUS BX: NO
GEN CATEG CVX/VAG CYTO-IMP: ABNORMAL
HPV I/H RISK 4 DNA CVX QL NAA+PROBE: DETECTED
HPV16 DNA CVX QL PROBE+SIG AMP: NOT DETECTED
HPV18 DNA CVX QL PROBE+SIG AMP: NOT DETECTED
LMP START DATE: ABNORMAL
MICROORGANISM CVX/VAG CYTO: ABNORMAL
PATHOLOGIST CVX/VAG CYTO: ABNORMAL
SERVICE CMNT-IMP: ABNORMAL
SPECIMEN SOURCE CVX/VAG CYTO: ABNORMAL
STAT OF ADQ CVX/VAG CYTO-IMP: ABNORMAL

## 2024-01-31 NOTE — TELEPHONE ENCOUNTER
----- Message from Maki Gomes MD sent at 1/31/2024  8:37 AM CST -----  Regarding: surgery  Patient would like to meet with Dr. Toribio to see about possibly having the fibroid removed. Could you please set her up for an appointment. Thanks.     Maki Gomes

## 2024-02-07 ENCOUNTER — INFUSION (OUTPATIENT)
Dept: INFUSION THERAPY | Facility: HOSPITAL | Age: 29
End: 2024-02-07
Attending: INTERNAL MEDICINE
Payer: COMMERCIAL

## 2024-02-07 VITALS
HEIGHT: 63 IN | HEART RATE: 102 BPM | SYSTOLIC BLOOD PRESSURE: 144 MMHG | TEMPERATURE: 98 F | WEIGHT: 293 LBS | OXYGEN SATURATION: 98 % | DIASTOLIC BLOOD PRESSURE: 72 MMHG | BODY MASS INDEX: 51.91 KG/M2 | RESPIRATION RATE: 16 BRPM

## 2024-02-07 DIAGNOSIS — D50.0 IRON DEFICIENCY ANEMIA SECONDARY TO BLOOD LOSS (CHRONIC): Primary | ICD-10-CM

## 2024-02-07 PROCEDURE — 96365 THER/PROPH/DIAG IV INF INIT: CPT

## 2024-02-07 PROCEDURE — 63600175 PHARM REV CODE 636 W HCPCS: Mod: JZ,JG | Performed by: INTERNAL MEDICINE

## 2024-02-07 PROCEDURE — A4216 STERILE WATER/SALINE, 10 ML: HCPCS | Performed by: INTERNAL MEDICINE

## 2024-02-07 PROCEDURE — 25000003 PHARM REV CODE 250: Performed by: INTERNAL MEDICINE

## 2024-02-07 RX ORDER — EPINEPHRINE 0.3 MG/.3ML
0.3 INJECTION SUBCUTANEOUS ONCE AS NEEDED
Status: DISCONTINUED | OUTPATIENT
Start: 2024-02-07 | End: 2024-02-07 | Stop reason: HOSPADM

## 2024-02-07 RX ORDER — SODIUM CHLORIDE 0.9 % (FLUSH) 0.9 %
10 SYRINGE (ML) INJECTION
Status: DISCONTINUED | OUTPATIENT
Start: 2024-02-07 | End: 2024-02-07 | Stop reason: HOSPADM

## 2024-02-07 RX ORDER — HEPARIN 100 UNIT/ML
500 SYRINGE INTRAVENOUS
Status: DISCONTINUED | OUTPATIENT
Start: 2024-02-07 | End: 2024-02-07 | Stop reason: HOSPADM

## 2024-02-07 RX ORDER — DIPHENHYDRAMINE HYDROCHLORIDE 50 MG/ML
50 INJECTION INTRAMUSCULAR; INTRAVENOUS ONCE AS NEEDED
Status: DISCONTINUED | OUTPATIENT
Start: 2024-02-07 | End: 2024-02-07 | Stop reason: HOSPADM

## 2024-02-07 RX ADMIN — SODIUM CHLORIDE: 9 INJECTION, SOLUTION INTRAVENOUS at 03:02

## 2024-02-07 RX ADMIN — FERRIC CARBOXYMALTOSE INJECTION 750 MG: 50 INJECTION, SOLUTION INTRAVENOUS at 03:02

## 2024-02-07 RX ADMIN — Medication 10 ML: at 03:02

## 2024-02-08 ENCOUNTER — TELEPHONE (OUTPATIENT)
Dept: OBSTETRICS AND GYNECOLOGY | Facility: HOSPITAL | Age: 29
End: 2024-02-08
Payer: COMMERCIAL

## 2024-02-08 ENCOUNTER — PATIENT MESSAGE (OUTPATIENT)
Dept: OBSTETRICS AND GYNECOLOGY | Facility: CLINIC | Age: 29
End: 2024-02-08
Payer: COMMERCIAL

## 2024-02-08 NOTE — TELEPHONE ENCOUNTER
Pls call to set up colpo, I called her a few times but it says her phone isn't accepting calls so I sent a my chart message today

## 2024-02-09 ENCOUNTER — OFFICE VISIT (OUTPATIENT)
Dept: SURGERY | Facility: CLINIC | Age: 29
End: 2024-02-09
Payer: COMMERCIAL

## 2024-02-09 VITALS
HEART RATE: 93 BPM | HEIGHT: 63 IN | SYSTOLIC BLOOD PRESSURE: 150 MMHG | BODY MASS INDEX: 51.91 KG/M2 | DIASTOLIC BLOOD PRESSURE: 88 MMHG | WEIGHT: 293 LBS

## 2024-02-09 DIAGNOSIS — K43.9 VENTRAL HERNIA WITHOUT OBSTRUCTION OR GANGRENE: Primary | ICD-10-CM

## 2024-02-09 PROCEDURE — 99204 OFFICE O/P NEW MOD 45 MIN: CPT | Mod: S$GLB,,, | Performed by: STUDENT IN AN ORGANIZED HEALTH CARE EDUCATION/TRAINING PROGRAM

## 2024-02-09 PROCEDURE — 3077F SYST BP >= 140 MM HG: CPT | Mod: CPTII,S$GLB,, | Performed by: STUDENT IN AN ORGANIZED HEALTH CARE EDUCATION/TRAINING PROGRAM

## 2024-02-09 PROCEDURE — 3079F DIAST BP 80-89 MM HG: CPT | Mod: CPTII,S$GLB,, | Performed by: STUDENT IN AN ORGANIZED HEALTH CARE EDUCATION/TRAINING PROGRAM

## 2024-02-09 PROCEDURE — 3008F BODY MASS INDEX DOCD: CPT | Mod: CPTII,S$GLB,, | Performed by: STUDENT IN AN ORGANIZED HEALTH CARE EDUCATION/TRAINING PROGRAM

## 2024-02-09 PROCEDURE — 99999 PR PBB SHADOW E&M-EST. PATIENT-LVL III: CPT | Mod: PBBFAC,,, | Performed by: STUDENT IN AN ORGANIZED HEALTH CARE EDUCATION/TRAINING PROGRAM

## 2024-02-09 PROCEDURE — 1159F MED LIST DOCD IN RCRD: CPT | Mod: CPTII,S$GLB,, | Performed by: STUDENT IN AN ORGANIZED HEALTH CARE EDUCATION/TRAINING PROGRAM

## 2024-02-09 NOTE — PROGRESS NOTES
Patient ID: Peyton Qiu is a 28 y.o. female.    Chief Complaint: No chief complaint on file.      HPI:  HPI  28F with supraumbilical bulge and pain noted a few weeks ago with exercise. Never had this before.   No hx of ab surgery  Has known uterine fibroids, scheduled to meet with dr isaura monroe.     Review of Systems   Constitutional:  Negative for fever.   HENT:  Negative for trouble swallowing.    Respiratory:  Negative for shortness of breath.    Cardiovascular:  Negative for chest pain.   Gastrointestinal:  Positive for abdominal pain. Negative for blood in stool, nausea and vomiting.   Genitourinary:  Negative for dysuria.   Musculoskeletal:  Negative for gait problem.   Skin:  Negative for rash and wound.   Allergic/Immunologic: Negative for immunocompromised state.   Neurological:  Negative for weakness.   Hematological:  Does not bruise/bleed easily.   Psychiatric/Behavioral:  Negative for agitation.        Current Outpatient Medications   Medication Sig Dispense Refill    ferrous sulfate 325 (65 FE) MG EC tablet Take 1 tablet (325 mg total) by mouth every other day. 60 tablet 3    ibuprofen (ADVIL,MOTRIN) 600 MG tablet Take 1 tablet (600 mg total) by mouth every 6 (six) hours as needed (cramping). 30 tablet 0    ketoconazole (NIZORAL) 2 % cream Apply topically 2 (two) times daily. 30 g 0    meloxicam (MOBIC) 15 MG tablet Take 1 tablet (15 mg total) by mouth once daily. 60 tablet 1    metFORMIN (GLUMETZA) 1000 MG (MOD) 24hr tablet Take 1 tablet (1,000 mg total) by mouth 2 (two) times daily with meals. 180 tablet 3    norethindrone-ethinyl estradiol (MICROGESTIN 1/20) 1-20 mg-mcg per tablet Pill cascade: Take one pill three times a day for two days then one pill twice a daily for 4 days then daily 28 tablet 3    ondansetron (ZOFRAN-ODT) 4 MG TbDL Take 1 tablet (4 mg total) by mouth every 6 (six) hours as needed. 15 tablet 0    OZEMPIC 0.25 mg or 0.5 mg (2 mg/3 mL) pen injector Inject  into the skin.      prenatal vit no.124/iron/folic (PRENATAL VITAMIN ORAL) Take by mouth.      triamcinolone acetonide 0.1% (KENALOG) 0.1 % ointment Apply topically 2 (two) times daily. 30 g 0    norethindrone (ORTHO MICRONOR) 0.35 mg tablet Take 1 tablet (0.35 mg total) by mouth once daily. (Patient not taking: Reported on 6/11/2023) 84 tablet 3    norgestimate-ethinyl estradioL (ORTHO-CYCLEN) 0.25-35 mg-mcg per tablet Take 1 tablet by mouth once daily. (Patient not taking: Reported on 1/17/2024) 84 tablet 3     No current facility-administered medications for this visit.       Review of patient's allergies indicates:   Allergen Reactions    Augmentin [amoxicillin-pot clavulanate] Rash    Betamethasone, augmented Diarrhea, Hives, Nausea And Vomiting and Rash       Past Medical History:   Diagnosis Date    Eczema     Seasonal allergic rhinitis     Vertigo        Past Surgical History:   Procedure Laterality Date    removal of cyst in head  12/20/2023    WISDOM TOOTH EXTRACTION         Social History     Socioeconomic History    Marital status: Single   Tobacco Use    Smoking status: Never    Smokeless tobacco: Never   Substance and Sexual Activity    Alcohol use: No    Drug use: Never    Sexual activity: Yes     Partners: Male     Social Determinants of Health     Financial Resource Strain: Low Risk  (12/1/2023)    Overall Financial Resource Strain (CARDIA)     Difficulty of Paying Living Expenses: Not very hard   Food Insecurity: No Food Insecurity (12/1/2023)    Hunger Vital Sign     Worried About Running Out of Food in the Last Year: Never true     Ran Out of Food in the Last Year: Never true   Transportation Needs: No Transportation Needs (12/1/2023)    PRAPARE - Transportation     Lack of Transportation (Medical): No     Lack of Transportation (Non-Medical): No   Physical Activity: Insufficiently Active (12/1/2023)    Exercise Vital Sign     Days of Exercise per Week: 2 days     Minutes of Exercise per Session:  20 min   Stress: No Stress Concern Present (12/1/2023)    [employer] - Occupational Stress Questionnaire     Feeling of Stress : Only a little   Social Connections: Unknown (12/1/2023)    Social Connection and Isolation Panel [NHANES]     Frequency of Communication with Friends and Family: More than three times a week     Frequency of Social Gatherings with Friends and Family: Once a week     Active Member of Clubs or Organizations: Yes     Attends Club or Organization Meetings: More than 4 times per year     Marital Status: Never    Housing Stability: High Risk (12/1/2023)    Housing Stability Vital Sign     Unable to Pay for Housing in the Last Year: Yes     Unstable Housing in the Last Year: No       Vitals:    02/09/24 1311   BP: (!) 150/88   Pulse: 93       Physical Exam  Constitutional:       General: She is not in acute distress.     Appearance: She is well-developed.   HENT:      Head: Normocephalic and atraumatic.   Eyes:      General: No scleral icterus.  Cardiovascular:      Rate and Rhythm: Normal rate.   Pulmonary:      Effort: Pulmonary effort is normal.      Breath sounds: No stridor.   Abdominal:      General: There is no distension.      Palpations: Abdomen is soft.      Tenderness: There is no abdominal tenderness.       Lymphadenopathy:      Cervical: No cervical adenopathy.   Skin:     General: Skin is warm.      Findings: No erythema.   Neurological:      Mental Status: She is alert and oriented to person, place, and time.   Psychiatric:         Behavior: Behavior normal.     Body mass index is 57.39 kg/m².  MRI of pelvis shows at least 2cm defect just above umbilicus   Plus 5cm fibroid    Assessment & Plan:  28F with ventral hernia and uterine fibroid  Recommended weight loss, scheduled to see bariatrics  Ideally could get down to around 200# but will take a lot of time  Also would prefer to repair any hernia after uterine fibroid

## 2024-03-11 ENCOUNTER — PATIENT MESSAGE (OUTPATIENT)
Dept: OBSTETRICS AND GYNECOLOGY | Facility: CLINIC | Age: 29
End: 2024-03-11
Payer: COMMERCIAL

## 2024-03-20 RX ORDER — NORETHINDRONE ACETATE AND ETHINYL ESTRADIOL .02; 1 MG/1; MG/1
1 TABLET ORAL DAILY
Qty: 84 TABLET | Refills: 3 | Status: SHIPPED | OUTPATIENT
Start: 2024-03-20

## 2024-03-20 NOTE — TELEPHONE ENCOUNTER
"Refill Routing Note   Medication(s) are not appropriate for processing by [facility] for the following reason(s):        Clarification of medication (Rx) details( SIG updated to reflect current daily use)  Also, 21-day or 28-day " Fe" pack( anemia notes found)    [facility] action(s):  Defer        Medication Therapy Plan: Updated SIG to " Take 1 tablet daily". Notes from 10/13/23 indicated to do the cascade for not more than 1 pack. Update rx deferred for review and to ensure the 21-day pack was intended or should it be updated to the 28-day " FE" version?      Appointments  past 12m or future 3m with PCP    Date Provider   Last Visit   1/17/2024 Maki Gomes MD   Next Visit   Visit date not found Maki Gomes MD   ED visits in past 90 days: 0        Note composed:6:37 AM 03/20/2024          "

## 2024-03-30 ENCOUNTER — HOSPITAL ENCOUNTER (EMERGENCY)
Facility: HOSPITAL | Age: 29
Discharge: HOME | End: 2024-03-30
Attending: STUDENT IN AN ORGANIZED HEALTH CARE EDUCATION/TRAINING PROGRAM
Payer: COMMERCIAL

## 2024-03-30 VITALS
OXYGEN SATURATION: 100 % | RESPIRATION RATE: 18 BRPM | DIASTOLIC BLOOD PRESSURE: 93 MMHG | BODY MASS INDEX: 50.02 KG/M2 | WEIGHT: 293 LBS | HEART RATE: 81 BPM | TEMPERATURE: 97 F | HEIGHT: 64 IN | SYSTOLIC BLOOD PRESSURE: 149 MMHG

## 2024-03-30 DIAGNOSIS — D25.9 UTERINE LEIOMYOMA, UNSPECIFIED LOCATION: ICD-10-CM

## 2024-03-30 DIAGNOSIS — K42.9 UMBILICAL HERNIA WITHOUT OBSTRUCTION AND WITHOUT GANGRENE: ICD-10-CM

## 2024-03-30 DIAGNOSIS — K29.70 GASTRITIS, PRESENCE OF BLEEDING UNSPECIFIED, UNSPECIFIED CHRONICITY, UNSPECIFIED GASTRITIS TYPE: Primary | ICD-10-CM

## 2024-03-30 DIAGNOSIS — R10.9 ABDOMINAL PAIN, UNSPECIFIED ABDOMINAL LOCATION: ICD-10-CM

## 2024-03-30 LAB
ALBUMIN SERPL BCP-MCNC: 4 G/DL (ref 3.5–5.2)
ALP SERPL-CCNC: 80 U/L (ref 55–135)
ALT SERPL W/O P-5'-P-CCNC: 31 U/L (ref 10–44)
ANION GAP SERPL CALC-SCNC: 11 MMOL/L (ref 8–16)
AST SERPL-CCNC: 26 U/L (ref 10–40)
B-HCG UR QL: NEGATIVE
BACTERIA #/AREA URNS HPF: ABNORMAL /HPF
BASOPHILS # BLD AUTO: 0.03 K/UL (ref 0–0.2)
BASOPHILS NFR BLD: 0.5 % (ref 0–1.9)
BILIRUB SERPL-MCNC: 0.2 MG/DL (ref 0.1–1)
BILIRUB UR QL STRIP: NEGATIVE
BUN SERPL-MCNC: 7 MG/DL (ref 6–20)
CALCIUM SERPL-MCNC: 9.5 MG/DL (ref 8.7–10.5)
CHLORIDE SERPL-SCNC: 103 MMOL/L (ref 95–110)
CLARITY UR: CLEAR
CO2 SERPL-SCNC: 25 MMOL/L (ref 23–29)
COLOR UR: YELLOW
CREAT SERPL-MCNC: 0.8 MG/DL (ref 0.5–1.4)
CTP QC/QA: YES
DIFFERENTIAL METHOD BLD: ABNORMAL
EOSINOPHIL # BLD AUTO: 0.1 K/UL (ref 0–0.5)
EOSINOPHIL NFR BLD: 1.8 % (ref 0–8)
ERYTHROCYTE [DISTWIDTH] IN BLOOD BY AUTOMATED COUNT: 22.5 % (ref 11.5–14.5)
EST. GFR  (NO RACE VARIABLE): >60 ML/MIN/1.73 M^2
GLUCOSE SERPL-MCNC: 74 MG/DL (ref 70–110)
GLUCOSE UR QL STRIP: NEGATIVE
HCT VFR BLD AUTO: 44.7 % (ref 37–48.5)
HGB BLD-MCNC: 14.3 G/DL (ref 12–16)
HGB UR QL STRIP: NEGATIVE
HYALINE CASTS #/AREA URNS LPF: 0 /LPF
IMM GRANULOCYTES # BLD AUTO: 0.01 K/UL (ref 0–0.04)
IMM GRANULOCYTES NFR BLD AUTO: 0.2 % (ref 0–0.5)
KETONES UR QL STRIP: NEGATIVE
LEUKOCYTE ESTERASE UR QL STRIP: ABNORMAL
LIPASE SERPL-CCNC: 39 U/L (ref 4–60)
LYMPHOCYTES # BLD AUTO: 2 K/UL (ref 1–4.8)
LYMPHOCYTES NFR BLD: 33.4 % (ref 18–48)
MCH RBC QN AUTO: 26 PG (ref 27–31)
MCHC RBC AUTO-ENTMCNC: 32 G/DL (ref 32–36)
MCV RBC AUTO: 81 FL (ref 82–98)
MICROSCOPIC COMMENT: ABNORMAL
MONOCYTES # BLD AUTO: 0.4 K/UL (ref 0.3–1)
MONOCYTES NFR BLD: 6.9 % (ref 4–15)
NEUTROPHILS # BLD AUTO: 3.4 K/UL (ref 1.8–7.7)
NEUTROPHILS NFR BLD: 57.2 % (ref 38–73)
NITRITE UR QL STRIP: NEGATIVE
NRBC BLD-RTO: 0 /100 WBC
PH UR STRIP: 7 [PH] (ref 5–8)
PLATELET # BLD AUTO: 360 K/UL (ref 150–450)
PMV BLD AUTO: 8.7 FL (ref 9.2–12.9)
POC MOLECULAR INFLUENZA A AGN: NEGATIVE
POC MOLECULAR INFLUENZA B AGN: NEGATIVE
POTASSIUM SERPL-SCNC: 4 MMOL/L (ref 3.5–5.1)
PROT SERPL-MCNC: 8.7 G/DL (ref 6–8.4)
PROT UR QL STRIP: ABNORMAL
RBC # BLD AUTO: 5.51 M/UL (ref 4–5.4)
RBC #/AREA URNS HPF: 0 /HPF (ref 0–4)
SARS-COV-2 RDRP RESP QL NAA+PROBE: NEGATIVE
SODIUM SERPL-SCNC: 139 MMOL/L (ref 136–145)
SP GR UR STRIP: 1.03 (ref 1–1.03)
URN SPEC COLLECT METH UR: ABNORMAL
UROBILINOGEN UR STRIP-ACNC: NEGATIVE EU/DL
WBC # BLD AUTO: 5.95 K/UL (ref 3.9–12.7)
WBC #/AREA URNS HPF: 3 /HPF (ref 0–5)

## 2024-03-30 PROCEDURE — 81025 URINE PREGNANCY TEST: CPT | Performed by: NURSE PRACTITIONER

## 2024-03-30 PROCEDURE — 25500020 PHARM REV CODE 255: Performed by: NURSE PRACTITIONER

## 2024-03-30 PROCEDURE — 80053 COMPREHEN METABOLIC PANEL: CPT | Performed by: NURSE PRACTITIONER

## 2024-03-30 PROCEDURE — 85025 COMPLETE CBC W/AUTO DIFF WBC: CPT | Performed by: NURSE PRACTITIONER

## 2024-03-30 PROCEDURE — 99285 EMERGENCY DEPT VISIT HI MDM: CPT | Mod: 25

## 2024-03-30 PROCEDURE — 25000003 PHARM REV CODE 250: Performed by: NURSE PRACTITIONER

## 2024-03-30 PROCEDURE — 87635 SARS-COV-2 COVID-19 AMP PRB: CPT | Performed by: NURSE PRACTITIONER

## 2024-03-30 PROCEDURE — 81000 URINALYSIS NONAUTO W/SCOPE: CPT | Performed by: NURSE PRACTITIONER

## 2024-03-30 PROCEDURE — 83690 ASSAY OF LIPASE: CPT | Performed by: NURSE PRACTITIONER

## 2024-03-30 PROCEDURE — 87502 INFLUENZA DNA AMP PROBE: CPT

## 2024-03-30 RX ORDER — ALUMINUM HYDROXIDE, MAGNESIUM HYDROXIDE, AND SIMETHICONE 1200; 120; 1200 MG/30ML; MG/30ML; MG/30ML
30 SUSPENSION ORAL ONCE
Status: COMPLETED | OUTPATIENT
Start: 2024-03-30 | End: 2024-03-30

## 2024-03-30 RX ORDER — ONDANSETRON 4 MG/1
4 TABLET, ORALLY DISINTEGRATING ORAL EVERY 8 HOURS PRN
Qty: 9 TABLET | Refills: 0 | Status: SHIPPED | OUTPATIENT
Start: 2024-03-30 | End: 2024-04-02

## 2024-03-30 RX ORDER — OMEPRAZOLE 20 MG/1
20 CAPSULE, DELAYED RELEASE ORAL DAILY
Qty: 30 CAPSULE | Refills: 0 | Status: SHIPPED | OUTPATIENT
Start: 2024-03-30 | End: 2024-04-29

## 2024-03-30 RX ORDER — DICYCLOMINE HYDROCHLORIDE 20 MG/1
20 TABLET ORAL 2 TIMES DAILY
Qty: 10 TABLET | Refills: 0 | Status: SHIPPED | OUTPATIENT
Start: 2024-03-30 | End: 2024-04-04

## 2024-03-30 RX ADMIN — ALUMINUM HYDROXIDE, MAGNESIUM HYDROXIDE, AND DIMETHICONE 30 ML: 200; 20; 200 SUSPENSION ORAL at 06:03

## 2024-03-30 RX ADMIN — IOHEXOL 100 ML: 350 INJECTION, SOLUTION INTRAVENOUS at 07:03

## 2024-03-30 NOTE — ED PROVIDER NOTES
Encounter Date: 3/30/2024       History     Chief Complaint   Patient presents with    Abdominal Pain     Abd cramping with fever, congestion, n/v/d since Monday; sharp pain to RUQ; hx of hernia     28 yr old female presents to the ER with reports of abd cramping, nausea, vomiting and diarrhea. Denies melena or hematochezia. Denies fever. No dysuria. Pt states she has been having generalized abd pain however also having increase in pain to the right upper, epigastric and periumbilical. Pt was recently diagnosed with hernia therefore states she is not sure if its the hernia that is causing her pain. Denies vaginal dc or bleeding. PMH of eczema and vertigo.  No melena, no hematochezia, no hematemesis.    The history is provided by the patient. No  was used.     Review of patient's allergies indicates:   Allergen Reactions    Augmentin [amoxicillin-pot clavulanate] Rash    Betamethasone, augmented Diarrhea, Hives, Nausea And Vomiting and Rash     Past Medical History:   Diagnosis Date    Eczema     Seasonal allergic rhinitis     Vertigo      Past Surgical History:   Procedure Laterality Date    removal of cyst in head  12/20/2023    WISDOM TOOTH EXTRACTION       Family History   Problem Relation Age of Onset    Coronary artery disease Father     Diabetes Maternal Grandmother     Heart disease Maternal Grandmother     No Known Problems Mother      Social History     Tobacco Use    Smoking status: Never    Smokeless tobacco: Never   Substance Use Topics    Alcohol use: No    Drug use: Never     Review of Systems   Gastrointestinal:  Positive for abdominal pain, diarrhea, nausea and vomiting.   All other systems reviewed and are negative.      Physical Exam     Initial Vitals [03/30/24 1603]   BP Pulse Resp Temp SpO2   (!) 172/94 78 17 98.4 °F (36.9 °C) 100 %      MAP       --         Physical Exam    Constitutional: She appears well-developed and well-nourished. She is Obese .   HENT:   Head:  Normocephalic.   Right Ear: Hearing and tympanic membrane normal.   Left Ear: Hearing and tympanic membrane normal.   Nose: Nose normal.   Mouth/Throat: Oropharynx is clear and moist.   Eyes: Lids are normal. Pupils are equal, round, and reactive to light.   Neck:   Normal range of motion.  Cardiovascular:  Normal rate.           Pulmonary/Chest: Breath sounds normal. No respiratory distress. She has no wheezes. She has no rhonchi.   Abdominal: Abdomen is soft. There is abdominal tenderness in the right upper quadrant, epigastric area and periumbilical area.   No palpable hernia noted.    Musculoskeletal:         General: Normal range of motion.      Cervical back: Normal range of motion. No rigidity.     Neurological: She is alert and oriented to person, place, and time.   Skin: Skin is warm and dry. No rash noted.   Psychiatric: She has a normal mood and affect. Her behavior is normal. Judgment and thought content normal.         ED Course   Procedures  Labs Reviewed   CBC W/ AUTO DIFFERENTIAL - Abnormal; Notable for the following components:       Result Value    RBC 5.51 (*)     MCV 81 (*)     MCH 26.0 (*)     RDW 22.5 (*)     MPV 8.7 (*)     All other components within normal limits   COMPREHENSIVE METABOLIC PANEL - Abnormal; Notable for the following components:    Total Protein 8.7 (*)     All other components within normal limits   URINALYSIS, REFLEX TO URINE CULTURE - Abnormal; Notable for the following components:    Protein, UA 1+ (*)     Leukocytes, UA Trace (*)     All other components within normal limits    Narrative:     Specimen Source->Urine   URINALYSIS MICROSCOPIC - Abnormal; Notable for the following components:    Bacteria Few (*)     All other components within normal limits    Narrative:     Specimen Source->Urine   LIPASE   POCT URINE PREGNANCY   SARS-COV-2 RDRP GENE   POCT INFLUENZA A/B MOLECULAR          Imaging Results              CT Abdomen Pelvis With IV Contrast NO Oral Contrast  (Final result)  Result time 03/30/24 19:43:40      Final result by Akbar James MD (03/30/24 19:43:40)                   Impression:      No acute intra-abdominal or pelvic process.    Umbilical hernia without bowel component.    Uterine fibroid.    Nonspecific prominent mesenteric lymph nodes.      Electronically signed by: Akbar James MD  Date:    03/30/2024  Time:    19:43               Narrative:    EXAMINATION:  CT ABDOMEN PELVIS WITH IV CONTRAST    CLINICAL HISTORY:  Abdominal pain, acute, nonlocalized;    TECHNIQUE:  Low dose axial images, sagittal and coronal reformations were obtained from the lung bases to the pubic symphysis following the IV administration of 100 mL of Omnipaque 350 .  Oral contrast was not given.    COMPARISON:  MRI pelvis dated 01/27/2024.    FINDINGS:  There are no pleural effusions.  There is no evidence of a pneumothorax.  No airspace opacity is present.  No pulmonary nodules identified.    The heart is unremarkable.  There is normal tapering of the abdominal aorta.  The celiac trunk, the SMA, and JAREK are within normal limits.  The portal veins and mesenteric veins are within normal limits.  The IVC and the remainder of the venous structures are within normal limits.    There are innumerable subcentimeter lymph nodes throughout the abdomen and pelvis addendum within the mesentery.    The esophagus, stomach, and duodenum are within normal limits.  The small bowel loops are unremarkable.  The appendix is unremarkable.  There is scattered colonic diverticula without evidence of acute diverticulitis.    The liver is unremarkable.  The gallbladder is within normal limits.  The biliary tree is within normal limits.  The spleen is unremarkable.  The pancreas is within normal limits.  The adrenal glands are unremarkable.    The kidneys are unremarkable.  The ureters and urinary bladder are within normal limits.  There is a large left-sided uterine fibroid.  The adnexal structures are  within normal limits.    There is no evidence of free fluid the abdomen or pelvis.  There is no evidence of free air.  There is no evidence of pneumatosis.  No portal venous air is identified.    The psoas margins are unremarkable.  There is an umbilical hernia containing omental fat.  The remaining abdominal wall is unremarkable.  There are degenerative changes in the osseous structures.  There is no evidence of a fracture.                                       Medications   aluminum-magnesium hydroxide-simethicone 200-200-20 mg/5 mL suspension 30 mL (30 mLs Oral Given 3/30/24 1819)   iohexoL (OMNIPAQUE 350) injection 100 mL (100 mLs Intravenous Given 3/30/24 1909)     Medical Decision Making  Differential Diagnosis includes, but is not limited to:  AAA, aortic dissection, mesenteric ischemia, perforated viscous, MI/ACS, SBO/volvulus, incarcerated/strangulated hernia, intussusception, ileus, appendicitis, cholecystitis, cholangitis, diverticulitis, esophagitis, hepatitis, nephrolithiasis, pancreatitis, gastroenteritis, colitis, IBD/IBS, biliary colic, GERD, PUD, constipation, UTI/pyelonephritis,  disorder.       Amount and/or Complexity of Data Reviewed  Labs:  Decision-making details documented in ED Course.  Radiology: ordered.    Risk  OTC drugs.  Prescription drug management.               ED Course as of 03/30/24 1957   Sat Mar 30, 2024   1705 POCT COVID-19 Rapid Screening [DT]   1705 POCT Influenza A/B Molecular [DT]   1705 CBC W/ AUTO DIFFERENTIAL(!) [DT]   1705 POCT urine pregnancy [DT]   1705 Urinalysis Microscopic(!) [DT]   1948 FINDINGS:  There are no pleural effusions.  There is no evidence of a pneumothorax.  No airspace opacity is present.  No pulmonary nodules identified.     The heart is unremarkable.  There is normal tapering of the abdominal aorta.  The celiac trunk, the SMA, and JAREK are within normal limits.  The portal veins and mesenteric veins are within normal limits.  The IVC and the  remainder of the venous structures are within normal limits.     There are innumerable subcentimeter lymph nodes throughout the abdomen and pelvis addendum within the mesentery.     The esophagus, stomach, and duodenum are within normal limits.  The small bowel loops are unremarkable.  The appendix is unremarkable.  There is scattered colonic diverticula without evidence of acute diverticulitis.     The liver is unremarkable.  The gallbladder is within normal limits.  The biliary tree is within normal limits.  The spleen is unremarkable.  The pancreas is within normal limits.  The adrenal glands are unremarkable.     The kidneys are unremarkable.  The ureters and urinary bladder are within normal limits.  There is a large left-sided uterine fibroid.  The adnexal structures are within normal limits.     There is no evidence of free fluid the abdomen or pelvis.  There is no evidence of free air.  There is no evidence of pneumatosis.  No portal venous air is identified.     The psoas margins are unremarkable.  There is an umbilical hernia containing omental fat.  The remaining abdominal wall is unremarkable.  There are degenerative changes in the osseous structures.  There is no evidence of a fracture.     Impression:     No acute intra-abdominal or pelvic process.     Umbilical hernia without bowel component.     Uterine fibroid.     Nonspecific prominent mesenteric lymph nodes.   [DT]   1954 CT reviewed and case reviewed with Dr Melendez.  Labs reassuring with a white count of 5.95 in addition to an H&H of 14.3 and 44.7.  CMP is within normal limits.  Pt will be sent a referral for follow up with gastro. She will be placed on Omeprazole tablets in addition to Bentyl and zofran as needed. Pt is not toxic in appearance. STRICT return precautions given. No vomiting or diarrhea since arrival to the Er. Stable for dc.  [DT]      ED Course User Index  [DT] Annie Landon, MICHAEL                           Clinical  Impression:  Final diagnoses:  [K29.70] Gastritis, presence of bleeding unspecified, unspecified chronicity, unspecified gastritis type (Primary)  [R10.9] Abdominal pain, unspecified abdominal location  [K42.9] Umbilical hernia without obstruction and without gangrene  [D25.9] Uterine leiomyoma, unspecified location          ED Disposition Condition    Discharge Stable          ED Prescriptions       Medication Sig Dispense Start Date End Date Auth. Provider    omeprazole (PRILOSEC) 20 MG capsule Take 1 capsule (20 mg total) by mouth once daily. 30 capsule 3/30/2024 4/29/2024 Annie Landon NP    ondansetron (ZOFRAN-ODT) 4 MG TbDL Take 1 tablet (4 mg total) by mouth every 8 (eight) hours as needed (vomiting). 9 tablet 3/30/2024 4/2/2024 Annie Landon NP    dicyclomine (BENTYL) 20 mg tablet Take 1 tablet (20 mg total) by mouth 2 (two) times daily. for 5 days 10 tablet 3/30/2024 4/4/2024 Annie Landon NP          Follow-up Information       Follow up With Specialties Details Why Contact Info    Monica Baumann MD ([facility]) Family Medicine Schedule an appointment as soon as possible for a visit in 2 days  [address] [facility] [id number] [phone]               Annie Landon NP  03/30/24 1956       Annie Landon NP  03/30/24 1957

## 2024-03-30 NOTE — FIRST PROVIDER EVALUATION
Emergency Department TeleTriage Encounter Note      CHIEF COMPLAINT    Chief Complaint   Patient presents with    Abdominal Pain     Abd cramping with fever, congestion, n/v/d since Monday; sharp pain to RUQ; hx of hernia       VITAL SIGNS   Initial Vitals [03/30/24 1603]   BP Pulse Resp Temp SpO2   (!) 172/94 78 17 98.4 °F (36.9 °C) 100 %      MAP       --            ALLERGIES    Review of patient's allergies indicates:   Allergen Reactions    Augmentin [amoxicillin-pot clavulanate] Rash    Betamethasone, augmented Diarrhea, Hives, Nausea And Vomiting and Rash       PROVIDER TRIAGE NOTE  This is a teletriage evaluation of a 28 y.o. female presenting to the ED complaining of abd pain with n/v/d since Monday. Also reports cough and congestion. No vomiting today.   Alert, no distress.     Initial orders will be placed and care will be transferred to an alternate provider when patient is roomed for a full evaluation. Any additional orders and the final disposition will be determined by that provider.         ORDERS  Labs Reviewed   INFLUENZA A & B BY MOLECULAR   CBC W/ AUTO DIFFERENTIAL   COMPREHENSIVE METABOLIC PANEL   LIPASE   URINALYSIS, REFLEX TO URINE CULTURE   POCT URINE PREGNANCY   SARS-COV-2 RDRP GENE       ED Orders (720h ago, onward)      Start Ordered     Status Ordering Provider    03/30/24 1611 03/30/24 1610  Vital signs  Every 2 hours         Ordered FANNY ROBERTSON N.    03/30/24 1611 03/30/24 1610  POCT urine pregnancy  Once         Ordered FANNY ROBERTSON N.    03/30/24 1611 03/30/24 1610  POCT COVID-19 Rapid Screening  Once         Ordered FANNY ROBERTSON N.    03/30/24 1611 03/30/24 1610  Influenza A & B by Molecular  STAT         Ordered FANNY ROBERTSON N.    03/30/24 1610 03/30/24 1610  Diet NPO  Diet effective now         Ordered FANNY ROBERTSON N.    03/30/24 1610 03/30/24 1610  Insert peripheral IV  Once         Ordered FANNY ROBERTSON N.    03/30/24  1610 03/30/24 1610  CBC W/ AUTO DIFFERENTIAL  STAT         Ordered FANNY ROBERTSON N.    03/30/24 1610 03/30/24 1610  Comp. Metabolic Panel  STAT         Ordered FANNY ROBERTSON N.    03/30/24 1610 03/30/24 1610  Lipase  STAT         Ordered SCHOTTELKOTTLINNEA, FANNY N.    03/30/24 1610 03/30/24 1610  Urinalysis, Reflex to Urine Culture Urine, Clean Catch  STAT         Ordered FANNY ROBERTSON N.              Virtual Visit Note: The provider triage portion of this emergency department evaluation and documentation was performed via [employer], a HIPAA-compliant telemedicine application, in concert with a tele-presenter in the room. A face to face patient evaluation with one of my colleagues will occur once the patient is placed in an emergency department room.      DISCLAIMER: This note was prepared with [employer] voice recognition transcription software. Garbled syntax, mangled pronouns, and other bizarre constructions may be attributed to that software system.

## 2024-03-30 NOTE — Clinical Note
"Peyton Benitez" Uyen was seen and treated in our emergency department on 3/30/2024.  She may return to work on 04/01/2024.       If you have any questions or concerns, please don't hesitate to call.      Annie Landon NP"

## 2024-03-31 NOTE — DISCHARGE INSTRUCTIONS

## 2024-04-02 ENCOUNTER — TELEPHONE (OUTPATIENT)
Dept: HEMATOLOGY/ONCOLOGY | Facility: CLINIC | Age: 29
End: 2024-04-02
Payer: COMMERCIAL

## 2024-04-07 NOTE — PROGRESS NOTES
PATIENT: Peyton Qiu  MRN: [id number]  DATE: 4/9/2024    Diagnosis:   1. Iron deficiency anemia secondary to blood loss (chronic)    2. Morbid obesity    3. Abnormal uterine bleeding      Chief Complaint: iron deficiency anemia      Subjective:    History of Present Illness: Ms. Qiu is a 28 y.o. female who presented in June 2022 for evaluation and management of iron deficiency anemia. She was referred by Dr. Gomes.    - labs since 2021 reveal a worsening microcytic anemia.  - she endorses a history of menorrhagia before she became pregnant.  - she has been taking oral iron with vitamin C. She denies side effects from oral iron.    - she received iron sucrose x 4 doses in June - August 2022.  - she gave birth to on 8/11/22  - she takes oral iron.      Interval history:  - she presents for a follow-up appointment for her iron deficiency anemia.  - she received ferric carboxymaltose x 2 doses in January/February 2024.  - today, she reports improvement in her fatigue, ice craving. She endorses diarrhea.  - She denies chest pain, nausea, vomiting, constipation.          Past medical, surgical, family, and social histories have been reviewed and updated below.    Past Medical History:   Past Medical History:   Diagnosis Date    Eczema     Seasonal allergic rhinitis     Vertigo        Past Surgical History:   Past Surgical History:   Procedure Laterality Date    removal of cyst in head  12/20/2023    WISDOM TOOTH EXTRACTION         Family History:   Family History   Problem Relation Age of Onset    Coronary artery disease Father     Diabetes Maternal Grandmother     Heart disease Maternal Grandmother     No Known Problems Mother        Social History:  reports that she has never smoked. She has never used smokeless tobacco. She reports that she does not drink alcohol and does not use drugs.    Allergies:  Review of patient's allergies indicates:   Allergen Reactions    Augmentin [amoxicillin-pot  clavulanate] Rash    Betamethasone, augmented Diarrhea, Hives, Nausea And Vomiting and Rash       Medications:  Current Outpatient Medications   Medication Sig Dispense Refill    ferrous sulfate 325 (65 FE) MG EC tablet Take 1 tablet (325 mg total) by mouth every other day. 60 tablet 3    ibuprofen (ADVIL,MOTRIN) 600 MG tablet Take 1 tablet (600 mg total) by mouth every 6 (six) hours as needed (cramping). 30 tablet 0    ketoconazole (NIZORAL) 2 % cream Apply topically 2 (two) times daily. 30 g 0    meloxicam (MOBIC) 15 MG tablet Take 1 tablet (15 mg total) by mouth once daily. 60 tablet 1    metFORMIN (GLUMETZA) 1000 MG (MOD) 24hr tablet Take 1 tablet (1,000 mg total) by mouth 2 (two) times daily with meals. 180 tablet 3    norethindrone (ORTHO MICRONOR) 0.35 mg tablet Take 1 tablet (0.35 mg total) by mouth once daily. (Patient not taking: Reported on 6/11/2023) 84 tablet 3    norethindrone-ethinyl estradiol (MICROGESTIN 1/20) 1-20 mg-mcg per tablet Take 1 tablet by mouth once daily. 84 tablet 3    norgestimate-ethinyl estradioL (ORTHO-CYCLEN) 0.25-35 mg-mcg per tablet Take 1 tablet by mouth once daily. (Patient not taking: Reported on 1/17/2024) 84 tablet 3    omeprazole (PRILOSEC) 20 MG capsule Take 1 capsule (20 mg total) by mouth once daily. 30 capsule 0    OZEMPIC 0.25 mg or 0.5 mg (2 mg/3 mL) pen injector Inject into the skin.      prenatal vit no.124/iron/folic (PRENATAL VITAMIN ORAL) Take by mouth.      triamcinolone acetonide 0.1% (KENALOG) 0.1 % ointment Apply topically 2 (two) times daily. 30 g 0     No current facility-administered medications for this visit.       Review of Systems   Constitutional:  Positive for fatigue.   HENT:  Negative for sore throat.    Eyes:  Negative for visual disturbance.   Respiratory:  Positive for shortness of breath. Negative for cough.    Cardiovascular:  Negative for chest pain.   Gastrointestinal:  Positive for diarrhea. Negative for abdominal pain, constipation, nausea  and vomiting.   Genitourinary:  Negative for dysuria.   Musculoskeletal:  Negative for back pain.   Skin:  Negative for rash.   Neurological:  Negative for headaches.   Hematological:  Negative for adenopathy.   Psychiatric/Behavioral:  The patient is not nervous/anxious.        ECOG Performance Status:   ECOG SCORE 1            Objective:      Vitals:   Vitals:    04/09/24 1455   BP: 133/76   BP Location: Right arm   Patient Position: Sitting   BP Method: Large (Automatic)   Pulse: 106   Resp: 18   SpO2: 97%   Weight: (!) 144.9 kg (319 lb 7.1 oz)     BMI: Body mass index is 54.83 kg/m².      Physical Exam  Vitals and nursing note reviewed.   Constitutional:       Appearance: She is well-developed.   HENT:      Head: Normocephalic and atraumatic.   Eyes:      Pupils: Pupils are equal, round, and reactive to light.   Cardiovascular:      Rate and Rhythm: Normal rate and regular rhythm.   Pulmonary:      Effort: Pulmonary effort is normal.      Breath sounds: Normal breath sounds.   Abdominal:      General: Bowel sounds are normal.      Palpations: Abdomen is soft.   Musculoskeletal:         General: Normal range of motion.      Cervical back: Normal range of motion and neck supple.   Skin:     General: Skin is warm and dry.   Neurological:      Mental Status: She is alert and oriented to person, place, and time.   Psychiatric:         Behavior: Behavior normal.         Thought Content: Thought content normal.         Judgment: Judgment normal.           Laboratory Data:  Labs have been reviewed.    Lab Results   Component Value Date    WBC 5.95 03/30/2024    HGB 14.3 03/30/2024    HCT 44.7 03/30/2024    MCV 81 (L) 03/30/2024     03/30/2024         10/31/23:        Imaging:    Assessment:       1. Iron deficiency anemia secondary to blood loss (chronic)    2. Reactive thrombocytosis    3. Morbid obesity    4. Abnormal uterine bleeding           Plan:     1. Iron deficiency anemia  - I have reviewed her chart  -  labs since 2021 reveal a worsening microcytic anemia.  - labs in June 2022 confirmed iron deficiency anemia  - she received iron sucrose x 4 doses in June - August 2022.  - labs in October 2023 reveal a moderate anemia with reactive thrombocytosis  - cause is menorrhagia. She has a period continuously for the past month.  - outside labs (10/31/23) revealed a ferritin at 4 ng/mL, consistent with iron deficiency anemia.  - she received ferric carboxymaltose x 2 doses in January/February 2024.  - Labs have been reviewed. Hemoglobin on 3/30/24 was 14.3 g/dL.   - follow up iron studies from today.  - continue oral iron  - return to clinic in 4 months with repeat labs.    2. Morbid obesity  - Body mass index is 54.83 kg/m².  - obesity can contribute to fatigue.    3. Advance Care Planning     Power of   After our discussion (at previous visit), the patient decided to complete a HCPOA and appointed her  Carmen Qureshi ([phone]), Caitie Sosa ([phone]), and Terrence Dash ([phone]) .      - return to clinic in 4 months with repeat labs.    Kojo Reaves M.D.  Hematology/Oncology  [facility] - [address], [address]  [place] [address]  Phone: [phone]  Fax: [phone]

## 2024-04-09 ENCOUNTER — LAB VISIT (OUTPATIENT)
Dept: LAB | Facility: HOSPITAL | Age: 29
End: 2024-04-09
Attending: INTERNAL MEDICINE
Payer: COMMERCIAL

## 2024-04-09 ENCOUNTER — OFFICE VISIT (OUTPATIENT)
Dept: HEMATOLOGY/ONCOLOGY | Facility: CLINIC | Age: 29
End: 2024-04-09
Payer: COMMERCIAL

## 2024-04-09 VITALS
HEART RATE: 106 BPM | WEIGHT: 293 LBS | BODY MASS INDEX: 54.83 KG/M2 | OXYGEN SATURATION: 97 % | SYSTOLIC BLOOD PRESSURE: 133 MMHG | RESPIRATION RATE: 18 BRPM | DIASTOLIC BLOOD PRESSURE: 76 MMHG

## 2024-04-09 DIAGNOSIS — D50.0 IRON DEFICIENCY ANEMIA SECONDARY TO BLOOD LOSS (CHRONIC): ICD-10-CM

## 2024-04-09 DIAGNOSIS — N93.9 ABNORMAL UTERINE BLEEDING: ICD-10-CM

## 2024-04-09 DIAGNOSIS — D50.0 IRON DEFICIENCY ANEMIA SECONDARY TO BLOOD LOSS (CHRONIC): Primary | ICD-10-CM

## 2024-04-09 DIAGNOSIS — E66.01 MORBID OBESITY: ICD-10-CM

## 2024-04-09 LAB
BASOPHILS # BLD AUTO: 0.04 K/UL (ref 0–0.2)
BASOPHILS # BLD AUTO: 0.04 K/UL (ref 0–0.2)
BASOPHILS NFR BLD: 0.6 % (ref 0–1.9)
BASOPHILS NFR BLD: 0.6 % (ref 0–1.9)
DIFFERENTIAL METHOD BLD: ABNORMAL
DIFFERENTIAL METHOD BLD: ABNORMAL
EOSINOPHIL # BLD AUTO: 0.2 K/UL (ref 0–0.5)
EOSINOPHIL # BLD AUTO: 0.2 K/UL (ref 0–0.5)
EOSINOPHIL NFR BLD: 2.5 % (ref 0–8)
EOSINOPHIL NFR BLD: 2.5 % (ref 0–8)
ERYTHROCYTE [DISTWIDTH] IN BLOOD BY AUTOMATED COUNT: 21.2 % (ref 11.5–14.5)
ERYTHROCYTE [DISTWIDTH] IN BLOOD BY AUTOMATED COUNT: 21.2 % (ref 11.5–14.5)
FERRITIN SERPL-MCNC: 94 NG/ML (ref 20–300)
FERRITIN SERPL-MCNC: 94 NG/ML (ref 20–300)
HCT VFR BLD AUTO: 42.5 % (ref 37–48.5)
HCT VFR BLD AUTO: 42.5 % (ref 37–48.5)
HGB BLD-MCNC: 13.4 G/DL (ref 12–16)
HGB BLD-MCNC: 13.4 G/DL (ref 12–16)
IMM GRANULOCYTES # BLD AUTO: 0.01 K/UL (ref 0–0.04)
IMM GRANULOCYTES # BLD AUTO: 0.01 K/UL (ref 0–0.04)
IMM GRANULOCYTES NFR BLD AUTO: 0.1 % (ref 0–0.5)
IMM GRANULOCYTES NFR BLD AUTO: 0.1 % (ref 0–0.5)
LYMPHOCYTES # BLD AUTO: 2.2 K/UL (ref 1–4.8)
LYMPHOCYTES # BLD AUTO: 2.2 K/UL (ref 1–4.8)
LYMPHOCYTES NFR BLD: 32.5 % (ref 18–48)
LYMPHOCYTES NFR BLD: 32.5 % (ref 18–48)
MCH RBC QN AUTO: 26 PG (ref 27–31)
MCH RBC QN AUTO: 26 PG (ref 27–31)
MCHC RBC AUTO-ENTMCNC: 31.5 G/DL (ref 32–36)
MCHC RBC AUTO-ENTMCNC: 31.5 G/DL (ref 32–36)
MCV RBC AUTO: 83 FL (ref 82–98)
MCV RBC AUTO: 83 FL (ref 82–98)
MONOCYTES # BLD AUTO: 0.5 K/UL (ref 0.3–1)
MONOCYTES # BLD AUTO: 0.5 K/UL (ref 0.3–1)
MONOCYTES NFR BLD: 6.9 % (ref 4–15)
MONOCYTES NFR BLD: 6.9 % (ref 4–15)
NEUTROPHILS # BLD AUTO: 3.8 K/UL (ref 1.8–7.7)
NEUTROPHILS # BLD AUTO: 3.8 K/UL (ref 1.8–7.7)
NEUTROPHILS NFR BLD: 57.4 % (ref 38–73)
NEUTROPHILS NFR BLD: 57.4 % (ref 38–73)
NRBC BLD-RTO: 0 /100 WBC
NRBC BLD-RTO: 0 /100 WBC
PLATELET # BLD AUTO: 350 K/UL (ref 150–450)
PLATELET # BLD AUTO: 350 K/UL (ref 150–450)
PMV BLD AUTO: 9 FL (ref 9.2–12.9)
PMV BLD AUTO: 9 FL (ref 9.2–12.9)
RBC # BLD AUTO: 5.15 M/UL (ref 4–5.4)
RBC # BLD AUTO: 5.15 M/UL (ref 4–5.4)
WBC # BLD AUTO: 6.68 K/UL (ref 3.9–12.7)
WBC # BLD AUTO: 6.68 K/UL (ref 3.9–12.7)

## 2024-04-09 PROCEDURE — 85025 COMPLETE CBC W/AUTO DIFF WBC: CPT | Performed by: INTERNAL MEDICINE

## 2024-04-09 PROCEDURE — 36415 COLL VENOUS BLD VENIPUNCTURE: CPT | Performed by: INTERNAL MEDICINE

## 2024-04-09 PROCEDURE — 3075F SYST BP GE 130 - 139MM HG: CPT | Mod: CPTII,S$GLB,, | Performed by: INTERNAL MEDICINE

## 2024-04-09 PROCEDURE — 1160F RVW MEDS BY RX/DR IN RCRD: CPT | Mod: CPTII,S$GLB,, | Performed by: INTERNAL MEDICINE

## 2024-04-09 PROCEDURE — 1159F MED LIST DOCD IN RCRD: CPT | Mod: CPTII,S$GLB,, | Performed by: INTERNAL MEDICINE

## 2024-04-09 PROCEDURE — 83540 ASSAY OF IRON: CPT | Performed by: INTERNAL MEDICINE

## 2024-04-09 PROCEDURE — 99999 PR PBB SHADOW E&M-EST. PATIENT-LVL III: CPT | Mod: PBBFAC,,, | Performed by: INTERNAL MEDICINE

## 2024-04-09 PROCEDURE — 3078F DIAST BP <80 MM HG: CPT | Mod: CPTII,S$GLB,, | Performed by: INTERNAL MEDICINE

## 2024-04-09 PROCEDURE — 3008F BODY MASS INDEX DOCD: CPT | Mod: CPTII,S$GLB,, | Performed by: INTERNAL MEDICINE

## 2024-04-09 PROCEDURE — 99214 OFFICE O/P EST MOD 30 MIN: CPT | Mod: S$GLB,,, | Performed by: INTERNAL MEDICINE

## 2024-04-09 PROCEDURE — 82728 ASSAY OF FERRITIN: CPT | Performed by: INTERNAL MEDICINE

## 2024-04-10 LAB
IRON SERPL-MCNC: 57 UG/DL (ref 30–160)
IRON SERPL-MCNC: 57 UG/DL (ref 30–160)
SATURATED IRON: 16 % (ref 20–50)
SATURATED IRON: 16 % (ref 20–50)
TOTAL IRON BINDING CAPACITY: 358 UG/DL (ref 250–450)
TOTAL IRON BINDING CAPACITY: 358 UG/DL (ref 250–450)
TRANSFERRIN SERPL-MCNC: 242 MG/DL (ref 200–375)
TRANSFERRIN SERPL-MCNC: 242 MG/DL (ref 200–375)

## 2024-04-29 ENCOUNTER — OFFICE VISIT (OUTPATIENT)
Dept: GASTROENTEROLOGY | Facility: CLINIC | Age: 29
End: 2024-04-29
Payer: COMMERCIAL

## 2024-04-29 VITALS — WEIGHT: 293 LBS | BODY MASS INDEX: 55.21 KG/M2

## 2024-04-29 DIAGNOSIS — R15.2 INCONTINENCE OF FECES WITH FECAL URGENCY: ICD-10-CM

## 2024-04-29 DIAGNOSIS — R15.9 INCONTINENCE OF FECES WITH FECAL URGENCY: ICD-10-CM

## 2024-04-29 DIAGNOSIS — R10.13 DYSPEPSIA: Primary | ICD-10-CM

## 2024-04-29 PROCEDURE — 99999 PR PBB SHADOW E&M-EST. PATIENT-LVL IV: CPT | Mod: PBBFAC,,,

## 2024-04-29 PROCEDURE — 3008F BODY MASS INDEX DOCD: CPT | Mod: CPTII,S$GLB,,

## 2024-04-29 PROCEDURE — 99204 OFFICE O/P NEW MOD 45 MIN: CPT | Mod: S$GLB,,,

## 2024-04-29 RX ORDER — FAMOTIDINE 40 MG/1
40 TABLET, FILM COATED ORAL DAILY PRN
Qty: 90 TABLET | Refills: 3 | Status: SHIPPED | OUTPATIENT
Start: 2024-04-29 | End: 2025-04-29

## 2024-04-29 NOTE — PROGRESS NOTES
GASTROENTEROLOGY CLINIC NOTE    Reason for visit: The primary encounter diagnosis was Incontinence of feces with fecal urgency. Diagnoses of Dyspepsia and Abdominal pain, unspecified abdominal location were also pertinent to this visit.  Referring provider/PCP: Monica Baumann MD (Cindy)    HPI:  Peyton Qiu is a 28 y.o. female here today for ER f/u. She presented to the ER last month with egg burps, vomiting and diarrhea. CT completed without any GI related abnormalities with exception of known umbilical hernia. She was prescribed bentyl, zofran, and omeprazole. She reports no longer having vomiting. Does still get egg burps and nausea, had yesterday. Some associated reflux, typically at night. She takes ozempic on Saturday's. Tried vidhya seltzer yesterday which helped. She reports having intermittent fecal incontinence. Normally has daily BM's without issue. Had two episodes with urgency and incontinence. She reports also noting blood with second occurrence. Did have urinary incontinence after childbirth, saw PFPT, some improvement in urination. FH of chron's disease- mother.     Prior Endoscopy:  EGD:  Colon:    (Portions of this note were dictated using voice recognition software and may contain dictation related errors in spelling/grammar/syntax not found on text review)    Review of Systems   Gastrointestinal:  Positive for heartburn. Negative for constipation and diarrhea.       Past Medical History: has a past medical history of Eczema, Seasonal allergic rhinitis, and Vertigo.    Past Surgical History: has a past surgical history that includes Hulen tooth extraction and removal of cyst in head (12/20/2023).    Home medications:   Current Outpatient Medications on File Prior to Visit   Medication Sig Dispense Refill    ferrous sulfate 325 (65 FE) MG EC tablet Take 1 tablet (325 mg total) by mouth every other day. 60 tablet 3    ibuprofen (ADVIL,MOTRIN) 600 MG tablet Take 1 tablet  (600 mg total) by mouth every 6 (six) hours as needed (cramping). 30 tablet 0    ketoconazole (NIZORAL) 2 % cream Apply topically 2 (two) times daily. 30 g 0    meloxicam (MOBIC) 15 MG tablet Take 1 tablet (15 mg total) by mouth once daily. 60 tablet 1    metFORMIN (GLUMETZA) 1000 MG (MOD) 24hr tablet Take 1 tablet (1,000 mg total) by mouth 2 (two) times daily with meals. 180 tablet 3    norethindrone-ethinyl estradiol (MICROGESTIN 1/20) 1-20 mg-mcg per tablet Take 1 tablet by mouth once daily. 84 tablet 3    norgestimate-ethinyl estradioL (ORTHO-CYCLEN) 0.25-35 mg-mcg per tablet Take 1 tablet by mouth once daily. 84 tablet 3    OZEMPIC 0.25 mg or 0.5 mg (2 mg/3 mL) pen injector Inject into the skin.      prenatal vit no.124/iron/folic (PRENATAL VITAMIN ORAL) Take by mouth.      triamcinolone acetonide 0.1% (KENALOG) 0.1 % ointment Apply topically 2 (two) times daily. 30 g 0    [DISCONTINUED] omeprazole (PRILOSEC) 20 MG capsule Take 1 capsule (20 mg total) by mouth once daily. 30 capsule 0    norethindrone (ORTHO MICRONOR) 0.35 mg tablet Take 1 tablet (0.35 mg total) by mouth once daily. (Patient not taking: Reported on 6/11/2023) 84 tablet 3     No current facility-administered medications on file prior to visit.       Vital signs:  Wt (!) 145.9 kg (321 lb 10.4 oz)   LMP 04/12/2024   BMI 55.21 kg/m²     Physical Exam  Constitutional:       Appearance: Normal appearance. She is obese.   Abdominal:      General: There is no distension.   Neurological:      Mental Status: She is alert.         Results for orders placed or performed during the hospital encounter of 03/30/24 (from the past 2160 hour(s))   CT Abdomen Pelvis With IV Contrast NO Oral Contrast    Narrative    EXAMINATION:  CT ABDOMEN PELVIS WITH IV CONTRAST    CLINICAL HISTORY:  Abdominal pain, acute, nonlocalized;    TECHNIQUE:  Low dose axial images, sagittal and coronal reformations were obtained from the lung bases to the pubic symphysis following the  IV administration of 100 mL of Omnipaque 350 .  Oral contrast was not given.    COMPARISON:  MRI pelvis dated 01/27/2024.    FINDINGS:  There are no pleural effusions.  There is no evidence of a pneumothorax.  No airspace opacity is present.  No pulmonary nodules identified.    The heart is unremarkable.  There is normal tapering of the abdominal aorta.  The celiac trunk, the SMA, and JAREK are within normal limits.  The portal veins and mesenteric veins are within normal limits.  The IVC and the remainder of the venous structures are within normal limits.    There are innumerable subcentimeter lymph nodes throughout the abdomen and pelvis addendum within the mesentery.    The esophagus, stomach, and duodenum are within normal limits.  The small bowel loops are unremarkable.  The appendix is unremarkable.  There is scattered colonic diverticula without evidence of acute diverticulitis.    The liver is unremarkable.  The gallbladder is within normal limits.  The biliary tree is within normal limits.  The spleen is unremarkable.  The pancreas is within normal limits.  The adrenal glands are unremarkable.    The kidneys are unremarkable.  The ureters and urinary bladder are within normal limits.  There is a large left-sided uterine fibroid.  The adnexal structures are within normal limits.    There is no evidence of free fluid the abdomen or pelvis.  There is no evidence of free air.  There is no evidence of pneumatosis.  No portal venous air is identified.    The psoas margins are unremarkable.  There is an umbilical hernia containing omental fat.  The remaining abdominal wall is unremarkable.  There are degenerative changes in the osseous structures.  There is no evidence of a fracture.      Impression    No acute intra-abdominal or pelvic process.    Umbilical hernia without bowel component.    Uterine fibroid.    Nonspecific prominent mesenteric lymph nodes.      Electronically signed by: Akbar James  MD  Date:    03/30/2024  Time:    19:43      I have reviewed associated labs, imaging and notes.     Assessment:  1. Incontinence of feces with fecal urgency    2. Dyspepsia    3. Abdominal pain, unspecified abdominal location    Dyspepsia   Egg smelling burps and nausea intermittently   Rosemarie-seltzer helps  Fecal incontinence   2 episodes recently   Normal BM's otherwise    Dyspeptic symptoms seem to occur shortly after ozempic dose, suspect contributing factor. Trial pepcid on these days. Review GERD handout, try eating smaller dinner. Two episodes of fecal incontinence. Will collect stool studies to r/o pathogenic/inflammatory factors. Possibly related to pelvic floor dysfunction, did have urinary incontinence after childbirth, saw PFPT. Although no day-to-day issues with BM's. 1 episode of rectal bleeding, aware to reach out if bleeding recurs.     Plan:  Orders Placed This Encounter    Stool culture    Calprotectin, Stool    Giardia / Cryptosporidum, EIA    Rotavirus antigen, stool    Stool Exam-Ova,Cysts,Parasites    WBC, Stool    famotidine (PEPCID) 40 MG tablet     Collect stool studies  Start daily fiber supplement    Trial pepcid  Review GERD dietary/lifestyle interventions    Lindsey Ray, NP [facility] [place]

## 2024-04-29 NOTE — LETTER
April 29, 2024      [facility]  [address]  [address]  [place] [address]  Phone: [phone]       Patient: Peyton Qiu   YOB: 1995  Date of Visit: 04/29/2024    To Whom It May Concern:    Mehnaz Qiu  was at [facility] on 04/29/2024. The patient may return to work on 4/29/24 with no restrictions. If you have any questions or concerns, or if I can be of further assistance, please do not hesitate to contact me.    Sincerely,    Fatou Benson MA

## 2024-05-02 ENCOUNTER — OFFICE VISIT (OUTPATIENT)
Dept: OBSTETRICS AND GYNECOLOGY | Facility: CLINIC | Age: 29
End: 2024-05-02
Payer: COMMERCIAL

## 2024-05-02 VITALS — SYSTOLIC BLOOD PRESSURE: 115 MMHG | DIASTOLIC BLOOD PRESSURE: 77 MMHG | BODY MASS INDEX: 55.25 KG/M2 | WEIGHT: 293 LBS

## 2024-05-02 DIAGNOSIS — E66.01 MORBID OBESITY WITH BMI OF 50.0-59.9, ADULT: ICD-10-CM

## 2024-05-02 DIAGNOSIS — D25.1 INTRAMURAL LEIOMYOMA OF UTERUS: Primary | ICD-10-CM

## 2024-05-02 PROBLEM — E88.819 INSULIN RESISTANCE: Status: RESOLVED | Noted: 2024-01-20 | Resolved: 2024-05-02

## 2024-05-02 PROBLEM — E28.2 PCOS (POLYCYSTIC OVARIAN SYNDROME): Status: RESOLVED | Noted: 2020-11-12 | Resolved: 2024-05-02

## 2024-05-02 PROBLEM — O99.212 OBESITY AFFECTING PREGNANCY IN SECOND TRIMESTER: Status: RESOLVED | Noted: 2022-04-20 | Resolved: 2024-05-02

## 2024-05-02 PROBLEM — E66.813 CLASS 3 SEVERE OBESITY WITH BODY MASS INDEX (BMI) OF 50.0 TO 59.9 IN ADULT: Status: RESOLVED | Noted: 2022-01-20 | Resolved: 2024-05-02

## 2024-05-02 PROCEDURE — 3078F DIAST BP <80 MM HG: CPT | Mod: CPTII,S$GLB,, | Performed by: OBSTETRICS & GYNECOLOGY

## 2024-05-02 PROCEDURE — 3008F BODY MASS INDEX DOCD: CPT | Mod: CPTII,S$GLB,, | Performed by: OBSTETRICS & GYNECOLOGY

## 2024-05-02 PROCEDURE — 1160F RVW MEDS BY RX/DR IN RCRD: CPT | Mod: CPTII,S$GLB,, | Performed by: OBSTETRICS & GYNECOLOGY

## 2024-05-02 PROCEDURE — 1159F MED LIST DOCD IN RCRD: CPT | Mod: CPTII,S$GLB,, | Performed by: OBSTETRICS & GYNECOLOGY

## 2024-05-02 PROCEDURE — 99999 PR PBB SHADOW E&M-EST. PATIENT-LVL III: CPT | Mod: PBBFAC,,, | Performed by: OBSTETRICS & GYNECOLOGY

## 2024-05-02 PROCEDURE — 99214 OFFICE O/P EST MOD 30 MIN: CPT | Mod: S$GLB,,, | Performed by: OBSTETRICS & GYNECOLOGY

## 2024-05-02 PROCEDURE — 3074F SYST BP LT 130 MM HG: CPT | Mod: CPTII,S$GLB,, | Performed by: OBSTETRICS & GYNECOLOGY

## 2024-05-02 NOTE — PROGRESS NOTES
CC: Symptoms related to fibroids    Peyton Qiu is a 28 y.o. female  presents for a consultation for management of fibroids.      She reports cycles are regular now as she has lost weight. Prior to that, her cycle was heavy and lasting a long time. She became anemic and received IV iron.  She skipped a cycle in January and February but March and April cycle was normal. She denies pelvic pressure and fullness.     MRI shows:    FINDINGS:  UTERUS:     Anteverted measuring 10.5 cm cc (series 5, image 25) by 8.4 x 5.9 cm transverse (series 11, image 30.     Fibroids: There is a large exophytic fibroid arising from the left body measuring 5.3 cm (series 14, image 31).  Homogeneous enhancement noted.     Endometrium normal thickness measuring 1.0 cm (series 5, image 25)     The junctional zone is asymmetrically thickened near the fundus measuring 1.9 cm (series 11, image 29), suggesting a component of adenomyosis..     Small nabothian cysts noted.     No evidence of endometriosis.     OVARIES:     Normal in size and appearance.The right ovary measures 3.1 cm (series 5, image 27).The left ovary measures 2.9 cm (series 5, image 16).  Multiple ovarian follicles noted.     No inguinal or pelvic lymphadenopathy.     The bladder is unremarkable.No fluid collections.No marrow replacement process.The muscle signal is within normal limits     There is a moderate sized fat containing supraumbilical hernia.     Impression:     Large (5.3 cm) uterine fibroid, as above.          Past Medical History:   Diagnosis Date    Class 3 severe obesity with body mass index (BMI) of 50.0 to 59.9 in adult 2022    Eczema     Insulin resistance 2024    PCOS (polycystic ovarian syndrome) 2020    Seasonal allergic rhinitis     Vertigo        Past Surgical History:   Procedure Laterality Date    removal of cyst in head  2023    WISDOM TOOTH EXTRACTION         Family History   Problem Relation Name Age  of Onset    Breast cancer Paternal Grandmother      Diabetes Maternal Grandmother      Heart disease Maternal Grandmother      Coronary artery disease Father      No Known Problems Mother      Colon cancer Neg Hx      Ovarian cancer Neg Hx         Social History     Tobacco Use    Smoking status: Never    Smokeless tobacco: Never   Substance Use Topics    Alcohol use: No    Drug use: Never       OB History    Para Term  AB Living   1 1 1 0 0 1   SAB IAB Ectopic Multiple Live Births   0 0 0 0 1      # Outcome Date GA Lbr Zaid/2nd Weight Sex Type Anes PTL Lv   1 Term 22 39w3d / 00:20 3.126 kg (6 lb 14.3 oz) M Vag-Vacuum EPI N KEKE      Complications: Fetal Intolerance       /77   Wt (!) 146 kg (321 lb 14 oz)   LMP 2024 (Exact Date)   Breastfeeding No   BMI 55.25 kg/m²     ROS:  GENERAL: Denies weight gain or weight loss. Feeling well overall.   SKIN: Denies rash or lesions.   HEAD: Denies head injury or headache.   NODES: Denies enlarged lymph nodes.   CHEST: Denies chest pain or shortness of breath.   CARDIOVASCULAR: Denies palpitations or left sided chest pain.   ABDOMEN: No abdominal pain, constipation, diarrhea, nausea, vomiting or rectal bleeding.   URINARY: No frequency, dysuria, hematuria, or burning on urination.  REPRODUCTIVE: See HPI.   HEMATOLOGIC: No easy bruisability or excessive bleeding with the exception of menstrual cycles.  MUSCULOSKELETAL: Denies joint pain or swelling.   NEUROLOGIC: Denies syncope or weakness.   PSYCHIATRIC: Denies depression, anxiety or mood swings.    PHYSICAL EXAM:  APPEARANCE: Well nourished, well developed, in no acute distress.  AFFECT: WNL, alert and oriented x 3  SKIN: No acne or hirsutism  NECK: Neck symmetric without masses or thyromegaly  NODES: No inguinal, cervical, axillary, or femoral lymph node enlargement  CHEST: Good respiratory effect  ABDOMEN: Soft.  No tenderness or masses.  No hepatosplenomegaly.  No hernias.  PELVIC: Normal  external genitalia without lesions.  Normal hair distribution.  Adequate perineal body, normal urethral meatus.  Vagina moist and well rugated without lesions or discharge.  Cervix pink, without lesions, discharge or tenderness.  No significant cystocele or rectocele.  Bimanual exam shows uterus to be 8 wee size, regular, mobile and nontender but difficult to assess due to body habitus.  Adnexa without masses or tenderness but difficult to assess due to body habitus.    EXTREMITIES: No edema.      ICD-10-CM ICD-9-CM    1. Intramural leiomyoma of uterus  D25.1 218.1       2. Morbid obesity with BMI of 50.0-59.9, adult  E66.01 278.01     Z68.43 V85.43           Discussed treatment options for fibroids. As she is asymptomatic, we agree with expectant management. Discussed HMB was most likely due to BMI. If symptoms return, patient will contact office to re-discuss treatment options.

## 2024-05-21 ENCOUNTER — CLINICAL SUPPORT (OUTPATIENT)
Dept: FAMILY MEDICINE | Facility: CLINIC | Age: 29
End: 2024-05-21

## 2024-05-21 DIAGNOSIS — Z00.00 ROUTINE ADULT HEALTH MAINTENANCE: Primary | ICD-10-CM

## 2024-05-21 PROCEDURE — 80305 DRUG TEST PRSMV DIR OPT OBS: CPT | Mod: S$GLB,,, | Performed by: FAMILY MEDICINE

## 2024-05-21 PROCEDURE — 86580 TB INTRADERMAL TEST: CPT | Mod: S$GLB,,, | Performed by: FAMILY MEDICINE

## 2024-05-21 PROCEDURE — 99202 OFFICE O/P NEW SF 15 MIN: CPT | Mod: S$GLB,,, | Performed by: FAMILY MEDICINE

## 2024-05-21 NOTE — PROGRESS NOTES
Peyton has presented today for Pre-Hire screening on behalf of [facility]'s Office. Peyton Qiu has completed Non-DOT Physical and Non-DOT Drug Screen . Short physical    Alea Irby

## 2024-11-05 ENCOUNTER — HOSPITAL ENCOUNTER (EMERGENCY)
Facility: HOSPITAL | Age: 29
Discharge: HOME | End: 2024-11-05
Attending: EMERGENCY MEDICINE
Payer: COMMERCIAL

## 2024-11-05 VITALS
WEIGHT: 293 LBS | HEIGHT: 64 IN | BODY MASS INDEX: 50.02 KG/M2 | TEMPERATURE: 98 F | OXYGEN SATURATION: 100 % | SYSTOLIC BLOOD PRESSURE: 154 MMHG | HEART RATE: 78 BPM | DIASTOLIC BLOOD PRESSURE: 89 MMHG | RESPIRATION RATE: 18 BRPM

## 2024-11-05 DIAGNOSIS — I10 HYPERTENSION, UNSPECIFIED TYPE: ICD-10-CM

## 2024-11-05 DIAGNOSIS — R22.42 LOCALIZED SWELLING OF LEFT LOWER LEG: ICD-10-CM

## 2024-11-05 DIAGNOSIS — M79.89 LEG SWELLING: Primary | ICD-10-CM

## 2024-11-05 DIAGNOSIS — E88.09 HYPOALBUMINEMIA: ICD-10-CM

## 2024-11-05 LAB
ALBUMIN SERPL BCP-MCNC: 3.3 G/DL (ref 3.5–5.2)
ALP SERPL-CCNC: 62 U/L (ref 40–150)
ALT SERPL W/O P-5'-P-CCNC: 24 U/L (ref 10–44)
ANION GAP SERPL CALC-SCNC: 12 MMOL/L (ref 8–16)
AST SERPL-CCNC: 21 U/L (ref 10–40)
B-HCG UR QL: NEGATIVE
BASOPHILS # BLD AUTO: 0.03 K/UL (ref 0–0.2)
BASOPHILS NFR BLD: 0.4 % (ref 0–1.9)
BILIRUB SERPL-MCNC: 0.1 MG/DL (ref 0.1–1)
BILIRUB UR QL STRIP: NEGATIVE
BUN SERPL-MCNC: 11 MG/DL (ref 6–20)
CALCIUM SERPL-MCNC: 9.3 MG/DL (ref 8.7–10.5)
CHLORIDE SERPL-SCNC: 105 MMOL/L (ref 95–110)
CLARITY UR: CLEAR
CO2 SERPL-SCNC: 22 MMOL/L (ref 23–29)
COLOR UR: YELLOW
CREAT SERPL-MCNC: 0.8 MG/DL (ref 0.5–1.4)
CTP QC/QA: YES
DIFFERENTIAL METHOD BLD: ABNORMAL
EOSINOPHIL # BLD AUTO: 0.2 K/UL (ref 0–0.5)
EOSINOPHIL NFR BLD: 2.4 % (ref 0–8)
ERYTHROCYTE [DISTWIDTH] IN BLOOD BY AUTOMATED COUNT: 14.6 % (ref 11.5–14.5)
EST. GFR  (NO RACE VARIABLE): >60 ML/MIN/1.73 M^2
GLUCOSE SERPL-MCNC: 109 MG/DL (ref 70–110)
GLUCOSE UR QL STRIP: NEGATIVE
HCT VFR BLD AUTO: 39 % (ref 37–48.5)
HGB BLD-MCNC: 12.7 G/DL (ref 12–16)
HGB UR QL STRIP: NEGATIVE
IMM GRANULOCYTES # BLD AUTO: 0.02 K/UL (ref 0–0.04)
IMM GRANULOCYTES NFR BLD AUTO: 0.3 % (ref 0–0.5)
KETONES UR QL STRIP: NEGATIVE
LEUKOCYTE ESTERASE UR QL STRIP: NEGATIVE
LYMPHOCYTES # BLD AUTO: 2.1 K/UL (ref 1–4.8)
LYMPHOCYTES NFR BLD: 28.3 % (ref 18–48)
MCH RBC QN AUTO: 27.7 PG (ref 27–31)
MCHC RBC AUTO-ENTMCNC: 32.6 G/DL (ref 32–36)
MCV RBC AUTO: 85 FL (ref 82–98)
MONOCYTES # BLD AUTO: 0.5 K/UL (ref 0.3–1)
MONOCYTES NFR BLD: 6.1 % (ref 4–15)
NEUTROPHILS # BLD AUTO: 4.6 K/UL (ref 1.8–7.7)
NEUTROPHILS NFR BLD: 62.5 % (ref 38–73)
NITRITE UR QL STRIP: NEGATIVE
NRBC BLD-RTO: 0 /100 WBC
PH UR STRIP: 6 [PH] (ref 5–8)
PLATELET # BLD AUTO: 349 K/UL (ref 150–450)
PMV BLD AUTO: 9.1 FL (ref 9.2–12.9)
POTASSIUM SERPL-SCNC: 3.7 MMOL/L (ref 3.5–5.1)
PROT SERPL-MCNC: 7.5 G/DL (ref 6–8.4)
PROT UR QL STRIP: NEGATIVE
RBC # BLD AUTO: 4.58 M/UL (ref 4–5.4)
SODIUM SERPL-SCNC: 139 MMOL/L (ref 136–145)
SP GR UR STRIP: 1.03 (ref 1–1.03)
URN SPEC COLLECT METH UR: NORMAL
UROBILINOGEN UR STRIP-ACNC: NEGATIVE EU/DL
WBC # BLD AUTO: 7.35 K/UL (ref 3.9–12.7)

## 2024-11-05 PROCEDURE — 99284 EMERGENCY DEPT VISIT MOD MDM: CPT | Mod: 25

## 2024-11-05 PROCEDURE — 81003 URINALYSIS AUTO W/O SCOPE: CPT

## 2024-11-05 PROCEDURE — 81025 URINE PREGNANCY TEST: CPT | Performed by: NURSE PRACTITIONER

## 2024-11-05 PROCEDURE — 85025 COMPLETE CBC W/AUTO DIFF WBC: CPT | Performed by: NURSE PRACTITIONER

## 2024-11-05 PROCEDURE — 80053 COMPREHEN METABOLIC PANEL: CPT | Performed by: NURSE PRACTITIONER

## 2024-11-05 NOTE — ED NOTES
Patient arrived to ED via private vehicle with complaints of swelling to L foot x several days and swelling increasing up L ankle and leg. + 2 edema LLE. + 1 Bilateral pedal pulses. Denies pain, numbness, tingling. Ambulatory steady gait. Denies injury or trauma. Reports wearing boots often for work. Denies use of birth control.     APPEARANCE: Alert, oriented and in no acute distress.  CARDIAC: Normal rate and rhythm, no murmur heard.   PERIPHERAL VASCULAR: + 1 pedal pulses. LLE +2 edema. CMS intact.   RESPIRATORY:Normal rate and effort, breath sounds clear bilaterally throughout chest. Respirations are equal and unlabored no obvious signs of distress.  GASTRO: soft, bowel sounds normal, no tenderness, no abdominal distention.  MUSC: Full ROM. No bony tenderness or soft tissue tenderness. No obvious deformity.  SKIN: Skin is warm and dry, normal skin turgor, mucous membranes moist.  NEURO: 5/5 strength major flexors/extensors bilaterally. Sensory intact to light touch bilaterally. Erlin coma scale: eyes open spontaneously-4, oriented & converses-5, obeys commands-6. No neurological abnormalities.   MENTAL STATUS: awake, alert and aware of environment.  EYE: PERRL, both eyes: pupils brisk and reactive to light. Normal size.  ENT: EARS: no obvious drainage. NOSE: no active bleeding.

## 2024-11-05 NOTE — FIRST PROVIDER EVALUATION
" Emergency Department TeleTriage Encounter Note      CHIEF COMPLAINT    Chief Complaint   Patient presents with    Foot Swelling     Left foot swelling x 2 days that has spread to ankle and calf. Also complaining of headache x 1.5 days. Hx of pre-eclampsia. Took tylenol at 0500 without relief. No pain to left leg, just "tightness". No trauma to leg or foot.       VITAL SIGNS   Initial Vitals [11/05/24 1614]   BP Pulse Resp Temp SpO2   (!) 158/76 94 18 98.6 °F (37 °C) 99 %      MAP       --            ALLERGIES    Review of patient's allergies indicates:   Allergen Reactions    Augmentin [amoxicillin-pot clavulanate] Rash    Betamethasone, augmented Diarrhea, Hives, Nausea And Vomiting and Rash       PROVIDER TRIAGE NOTE  Verbal consent for the teletriage evaluation was given by the patient at the start of the evaluation.  All efforts will be made to maintain patient's privacy during the evaluation.      This is a teletriage evaluation of a 28 y.o. female presenting to the ED with c/o left leg swelling from left foot to calf for 2 days. Denies recent trauma, injury, or travel.  Limited physical exam via telehealth: The patient is awake, alert, answering questions appropriately and is not in respiratory distress.  As the Teletriage provider, I performed an initial assessment and ordered appropriate labs and imaging studies, if any, to facilitate the patient's care once placed in the ED. Once a room is available, care and a full evaluation will be completed by an alternate ED provider.  Any additional orders and the final disposition will be determined by that provider.  All imaging and labs will not be followed-up by the Teletriage Team, including myself.         ORDERS  Labs Reviewed - No data to display    ED Orders (720h ago, onward)      Start Ordered     Status Ordering Provider    Unscheduled 11/05/24 1619  CBC Auto Differential  STAT         Ordered EVA ZIMMERMAN    Unscheduled 11/05/24 1619  Comprehensive " Metabolic Panel  STAT         Ordered EVA ZIMMERMAN    Unscheduled 11/05/24 1619  US Lower Extremity Veins Left  1 time imaging         Ordered EVA ZIMMERMAN    Unscheduled 11/05/24 1619  Saline lock IV  Once         Ordered EVA ZIMMERMAN    Unscheduled 11/05/24 1619  POCT urine pregnancy  Once         Ordered EVA ZIMMERMAN              Virtual Visit Note: The provider triage portion of this emergency department evaluation and documentation was performed via [employer], a HIPAA-compliant telemedicine application, in concert with a tele-presenter in the room. A face to face patient evaluation with one of my colleagues will occur once the patient is placed in an emergency department room.      DISCLAIMER: This note was prepared with Tw[employer] voice recognition transcription software. Garbled syntax, mangled pronouns, and other bizarre constructions may be attributed to that software system.

## 2024-11-06 NOTE — ED PROVIDER NOTES
"Encounter Date: 11/5/2024       History     Chief Complaint   Patient presents with    Foot Swelling     Left foot swelling x 2 days that has spread to ankle and calf. Also complaining of headache x 1.5 days. Hx of pre-eclampsia. Took tylenol at 0500 without relief. No pain to left leg, just "tightness". No trauma to leg or foot.     Peyton Qiu is a 28 y.o. female who has a past medical history of Class 3 severe obesity with body mass index (BMI) of 50.0 to 59.9 in adult, Eczema, Insulin resistance, PCOS (polycystic ovarian syndrome), Seasonal allergic rhinitis, and Vertigo. presenting to the Emergency Department for leg swelling.  Patient reports she started with swelling to the left foot 2-3 days ago that has spread to the ankle and calf.  She reports the calf feels very tight, but not particularly painful.  There is no erythema, warmth.  No involvement of the right extremity. She denies any history of blood clot, recent surgery, trauma, stasis, hormone use.  No chest pain or shortness of breath.  She is very active, on her feet for long periods with work.  No urinary symptoms.  No history of kidney disease, CHF. No CP, SOB.        The history is provided by the patient.     Review of patient's allergies indicates:   Allergen Reactions    Augmentin [amoxicillin-pot clavulanate] Rash    Betamethasone, augmented Diarrhea, Hives, Nausea And Vomiting and Rash     Past Medical History:   Diagnosis Date    Class 3 severe obesity with body mass index (BMI) of 50.0 to 59.9 in adult 01/20/2022    Eczema     Insulin resistance 01/20/2024    PCOS (polycystic ovarian syndrome) 11/12/2020    Seasonal allergic rhinitis     Vertigo      Past Surgical History:   Procedure Laterality Date    removal of cyst in head  12/20/2023    WISDOM TOOTH EXTRACTION       Family History   Problem Relation Name Age of Onset    Breast cancer Paternal Grandmother      Diabetes Maternal Grandmother      Heart disease Maternal " Grandmother      Coronary artery disease Father      No Known Problems Mother      Colon cancer Neg Hx      Ovarian cancer Neg Hx       Social History     Tobacco Use    Smoking status: Never    Smokeless tobacco: Never   Substance Use Topics    Alcohol use: No    Drug use: Never     Review of Systems   Constitutional:  Negative for chills and fever.   Respiratory:  Negative for cough and shortness of breath.    Cardiovascular:  Positive for leg swelling. Negative for chest pain and palpitations.   Gastrointestinal:  Negative for abdominal pain, nausea and vomiting.   Musculoskeletal:  Negative for gait problem and myalgias.   Neurological:  Negative for weakness, light-headedness, numbness and headaches.   Psychiatric/Behavioral:  Negative for agitation and confusion.        Physical Exam     Initial Vitals [11/05/24 1614]   BP Pulse Resp Temp SpO2   (!) 158/76 94 18 98.6 °F (37 °C) 99 %      MAP       --         Physical Exam    Nursing note and vitals reviewed.  Constitutional: She appears well-developed and well-nourished. She is not diaphoretic.  Non-toxic appearance. No distress.   HENT:   Head: Normocephalic and atraumatic.   Right Ear: Hearing and external ear normal.   Left Ear: Hearing and external ear normal.   Eyes: EOM are normal.   Neck: Neck supple.   Normal range of motion.  Cardiovascular:  Normal rate.           Pulmonary/Chest: No respiratory distress.   Abdominal: She exhibits no distension.   Musculoskeletal:      Cervical back: Normal range of motion and neck supple. Normal range of motion.      Comments: Left calf and ankle mildly edematous, nonpitting.  Left compartment mildly firm however still compressible. Patient reports tight feeling but denies pain with applied pressure. 2+ DP and PT pulses. Negative José Antonio's. No erythema, warmth.  Sensation intact.  She is ambulatory with steady gait, no limp     Neurological: She is alert and oriented to person, place, and time. GCS score is 15. GCS  eye subscore is 4. GCS verbal subscore is 5. GCS motor subscore is 6.   Skin: Skin is warm and dry.   Psychiatric: She has a normal mood and affect. Her behavior is normal. Judgment and thought content normal.         ED Course   Procedures  Labs Reviewed   CBC W/ AUTO DIFFERENTIAL - Abnormal       Result Value    WBC 7.35      RBC 4.58      Hemoglobin 12.7      Hematocrit 39.0      MCV 85      MCH 27.7      MCHC 32.6      RDW 14.6 (*)     Platelets 349      MPV 9.1 (*)     Immature Granulocytes 0.3      Gran # (ANC) 4.6      Immature Grans (Abs) 0.02      Lymph # 2.1      Mono # 0.5      Eos # 0.2      Baso # 0.03      nRBC 0      Gran % 62.5      Lymph % 28.3      Mono % 6.1      Eosinophil % 2.4      Basophil % 0.4      Differential Method Automated     COMPREHENSIVE METABOLIC PANEL - Abnormal    Sodium 139      Potassium 3.7      Chloride 105      CO2 22 (*)     Glucose 109      BUN 11      Creatinine 0.8      Calcium 9.3      Total Protein 7.5      Albumin 3.3 (*)     Total Bilirubin 0.1      Alkaline Phosphatase 62      AST 21      ALT 24      eGFR >60      Anion Gap 12     URINALYSIS, REFLEX TO URINE CULTURE    Specimen UA Urine, Clean Catch      Color, UA Yellow      Appearance, UA Clear      pH, UA 6.0      Specific Gravity, UA 1.030      Protein, UA Negative      Glucose, UA Negative      Ketones, UA Negative      Bilirubin (UA) Negative      Occult Blood UA Negative      Nitrite, UA Negative      Urobilinogen, UA Negative      Leukocytes, UA Negative      Narrative:     Specimen Source->Urine   POCT URINE PREGNANCY    POC Preg Test, Ur Negative       Acceptable Yes            Imaging Results              US Lower Extremity Veins Left (Final result)  Result time 11/05/24 18:36:23      Final result by Favio Clemente MD (11/05/24 18:36:23)                   Impression:      No evidence of deep venous thrombosis in the left lower extremity.      Electronically signed by: Favio Clemente  MD  Date:    11/05/2024  Time:    18:36               Narrative:    EXAMINATION:  US LOWER EXTREMITY VEINS LEFT    CLINICAL HISTORY:  leg swelling;    TECHNIQUE:  Duplex and color flow Doppler evaluation and graded compression of the left lower extremity veins was performed.    COMPARISON:  11/07/2022    FINDINGS:  Duplex and color flow Doppler evaluation does not reveal any evidence of acute venous thrombosis in the common femoral, superficial femoral, greater saphenous, popliteal, peroneal, anterior tibial and posterior tibial veins of the left lower extremity.  There is no reflux to suggest valvular incompetence.                                       Medications - No data to display  Medical Decision Making  28-year-old female presents with 3 days of left lower extremity swelling.  She reports a tight sensation, but denies any pain.  She is mildly hypertensive otherwise normal vitals.  Presentation not consistent with septic joint, compartment syndrome, gout, cellulitis, abscess. I will obtain labs to check electrolytes, renal hepatic function, albumin, BNP, UA.     Venous stasis, DVT, renal insufficiency, renal failure, DAMION, nephrotic syndrome, CHF exacerbation, cellulitis    DVT study is negative.  UPT is negative.  Normal electrolytes, renal and hepatic function. Doubt CHF. No proteinuria. Patient does have decreased albumin which may be contributing. Advised increased protein and balanced diet. Compression stockings, exercise and elevation. No additional emergent testing indicated. Recommend close PCP follow up. ED return precautions discussed for paresthesias, increased swelling and pain, color change. Discussed patient with my attending Dr. Champ Murrell who agrees    Problems Addressed:  Hypertension, unspecified type: acute illness or injury  Hypoalbuminemia: acute illness or injury  Leg swelling: acute illness or injury  Localized swelling of left lower leg: acute illness or injury    Amount and/or  Complexity of Data Reviewed  External Data Reviewed: notes.     Details: 03/30/2024 ER visit for gastritis  05/16/2024 family medicine visit  05/02/2024 leiomyoma OBGYN  Labs:  Decision-making details documented in ED Course.  Radiology:  Decision-making details documented in ED Course.              Attending Attestation:     Physician Attestation Statement for NP/PA:   I personally made/approved the management plan and take responsibility for the patient management.    Other NP/PA Attestation Additions:      Medical Decision Making: I discussed the patient's care with Advanced Practice Provider, I reviewed their note and agree with the history, physical, assessment, diagnosis, treatment, and discharge plan.  I did not perform a face-to-face evaluation of this patient, however was immediately available for consultation in the ED             ED Course as of 11/08/24 0608   Tue Nov 05, 2024   1718 hCG Qualitative, Urine: Negative [CS]   1846 US Lower Extremity Veins Left  No evidence of deep venous thrombosis in the left lower extremity. [CS]   1913 Discharge instructions discussed with patient.  [CS]      ED Course User Index  [CS] Modesta Morales PA-C                           Clinical Impression:  Final diagnoses:  [M79.89] Leg swelling (Primary)  [R22.42] Localized swelling of left lower leg  [E88.09] Hypoalbuminemia  [I10] Hypertension, unspecified type          ED Disposition Condition    Discharge Stable          ED Prescriptions    None       Follow-up Information       Follow up With Specialties Details Why Contact Info    Cabrera Campbell MD Cardiology, Interventional Cardiology Schedule an appointment as soon as possible for a visit   [address]  [address]  [facility] [address]  [phone]               Modesta Morales PA-C  11/07/24 1022       Champ Murrell MD  11/08/24 0608

## 2024-11-06 NOTE — DISCHARGE INSTRUCTIONS
Be sure to eat a nutritious diet    High-Protein Foods:   Cheese, cottage cheese  Milk, soy milk, milk powder  Eggs  Greek yogurt and other yogurts  Nuts, seeds, nut butter  Tofu and other soy products  Beans, peas, lentils  Quinoa  Beef, chicken, turkey, pork, and other meats  Fish and other seafood  Nutrition supplement drinks    Wear compression stockings to help decrease the swelling.

## 2024-11-19 ENCOUNTER — PATIENT MESSAGE (OUTPATIENT)
Dept: RESEARCH | Facility: HOSPITAL | Age: 29
End: 2024-11-19
Payer: COMMERCIAL

## 2024-11-21 ENCOUNTER — OFFICE VISIT (OUTPATIENT)
Dept: CARDIOLOGY | Facility: CLINIC | Age: 29
End: 2024-11-21
Payer: COMMERCIAL

## 2024-11-21 VITALS
HEIGHT: 63 IN | OXYGEN SATURATION: 98 % | SYSTOLIC BLOOD PRESSURE: 127 MMHG | BODY MASS INDEX: 51.91 KG/M2 | HEART RATE: 98 BPM | DIASTOLIC BLOOD PRESSURE: 82 MMHG | WEIGHT: 293 LBS

## 2024-11-21 DIAGNOSIS — R22.42 LOCALIZED SWELLING OF LEFT LOWER LEG: ICD-10-CM

## 2024-11-21 DIAGNOSIS — M79.89 LEG SWELLING: ICD-10-CM

## 2024-11-21 DIAGNOSIS — M79.89 LEG SWELLING: Primary | ICD-10-CM

## 2024-11-21 DIAGNOSIS — E66.01 MORBID OBESITY: Primary | ICD-10-CM

## 2024-11-21 PROCEDURE — 1159F MED LIST DOCD IN RCRD: CPT | Mod: CPTII,S$GLB,, | Performed by: INTERNAL MEDICINE

## 2024-11-21 PROCEDURE — 99999 PR PBB SHADOW E&M-EST. PATIENT-LVL IV: CPT | Mod: PBBFAC,,, | Performed by: INTERNAL MEDICINE

## 2024-11-21 PROCEDURE — 3074F SYST BP LT 130 MM HG: CPT | Mod: CPTII,S$GLB,, | Performed by: INTERNAL MEDICINE

## 2024-11-21 PROCEDURE — 4010F ACE/ARB THERAPY RXD/TAKEN: CPT | Mod: CPTII,S$GLB,, | Performed by: INTERNAL MEDICINE

## 2024-11-21 PROCEDURE — 99204 OFFICE O/P NEW MOD 45 MIN: CPT | Mod: S$GLB,,, | Performed by: INTERNAL MEDICINE

## 2024-11-21 PROCEDURE — 3079F DIAST BP 80-89 MM HG: CPT | Mod: CPTII,S$GLB,, | Performed by: INTERNAL MEDICINE

## 2024-11-21 PROCEDURE — 3008F BODY MASS INDEX DOCD: CPT | Mod: CPTII,S$GLB,, | Performed by: INTERNAL MEDICINE

## 2024-11-21 NOTE — PROGRESS NOTES
Subjective:   @Patient ID:  Peyton Qiu is a 29 y.o. female who presents for evaluation of lower extremities edema      HPI:   2024:  Here for initial evaluation of lower extremities edema.  She reports intermittent lower extremities edema bilaterally worse on the left side.  No significant shortness of breath, orthopnea or PND.  ER visit ultrasound was negative for DVT.  On my evaluation today has trace bilateral lower extremities edema.  Medical history significant for obesity, polycystic ovarian syndrome        PMH:   Past Medical History:   Diagnosis Date    Class 3 severe obesity with body mass index (BMI) of 50.0 to 59.9 in adult 2022    Eczema     Insulin resistance 2024    PCOS (polycystic ovarian syndrome) 2020    Seasonal allergic rhinitis     Vertigo         SH:  No tobacco abuse    Prior cardiovascular  Hx  --------------------------------             The ASCVD Risk score (Renetta CABRERA, et al., 2019) failed to calculate for the following reasons:    The 2019 ASCVD risk score is only valid for ages 40 to 79      Patient Active Problem List    Diagnosis Date Noted    Iron deficiency anemia secondary to blood loss (chronic) 2023    Chronic female pelvic pain 10/28/2022    Hip pain 10/28/2022    Iron deficiency anemia during pregnancy 2022    SI (sacroiliac) pain 2022    Weakness 2022    Myalgia 2022    Uterine leiomyoma 2022           Right Arm BP - Sittin/85  Left Arm BP - Sittin/82        LAST HbA1c  Lab Results   Component Value Date    HGBA1C 5.4 2022       Lipid panel  Lab Results   Component Value Date    CHOL 192 2020    CHOL 191 2011     Lab Results   Component Value Date    HDL 38 (L) 2020    HDL 48 2011     Lab Results   Component Value Date    LDLCALC 132.4 2020    LDLCALC 128.8 2011     Lab Results   Component Value Date    TRIG 108 2020    TRIG 71 2011      Lab Results   Component Value Date    CHOLHDL 19.8 (L) 08/13/2020    CHOLHDL 25.1 07/13/2011            Review of Systems   Constitutional: Negative for chills and fever.   HENT:  Negative for hearing loss and nosebleeds.    Eyes:  Negative for blurred vision.   Cardiovascular:         As in HPI   Respiratory:  Negative for hemoptysis and shortness of breath.    Hematologic/Lymphatic: Negative for bleeding problem.   Skin:  Negative for itching.   Musculoskeletal:  Negative for falls.   Gastrointestinal:  Negative for abdominal pain and hematochezia.   Genitourinary:  Negative for hematuria.   Neurological:  Negative for dizziness and loss of balance.   Psychiatric/Behavioral:  Negative for altered mental status and depression.        Objective:   Physical Exam  Constitutional:       Appearance: She is well-developed. She is obese.   HENT:      Head: Normocephalic and atraumatic.   Eyes:      Conjunctiva/sclera: Conjunctivae normal.   Neck:      Vascular: No carotid bruit or JVD.   Cardiovascular:      Rate and Rhythm: Normal rate and regular rhythm.      Pulses:           Carotid pulses are 2+ on the right side and 2+ on the left side.       Radial pulses are 2+ on the right side and 2+ on the left side.        Dorsalis pedis pulses are 1+ on the right side and 1+ on the left side.      Heart sounds: Normal heart sounds. No murmur heard.     No friction rub. No gallop.   Pulmonary:      Effort: Pulmonary effort is normal. No respiratory distress.      Breath sounds: Normal breath sounds. No stridor. No wheezing.   Musculoskeletal:      Cervical back: Neck supple.   Skin:     General: Skin is warm and dry.   Neurological:      Mental Status: She is alert and oriented to person, place, and time.   Psychiatric:         Behavior: Behavior normal.         Assessment:     1. Morbid obesity    2. Leg swelling    3. Localized swelling of left lower leg        Plan:   Lower extremities edema is multifactorial, just  trace by physical examination  We will check echocardiogram for EF and diastolic function  Venous ultrasound insufficiency protocol  Compression stockings and leg elevation    Weight loss is very imperative  Referral to bariatric medicine team placed        Pertinent cardiac images and EKG reviewed independently.    Continue with current medical plan and lifestyle changes.  Return sooner for concerns or questions. If symptoms persist go to the ED  I have reviewed all pertinent data including patient's medical history in detail and updated the computerized patient record.     Orders Placed This Encounter   Procedures    COMPRESSION STOCKINGS     Order Specific Question:   Pressure amount:     Answer:   15-20 mmHg    Ambulatory referral/consult to Bariatric/Obesity Medicine     Standing Status:   Future     Standing Expiration Date:   12/21/2025     Referral Priority:   Routine     Referral Type:   Consultation     Referral Reason:   Specialty Services Required     Requested Specialty:   Bariatrics     Number of Visits Requested:   1       Follow up as scheduled.     She expressed verbal understanding and agreed with the plan    Patient's Medications   New Prescriptions    No medications on file   Previous Medications    FAMOTIDINE (PEPCID) 40 MG TABLET    Take 1 tablet (40 mg total) by mouth daily as needed for Heartburn.    FERROUS SULFATE 325 (65 FE) MG EC TABLET    Take 1 tablet (325 mg total) by mouth every other day.    IBUPROFEN (ADVIL,MOTRIN) 600 MG TABLET    Take 1 tablet (600 mg total) by mouth every 6 (six) hours as needed (cramping).    KETOCONAZOLE (NIZORAL) 2 % CREAM    Apply topically 2 (two) times daily.    MELOXICAM (MOBIC) 15 MG TABLET    Take 1 tablet (15 mg total) by mouth once daily.    METFORMIN (GLUMETZA) 1000 MG (MOD) 24HR TABLET    Take 1 tablet (1,000 mg total) by mouth 2 (two) times daily with meals.    NORETHINDRONE (ORTHO MICRONOR) 0.35 MG TABLET    Take 1 tablet (0.35 mg total) by mouth  once daily.    NORETHINDRONE-ETHINYL ESTRADIOL (MICROGESTIN 1/20) 1-20 MG-MCG PER TABLET    Take 1 tablet by mouth once daily.    NORGESTIMATE-ETHINYL ESTRADIOL (ORTHO-CYCLEN) 0.25-35 MG-MCG PER TABLET    Take 1 tablet by mouth once daily.    OZEMPIC 0.25 MG OR 0.5 MG (2 MG/3 ML) PEN INJECTOR    Inject into the skin.    PRENATAL VIT NO.124/IRON/FOLIC (PRENATAL VITAMIN ORAL)    Take by mouth.    TRIAMCINOLONE ACETONIDE 0.1% (KENALOG) 0.1 % OINTMENT    Apply topically 2 (two) times daily.   Modified Medications    No medications on file   Discontinued Medications    No medications on file        Cabrera Campbell MD, FACC, RPVI  Heart and Vascular Interventional Cardiology

## 2025-02-18 ENCOUNTER — PATIENT MESSAGE (OUTPATIENT)
Dept: OBSTETRICS AND GYNECOLOGY | Facility: CLINIC | Age: 30
End: 2025-02-18
Payer: MEDICAID

## 2025-02-18 ENCOUNTER — HOSPITAL ENCOUNTER (EMERGENCY)
Facility: HOSPITAL | Age: 30
Discharge: HOME | End: 2025-02-18
Attending: EMERGENCY MEDICINE
Payer: MEDICAID

## 2025-02-18 VITALS
BODY MASS INDEX: 51.91 KG/M2 | TEMPERATURE: 98 F | OXYGEN SATURATION: 100 % | DIASTOLIC BLOOD PRESSURE: 69 MMHG | SYSTOLIC BLOOD PRESSURE: 132 MMHG | WEIGHT: 293 LBS | HEIGHT: 63 IN | HEART RATE: 90 BPM | RESPIRATION RATE: 18 BRPM

## 2025-02-18 DIAGNOSIS — N93.8 DYSFUNCTIONAL UTERINE BLEEDING: Primary | ICD-10-CM

## 2025-02-18 LAB
ANION GAP SERPL CALC-SCNC: 10 MMOL/L (ref 8–16)
B-HCG UR QL: NEGATIVE
BASOPHILS # BLD AUTO: 0.03 K/UL (ref 0–0.2)
BASOPHILS NFR BLD: 0.4 % (ref 0–1.9)
BUN SERPL-MCNC: 12 MG/DL (ref 6–20)
CALCIUM SERPL-MCNC: 8.7 MG/DL (ref 8.7–10.5)
CHLORIDE SERPL-SCNC: 107 MMOL/L (ref 95–110)
CO2 SERPL-SCNC: 20 MMOL/L (ref 23–29)
CREAT SERPL-MCNC: 0.7 MG/DL (ref 0.5–1.4)
CTP QC/QA: YES
DIFFERENTIAL METHOD BLD: ABNORMAL
EOSINOPHIL # BLD AUTO: 0.2 K/UL (ref 0–0.5)
EOSINOPHIL NFR BLD: 2.4 % (ref 0–8)
ERYTHROCYTE [DISTWIDTH] IN BLOOD BY AUTOMATED COUNT: 14.4 % (ref 11.5–14.5)
EST. GFR  (NO RACE VARIABLE): >60 ML/MIN/1.73 M^2
GLUCOSE SERPL-MCNC: 120 MG/DL (ref 70–110)
HCT VFR BLD AUTO: 40.5 % (ref 37–48.5)
HGB BLD-MCNC: 12.8 G/DL (ref 12–16)
IMM GRANULOCYTES # BLD AUTO: 0.03 K/UL (ref 0–0.04)
IMM GRANULOCYTES NFR BLD AUTO: 0.4 % (ref 0–0.5)
LYMPHOCYTES # BLD AUTO: 1.8 K/UL (ref 1–4.8)
LYMPHOCYTES NFR BLD: 27.1 % (ref 18–48)
MCH RBC QN AUTO: 27.2 PG (ref 27–31)
MCHC RBC AUTO-ENTMCNC: 31.6 G/DL (ref 32–36)
MCV RBC AUTO: 86 FL (ref 82–98)
MONOCYTES # BLD AUTO: 0.3 K/UL (ref 0.3–1)
MONOCYTES NFR BLD: 4.7 % (ref 4–15)
NEUTROPHILS # BLD AUTO: 4.4 K/UL (ref 1.8–7.7)
NEUTROPHILS NFR BLD: 65 % (ref 38–73)
NRBC BLD-RTO: 0 /100 WBC
PLATELET # BLD AUTO: 285 K/UL (ref 150–450)
PMV BLD AUTO: 9.4 FL (ref 9.2–12.9)
POTASSIUM SERPL-SCNC: 4 MMOL/L (ref 3.5–5.1)
RBC # BLD AUTO: 4.7 M/UL (ref 4–5.4)
SODIUM SERPL-SCNC: 137 MMOL/L (ref 136–145)
T4 FREE SERPL-MCNC: 0.81 NG/DL (ref 0.71–1.51)
TSH SERPL DL<=0.005 MIU/L-ACNC: 0.23 UIU/ML (ref 0.4–4)
WBC # BLD AUTO: 6.79 K/UL (ref 3.9–12.7)

## 2025-02-18 PROCEDURE — 80048 BASIC METABOLIC PNL TOTAL CA: CPT | Performed by: PHYSICIAN ASSISTANT

## 2025-02-18 PROCEDURE — 99284 EMERGENCY DEPT VISIT MOD MDM: CPT

## 2025-02-18 PROCEDURE — 84439 ASSAY OF FREE THYROXINE: CPT | Performed by: PHYSICIAN ASSISTANT

## 2025-02-18 PROCEDURE — 85025 COMPLETE CBC W/AUTO DIFF WBC: CPT | Performed by: NURSE PRACTITIONER

## 2025-02-18 PROCEDURE — 81025 URINE PREGNANCY TEST: CPT | Performed by: NURSE PRACTITIONER

## 2025-02-18 PROCEDURE — 84443 ASSAY THYROID STIM HORMONE: CPT | Performed by: PHYSICIAN ASSISTANT

## 2025-02-18 NOTE — ED PROVIDER NOTES
Encounter Date: 2/18/2025       History     Chief Complaint   Patient presents with    Vaginal Bleeding     Pt reports vaginal bleeding since 1/13/25 and worsening since 2/15. Pt denies pain, weakness, or other sx.      29-year-old female presents to ED with concern of vaginal bleeding.  She reports significant history of heavy vaginal bleeding.  Known history of uterine fibroids.  She reports she has had light spotting for nearly 1 month but bleeding significantly worsened over past 3-4 days.  She states she is using both super tampons and pads, stating her super tampon is completely saturated in roughly 1 hour.  Denying any abdominal pain.  No lightheadedness or dizziness.  No STD concerns.  No urinary or bowel complaints.  She does report recently discontinuing her oral contraceptive.  No other acute complaints at this time    The history is provided by the patient.     Review of patient's allergies indicates:   Allergen Reactions    Augmentin [amoxicillin-pot clavulanate] Rash    Betamethasone, augmented Diarrhea, Hives, Nausea And Vomiting and Rash     Past Medical History:   Diagnosis Date    Class 3 severe obesity with body mass index (BMI) of 50.0 to 59.9 in adult 01/20/2022    Eczema     Insulin resistance 01/20/2024    PCOS (polycystic ovarian syndrome) 11/12/2020    Seasonal allergic rhinitis     Vertigo      Past Surgical History:   Procedure Laterality Date    removal of cyst in head  12/20/2023    WISDOM TOOTH EXTRACTION       Family History   Problem Relation Name Age of Onset    Breast cancer Paternal Grandmother      Diabetes Maternal Grandmother      Heart disease Maternal Grandmother      Coronary artery disease Father      No Known Problems Mother      Colon cancer Neg Hx      Ovarian cancer Neg Hx       Social History[1]  Review of Systems   Respiratory:  Negative for shortness of breath.    Gastrointestinal:  Negative for abdominal pain, diarrhea, nausea and vomiting.   Genitourinary:  Positive  for vaginal bleeding. Negative for dysuria, flank pain and vaginal discharge.   Neurological:  Negative for dizziness, light-headedness and headaches.       Physical Exam     Initial Vitals [02/18/25 1028]   BP Pulse Resp Temp SpO2   (!) 148/83 99 20 97.8 °F (36.6 °C) 99 %      MAP       --         Physical Exam    Vitals reviewed.  Constitutional: Vital signs are normal. She appears well-developed and well-nourished. She is Obese . She is cooperative. She does not have a sickly appearance. She does not appear ill. No distress.   Resting comfortably, no apparent distress   HENT:   Head: Normocephalic and atraumatic.   Eyes: EOM are normal.   Neck:   Normal range of motion.  Cardiovascular:  Normal rate.           Pulmonary/Chest: Effort normal.   Genitourinary:    Genitourinary Comments:  exam performed with nurse, Indy, at bedside.  No external abnormalities.  Small amount of bright red blood noted within vaginal vault but no hemorrhage appreciated on exam.  No CMT.     Musculoskeletal:      Cervical back: Normal range of motion.     Neurological: She is alert and oriented to person, place, and time. GCS eye subscore is 4. GCS verbal subscore is 5. GCS motor subscore is 6.   Psychiatric: She has a normal mood and affect. Her speech is normal and behavior is normal.         ED Course   Procedures  Labs Reviewed   CBC W/ AUTO DIFFERENTIAL - Abnormal       Result Value    WBC 6.79      RBC 4.70      Hemoglobin 12.8      Hematocrit 40.5      MCV 86      MCH 27.2      MCHC 31.6 (*)     RDW 14.4      Platelets 285      MPV 9.4      Immature Granulocytes 0.4      Gran # (ANC) 4.4      Immature Grans (Abs) 0.03      Lymph # 1.8      Mono # 0.3      Eos # 0.2      Baso # 0.03      nRBC 0      Gran % 65.0      Lymph % 27.1      Mono % 4.7      Eosinophil % 2.4      Basophil % 0.4      Differential Method Automated     TSH - Abnormal    TSH 0.231 (*)    BASIC METABOLIC PANEL - Abnormal    Sodium 137      Potassium 4.0       Chloride 107      CO2 20 (*)     Glucose 120 (*)     BUN 12      Creatinine 0.7      Calcium 8.7      Anion Gap 10      eGFR >60     T4, FREE    Free T4 0.81     POCT URINE PREGNANCY    POC Preg Test, Ur Negative       Acceptable Yes            Imaging Results    None          Medications - No data to display  Medical Decision Making  Patient presents with concern of heavy vaginal bleeding.  Afebrile with vitals WNL.  Patient in no distress on exam    DDx:  Including but not limited to dysfunctional uterine bleeding, anemia, fibroids    Amount and/or Complexity of Data Reviewed  Labs: ordered. Decision-making details documented in ED Course.               ED Course as of 02/18/25 1317   Tue Feb 18, 2025   1104 POCT urine pregnancy  Negative [KS]   1104 BP(!): 148/83 [KS]   1104 Temp: 97.8 °F (36.6 °C) [KS]   1104 Pulse: 99 [KS]   1104 Resp: 20 [KS]   1104 SpO2: 99 % [KS]   1104 Vitals reassuring [KS]   1151 CBC auto differential(!)  Unremarkable.  Stable H/H [KS]   1222 Basic metabolic panel(!)  Unremarkable [KS]   1310 T4, Free  WNL [KS]   1311 Patient continues to rest comfortably.  H/H stable.  Patient has known history of heavy vaginal bleeding.  Known fibroids.  Denying any abdominal pain.  No severe bleeding noted on today's exam.  Stable for discharge but she will need close Ob/gyn follow-up.  ED return precautions were discussed at length.  Patient states understanding and agrees with plan [KS]   1311 Patient discussed with attending, Dr. Malone, who agrees with ED course and dispo. [KS]      ED Course User Index  [KS] Franklin Klein PA-C                           Clinical Impression:  Final diagnoses:  [N93.8] Dysfunctional uterine bleeding (Primary)          ED Disposition Condition    Discharge Stable          ED Prescriptions    None       Follow-up Information       Follow up With Specialties Details Why Contact Info    Monica Baumann MD ([facility]) Family Medicine   [address]  [facility] [place] [address]  [phone]      Your Ob/gyn                   [1]   Social History  Tobacco Use    Smoking status: Never    Smokeless tobacco: Never   Substance Use Topics    Alcohol use: No    Drug use: Never        Franklin Klein PA-C  02/18/25 6509

## 2025-02-18 NOTE — FIRST PROVIDER EVALUATION
Emergency Department TeleTriage Encounter Note      CHIEF COMPLAINT    Chief Complaint   Patient presents with    Vaginal Bleeding     Pt reports vaginal bleeding since 1/13/25 and worsening since 2/15. Pt denies pain, weakness, or other sx.        VITAL SIGNS   Initial Vitals [02/18/25 1028]   BP Pulse Resp Temp SpO2   (!) 148/83 99 20 97.8 °F (36.6 °C) 99 %      MAP       --            ALLERGIES    Review of patient's allergies indicates:   Allergen Reactions    Augmentin [amoxicillin-pot clavulanate] Rash    Betamethasone, augmented Diarrhea, Hives, Nausea And Vomiting and Rash       PROVIDER TRIAGE NOTE  This is a teletriage evaluation of a 29 y.o. female presenting to the ED complaining of vaginal bleeding for >1 month, worse since 2/15. Denies abd pain, SOB, and light-headedness. Reports fatigue.    Alert, sitting upright.     Initial orders will be placed and care will be transferred to an alternate provider when patient is roomed for a full evaluation. Any additional orders and the final disposition will be determined by that provider.         ORDERS  Labs Reviewed - No data to display    ED Orders (720h ago, onward)      Start Ordered     Status Ordering Provider    02/18/25 1037 02/18/25 1036  POCT urine pregnancy  Once         Ordered FANNY ROBERTSON N.    02/18/25 1037 02/18/25 1036  CBC auto differential  STAT         Ordered FANNY ROBERTSON N.    02/18/25 1037 02/18/25 1036  Insert Saline lock IV  Once         Ordered FANNY ROBERTSON              Virtual Visit Note: The provider triage portion of this emergency department evaluation and documentation was performed via bigtincan, a HIPAA-compliant telemedicine application, in concert with a tele-presenter in the room. A face to face patient evaluation with one of my colleagues will occur once the patient is placed in an emergency department room.      DISCLAIMER: This note was prepared with M*Modal voice recognition  transcription software. Garbled syntax, mangled pronouns, and other bizarre constructions may be attributed to that software system.

## 2025-02-18 NOTE — ED NOTES
Patient states I have a hx of PCOS.  States haven't had a cycle in over a year and started spotting 1/13.  Patient states the bleeding has gotten progressively worst and has been passing clots.  Patient denies pain and was told to come here from her OB.

## 2025-02-18 NOTE — ED TRIAGE NOTES
Pt reports vag bleeding on Jan 13th. She states the bleeding started out light but since the weekend it has become heavy. She is changing pads every 30 mins. She admits to passing clots. She is reporting fatigue. Told by gyn to come to ED

## 2025-02-18 NOTE — DISCHARGE INSTRUCTIONS

## 2025-02-19 RX ORDER — DROSPIRENONE AND ETHINYL ESTRADIOL 0.03MG-3MG
1 KIT ORAL DAILY
Qty: 84 TABLET | Refills: 3 | Status: SHIPPED | OUTPATIENT
Start: 2025-02-19 | End: 2026-02-19

## 2025-03-02 ENCOUNTER — OFFICE VISIT (OUTPATIENT)
Dept: URGENT CARE | Facility: CLINIC | Age: 30
End: 2025-03-02

## 2025-03-02 ENCOUNTER — PATIENT MESSAGE (OUTPATIENT)
Dept: URGENT CARE | Facility: CLINIC | Age: 30
End: 2025-03-02
Payer: MEDICAID

## 2025-03-02 VITALS
HEIGHT: 63 IN | RESPIRATION RATE: 18 BRPM | HEART RATE: 84 BPM | SYSTOLIC BLOOD PRESSURE: 124 MMHG | DIASTOLIC BLOOD PRESSURE: 83 MMHG | WEIGHT: 293 LBS | TEMPERATURE: 98 F | OXYGEN SATURATION: 99 % | BODY MASS INDEX: 51.91 KG/M2

## 2025-03-02 DIAGNOSIS — Z02.6 ENCOUNTER RELATED TO WORKER'S COMPENSATION CLAIM: Primary | ICD-10-CM

## 2025-03-02 DIAGNOSIS — T14.90XA TRAUMA: ICD-10-CM

## 2025-03-02 DIAGNOSIS — M54.2 NECK PAIN: ICD-10-CM

## 2025-03-02 LAB
CTP QC/QA: YES
POC 5 PANEL DRUG SCREEN: NEGATIVE

## 2025-03-02 RX ORDER — IBUPROFEN 800 MG/1
800 TABLET ORAL EVERY 8 HOURS PRN
Qty: 21 TABLET | Refills: 0 | Status: SHIPPED | OUTPATIENT
Start: 2025-03-02

## 2025-03-02 RX ORDER — CYCLOBENZAPRINE HCL 10 MG
10 TABLET ORAL 3 TIMES DAILY PRN
Qty: 30 TABLET | Refills: 0 | Status: SHIPPED | OUTPATIENT
Start: 2025-03-02 | End: 2025-03-12

## 2025-03-02 NOTE — LETTER
[facility] - [place] [address]  Phone: [phone]  Fax: [phone]  [facility]: [id number]    Pt Name: Peyton Qiu  Injury Date:     Employee ID:  Date of First Treatment: 03/03/2025   Company: [employer]      Appointment Time: 09:55 AM Arrived: ***   Provider: Tammi Suggs MD Time Out:***     Office Treatment:   1. Encounter related to worker's compensation claim    2. Trauma    3. Neck pain      Medications Ordered This Encounter   Medications    cyclobenzaprine (FLEXERIL) 10 MG tablet    ibuprofen (ADVIL,MOTRIN) 800 MG tablet                 Return Appointment: 3/5/2025 at ***

## 2025-03-02 NOTE — PROGRESS NOTES
Subjective:      Patient ID: Peyton Qiu is a 29 y.o. female.    Chief Complaint: Neck Pain    Patient's place of employment - [employer]  Patient's job title -    Date of injury -  03/01/2025  Body part injured including left or right - LT side of neck, top of head  Injury Mechanism -  door frame of van   What they were doing when they got hurt - pt states she was moving fast and hit the door frame of van.   What they did immediately after -  notify supervisor   Pain scale right now - 03/10      Neck Pain   This is a new problem. The current episode started yesterday. The problem occurs constantly. The problem has been unchanged. The pain is present in the left side. The quality of the pain is described as aching. The pain is at a severity of 3/10. The pain is mild. She has tried nothing for the symptoms. The treatment provided no relief.       Neck: Positive for neck pain.   Skin:  Negative for erythema.     Objective:     Physical Exam  Vitals and nursing note reviewed.   Constitutional:       General: She is not in acute distress.     Appearance: Normal appearance.   HENT:      Head: Normocephalic and atraumatic.        Comments: Tenderness of scalp area outlined above no contusion or swelling seen     Right Ear: Tympanic membrane, ear canal and external ear normal.      Left Ear: Tympanic membrane, ear canal and external ear normal.      Nose: No congestion.   Eyes:      Extraocular Movements: Extraocular movements intact.      Conjunctiva/sclera: Conjunctivae normal.      Pupils: Pupils are equal, round, and reactive to light.   Pulmonary:      Effort: Pulmonary effort is normal. No respiratory distress.      Breath sounds: No stridor.   Musculoskeletal:         General: Tenderness present.      Cervical back: Neck supple. Spasms and tenderness present. Decreased range of motion.      Thoracic back: Normal.      Lumbar back: Normal.   Skin:     General: Skin is warm and dry.       Capillary Refill: Capillary refill takes less than 2 seconds.      Findings: No bruising or erythema.   Neurological:      General: No focal deficit present.      Mental Status: She is alert and oriented to person, place, and time. Mental status is at baseline.   Psychiatric:         Mood and Affect: Mood normal.         Behavior: Behavior normal.         Thought Content: Thought content normal.         Judgment: Judgment normal.      Assessment:     Plan:   1. Encounter related to worker's compensation claim  - X-Ray Skull Complete Min 4 Views; Future  - X-Ray Cervical Spine AP And Lateral; Future  - ibuprofen (ADVIL,MOTRIN) 800 MG tablet; Take 1 tablet (800 mg total) by mouth every 8 (eight) hours as needed for Pain.  Dispense: 21 tablet; Refill: 0  - POCT Rapid Drug Screen 5 Panel    2. Trauma  - X-Ray Skull Complete Min 4 Views; Future  - X-Ray Cervical Spine AP And Lateral; Future  - ibuprofen (ADVIL,MOTRIN) 800 MG tablet; Take 1 tablet (800 mg total) by mouth every 8 (eight) hours as needed for Pain.  Dispense: 21 tablet; Refill: 0  - Ambulatory referral/consult to Occupational Medicine    3. Neck pain  - cyclobenzaprine (FLEXERIL) 10 MG tablet; Take 1 tablet (10 mg total) by mouth 3 (three) times daily as needed.  Dispense: 30 tablet; Refill: 0  - Ambulatory referral/consult to Occupational Medicine   Will call pt with results when available

## 2025-03-02 NOTE — LETTER
[facility] - [place] [address]  Phone: [phone]  Fax: [phone]  [facility]: [id number]    Pt Name: Peyton Qiu  Injury Date:  03/02/2025   Employee ID: [id number] Date of First Treatment: 03/02/2025   Company: [employer]      Appointment Time: 09:55 AM Arrived: 10:00 am   Provider: Tammi Suggs MD Time Out:12:03 pm     Office Treatment:   1. Encounter related to worker's compensation claim    2. Trauma    3. Neck pain      Medications Ordered This Encounter   Medications    cyclobenzaprine (FLEXERIL) 10 MG tablet    ibuprofen (ADVIL,MOTRIN) 800 MG tablet             Follow up with Occupational Health.     Return Appointment: 3/5/2025 at 10:00 am    KW

## 2025-03-05 ENCOUNTER — OFFICE VISIT (OUTPATIENT)
Dept: URGENT CARE | Facility: CLINIC | Age: 30
End: 2025-03-05
Payer: OTHER MISCELLANEOUS

## 2025-03-05 VITALS
WEIGHT: 293 LBS | BODY MASS INDEX: 51.91 KG/M2 | DIASTOLIC BLOOD PRESSURE: 86 MMHG | HEART RATE: 83 BPM | HEIGHT: 63 IN | SYSTOLIC BLOOD PRESSURE: 135 MMHG | RESPIRATION RATE: 17 BRPM | OXYGEN SATURATION: 99 %

## 2025-03-05 DIAGNOSIS — S00.93XA CONTUSION OF HEAD, UNSPECIFIED PART OF HEAD, INITIAL ENCOUNTER: ICD-10-CM

## 2025-03-05 DIAGNOSIS — S16.1XXA STRAIN OF NECK MUSCLE, INITIAL ENCOUNTER: Primary | ICD-10-CM

## 2025-03-05 DIAGNOSIS — Z02.6 ENCOUNTER RELATED TO WORKER'S COMPENSATION CLAIM: ICD-10-CM

## 2025-03-05 NOTE — LETTER
[facility] - [place] [address]  Phone: [phone]  Fax: [phone]  [facility]: [id number]    Pt Name: Peyton Qiu  Injury Date: 03/01/2025    Employee ID: [id number] Date of First Treatment: 03/05/2025   Company: [email or web address]      Appointment Time: 09:45 AM Arrived: 10:15 am   Provider: Karla Beyer NP Time Out:11:15 am     Office Treatment:   1. Strain of neck muscle, initial encounter    2. Contusion of head, unspecified part of head, initial encounter    3. Encounter related to worker's compensation claim          Patient Instructions: Attention not to aggravate affected area, Daily home exercises/warm soaks        Restrictions: No lifting/pushing/pulling more than 10 lbs, No above the shoulder/overhead work, No driving company vehicles     Return Appointment: 3/10/2025 at 9:00 am    MEGAN

## 2025-03-05 NOTE — PROGRESS NOTES
Subjective:      Patient ID: Peyton Qiu is a 29 y.o. female.    Chief Complaint: Head Injury    Patient's place of employment - [employer]  Patient's job title -   Date of injury -  03/01/2025  Body part injured including left or right - LT side of neck, top of head  Injury Mechanism -  direct blow to the door frame of van   What they were doing when they got hurt - Pt states she was moving fast and hit the door frame of van.   What they did immediately after - Notified supervisor; was seen in urgent care on 3/2/2025  Pain scale right now - 2/10    KW    Patient comes in today from an urgent care follow-up from injury sustained Saturday March 1, 2025.  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  On Saturday she was getting in her van to retrieve a package when she hit the top of her head on the roof of the van.  She initially had some dizziness and nausea and then vomited twice.  The following day she went to the urgent care.  X-rays of the skull and neck were done which were negative. I have reviewed the urgent care notes as well as the x-ray reports.  She was prescribed Flexeril and ibuprofen which she will  at the pharmacy today.  She no longer has had any dizziness or lightheadedness.  No more nausea or vomiting.  She continues to have mild pain to the top of her head as well as neck pain on the left side.  Intermittent tingling to 3rd, 4th and 5th fingers R hand. She has been applying heat to the neck but tries to avoid taking medications.MWT           Neck: Positive for neck pain and neck stiffness.   Gastrointestinal:  Positive for nausea and vomiting. Negative for abdominal pain.   Musculoskeletal:  Positive for pain.   Skin:  Negative for laceration, erythema and bruising.   Neurological:  Positive for headaches and tingling. Negative for dizziness, light-headedness, passing out, coordination disturbances, loss of balance, disorientation, altered mental status, loss  of consciousness and numbness.   Psychiatric/Behavioral:  Negative for altered mental status, disorientation and sleep disturbance.      Objective:     Physical Exam  Vitals and nursing note reviewed.   Constitutional:       General: She is not in acute distress.     Appearance: Normal appearance.   HENT:      Head: Normocephalic and atraumatic.      Right Ear: Tympanic membrane, ear canal and external ear normal.      Left Ear: Tympanic membrane, ear canal and external ear normal.      Nose: Nose normal.      Mouth/Throat:      Mouth: Mucous membranes are moist.      Pharynx: Oropharynx is clear.   Eyes:      Extraocular Movements: Extraocular movements intact.      Conjunctiva/sclera: Conjunctivae normal.      Pupils: Pupils are equal, round, and reactive to light.   Cardiovascular:      Rate and Rhythm: Normal rate and regular rhythm.      Pulses: Normal pulses.      Heart sounds: Normal heart sounds.   Pulmonary:      Effort: Pulmonary effort is normal.      Breath sounds: Normal breath sounds.   Musculoskeletal:         General: Tenderness present. No swelling or deformity.      Cervical back: Spasms and tenderness present. No swelling, deformity, erythema or lacerations. Decreased range of motion.        Back:       Comments: Pain to L side of neck with flexion, bilateral rotation. Limited L sided rotation. Spasm appreciated to L side of neck.  FROM to shoulders without pain.     Skin:     General: Skin is warm.      Findings: No bruising or erythema.   Neurological:      General: No focal deficit present.      Mental Status: She is alert and oriented to person, place, and time. Mental status is at baseline.      GCS: GCS eye subscore is 4. GCS verbal subscore is 5. GCS motor subscore is 6.      Cranial Nerves: No cranial nerve deficit.      Motor: Motor function is intact.      Coordination: Coordination is intact. Romberg sign negative. Coordination normal.      Gait: Gait and tandem walk normal.    Psychiatric:         Mood and Affect: Mood normal.         Behavior: Behavior normal.         Thought Content: Thought content normal.         Judgment: Judgment normal.        Assessment:      1. Strain of neck muscle, initial encounter    2. Contusion of head, unspecified part of head, initial encounter    3. Encounter related to worker's compensation claim      Plan:   X-Ray Skull Complete Min 4 Views  Result Date: 3/2/2025  EXAMINATION: XR SKULL COMPLETE MIN 4 VIEWS CLINICAL HISTORY: Encounter for examination for insurance purposes TECHNIQUE: Four views of the skull COMPARISON: None FINDINGS: There is no osseous abnormality.     See above Electronically signed by: Jose Angel Martinez Date:    03/02/2025 Time:    13:53    X-Ray Cervical Spine AP And Lateral  Result Date: 3/2/2025  EXAMINATION: XR CERVICAL SPINE AP LATERAL CLINICAL HISTORY: Encounter for examination for insurance purposes TECHNIQUE: AP, lateral and open mouth views of the cervical spine were performed. COMPARISON: None. FINDINGS: Vertebral body heights are normal.  There is mild degenerative disc disease at C6/C7 and C7/T1.  No prevertebral soft tissue swelling.     See above Electronically signed by: Jose Angel Martinez Date:    03/02/2025 Time:    13:52          I have reviewed the skull and cervical spine x-rays that were done in the . No acute findings. Reviewed with pt.    She will  Rxs today and take as needed. Discussed no driving or working when taking Flexeril. She will continue to apply heat to neck. Discussed gentle ROM stretches for neck.  Also discussed work restrictions. She has a toddler so will attempt not to lift him.    Primary complaint today is L sided neck pain. Will limit driving company vehicles. Recheck on Monday and anticipate RTW at that time.    Patient Instructions: Attention not to aggravate affected area, Daily home exercises/warm soaks   Restrictions: No lifting/pushing/pulling more than 10 lbs, No above the  shoulder/overhead work, No driving company vehicles  Follow up in about 5 days (around 3/10/2025).    I spent a total of 30 minutes on the day of the visit.This includes face to face time and non-face to face time preparing to see the patient (eg, review of tests), obtaining and/or reviewing separately obtained history, documenting clinical information in the electronic or other health record, independently interpreting results and communicating results to the patient/family/caregiver, or care coordinator.

## 2025-03-10 ENCOUNTER — OFFICE VISIT (OUTPATIENT)
Dept: URGENT CARE | Facility: CLINIC | Age: 30
End: 2025-03-10
Payer: OTHER MISCELLANEOUS

## 2025-03-10 VITALS
SYSTOLIC BLOOD PRESSURE: 113 MMHG | OXYGEN SATURATION: 98 % | HEART RATE: 84 BPM | WEIGHT: 293 LBS | HEIGHT: 63 IN | BODY MASS INDEX: 51.91 KG/M2 | RESPIRATION RATE: 17 BRPM | DIASTOLIC BLOOD PRESSURE: 71 MMHG

## 2025-03-10 DIAGNOSIS — Z02.6 ENCOUNTER RELATED TO WORKER'S COMPENSATION CLAIM: Primary | ICD-10-CM

## 2025-03-10 DIAGNOSIS — S16.1XXD STRAIN OF NECK MUSCLE, SUBSEQUENT ENCOUNTER: ICD-10-CM

## 2025-03-10 DIAGNOSIS — S00.93XD CONTUSION OF HEAD, UNSPECIFIED PART OF HEAD, SUBSEQUENT ENCOUNTER: ICD-10-CM

## 2025-03-10 PROCEDURE — 99213 OFFICE O/P EST LOW 20 MIN: CPT | Mod: S$GLB,,, | Performed by: NURSE PRACTITIONER

## 2025-03-10 NOTE — LETTER
[facility] - [place] [address]  Phone: [phone]  Fax: [phone]  [facility]: [id number]    Pt Name: Peyton Qiu  Injury Date: 03/01/2025    Employee ID: [id number] Date of Treatment: 03/10/2025   Company: [employer]      Appointment Time: 08:45 AM Arrived: 9:23 am   Provider: Karla Beyer NP Time Out: 9:45 AM     Office Treatment:   1. Encounter related to worker's compensation claim    2. Strain of neck muscle, subsequent encounter    3. Contusion of head, unspecified part of head, subsequent encounter          Patient Instructions: Attention not to aggravate affected area, Daily home exercises/warm soaks        Restrictions: Regular Duty, Discharged from Occupational Health     Return Appointment: PRN (as needed)      Follow up if symptoms worsen or fail to improve.      KW

## 2025-03-10 NOTE — PROGRESS NOTES
"Subjective:      Patient ID: Peyton Qiu is a 29 y.o. female.    Chief Complaint: Head Injury and Neck Pain    Patient's place of employment - [employer]  Patient's job title -   Date of injury -  03/01/2025  Body part injured - Head/Neck  Current work status per last visit - modified duty  Improved, same, or worse - improved  Pain Scale right now (1-10) -  0/10 mild headaches    KW    Patient returns today for follow-up from head and neck injury.  Says she is feeling much better this week.  Occasionally has very mild headaches for which she does not need to take any medication because "they are not that bad".  No longer having any neck pain. MWT    Neck Pain   Associated symptoms include headaches. Pertinent negatives include no numbness.       Neck: Negative for neck pain and neck stiffness.   Gastrointestinal:  Negative for abdominal pain, nausea and vomiting.   Musculoskeletal:  Negative for pain.   Skin:  Negative for laceration, erythema and bruising.   Neurological:  Positive for headaches. Negative for dizziness, light-headedness, passing out, coordination disturbances, loss of balance, disorientation, altered mental status, loss of consciousness, numbness and tingling.   Psychiatric/Behavioral:  Negative for altered mental status, disorientation and sleep disturbance.      Objective:     Physical Exam  Vitals and nursing note reviewed.   Constitutional:       General: She is not in acute distress.     Appearance: Normal appearance.   HENT:      Head: Normocephalic and atraumatic. No raccoon eyes, Bass's sign, abrasion or contusion.      Comments: No tenderness to palpation to top of head.  No swelling or ecchymosis noted.     Right Ear: Tympanic membrane, ear canal and external ear normal.      Left Ear: Tympanic membrane, ear canal and external ear normal.      Nose: Nose normal.   Eyes:      Extraocular Movements: Extraocular movements intact.      " Conjunctiva/sclera: Conjunctivae normal.      Pupils: Pupils are equal, round, and reactive to light.   Cardiovascular:      Pulses: Normal pulses.   Pulmonary:      Effort: Pulmonary effort is normal.   Musculoskeletal:         General: No swelling, tenderness or deformity.      Cervical back: Normal, normal range of motion and neck supple. No swelling or tenderness. No pain with movement. Normal range of motion.      Comments: Range of motion to neck is improved this week.  Full range of motion without pain.   Skin:     General: Skin is warm and dry.      Findings: No bruising or erythema.   Neurological:      General: No focal deficit present.      Mental Status: She is alert and oriented to person, place, and time. Mental status is at baseline.      Cranial Nerves: Cranial nerves 2-12 are intact. No cranial nerve deficit.      Motor: Motor function is intact. No weakness.      Coordination: Coordination is intact. Romberg sign negative. Coordination normal.      Gait: Gait normal.      Deep Tendon Reflexes: Reflexes normal.      Reflex Scores:       Tricep reflexes are 1+ on the right side and 1+ on the left side.       Bicep reflexes are 1+ on the right side and 1+ on the left side.  Psychiatric:         Mood and Affect: Mood normal.         Behavior: Behavior normal.         Thought Content: Thought content normal.         Judgment: Judgment normal.        Assessment:      1. Encounter related to worker's compensation claim    2. Strain of neck muscle, subsequent encounter    3. Contusion of head, unspecified part of head, subsequent encounter      Plan:   Peyton is feeling a lot better this week, no longer having any neck pain.  Reports very mild headaches which do not require any medication.  Denies any further nausea or vomiting, dizziness, lightheadedness or pain.  Neuro and musculoskeletal exam are both normal today.  She will return to regular work and will be discharged.       Patient Instructions:  Attention not to aggravate affected area, Daily home exercises/warm soaks   Restrictions: Regular Duty, Discharged from Occupational Health  Follow up if symptoms worsen or fail to improve.

## 2025-04-07 ENCOUNTER — HOSPITAL ENCOUNTER (EMERGENCY)
Facility: HOSPITAL | Age: 30
Discharge: HOME | End: 2025-04-07
Attending: EMERGENCY MEDICINE
Payer: COMMERCIAL

## 2025-04-07 VITALS
DIASTOLIC BLOOD PRESSURE: 72 MMHG | SYSTOLIC BLOOD PRESSURE: 160 MMHG | HEART RATE: 97 BPM | OXYGEN SATURATION: 100 % | HEIGHT: 63 IN | BODY MASS INDEX: 51.91 KG/M2 | WEIGHT: 293 LBS | RESPIRATION RATE: 16 BRPM | TEMPERATURE: 99 F

## 2025-04-07 DIAGNOSIS — G43.809 OTHER MIGRAINE WITHOUT STATUS MIGRAINOSUS, NOT INTRACTABLE: Primary | ICD-10-CM

## 2025-04-07 LAB
B-HCG UR QL: NEGATIVE
CTP QC/QA: YES

## 2025-04-07 PROCEDURE — 25000003 PHARM REV CODE 250: Performed by: EMERGENCY MEDICINE

## 2025-04-07 PROCEDURE — 99283 EMERGENCY DEPT VISIT LOW MDM: CPT

## 2025-04-07 PROCEDURE — 81025 URINE PREGNANCY TEST: CPT | Performed by: EMERGENCY MEDICINE

## 2025-04-07 RX ORDER — BUTALBITAL, ACETAMINOPHEN AND CAFFEINE 300; 40; 50 MG/1; MG/1; MG/1
1 CAPSULE ORAL EVERY 8 HOURS PRN
Qty: 15 CAPSULE | Refills: 0 | Status: SHIPPED | OUTPATIENT
Start: 2025-04-07 | End: 2025-05-07

## 2025-04-07 RX ORDER — BUTALBITAL, ACETAMINOPHEN AND CAFFEINE 50; 325; 40 MG/1; MG/1; MG/1
2 TABLET ORAL
Status: COMPLETED | OUTPATIENT
Start: 2025-04-07 | End: 2025-04-07

## 2025-04-07 RX ADMIN — BUTALBITAL, ACETAMINOPHEN, AND CAFFEINE 2 TABLET: 50; 325; 40 TABLET ORAL at 09:04

## 2025-04-07 NOTE — Clinical Note
"Peyton Benitez"Uyen was seen and treated in our emergency department on 4/7/2025.  She may return to work on 04/09/2025.  Patient cleared to work on 04/09/2025 which means she will be cleared to work her scheduled shift on 04/10/2025. Call if you have any further questions regarding this.     If you have any questions or concerns, please don't hesitate to call.      Gregory VELIZ    "

## 2025-04-08 NOTE — ED NOTES
Reviewed discharge information with patient. Aware to  paper prescription at preferred pharmacy; given work excuse per request. Independently ambulatory to lobby.

## 2025-04-08 NOTE — DISCHARGE INSTRUCTIONS
A referral to Neurology has been given to you. Call the number provided if you don't hear from a  within a week.     [facility] NEUROLOG[facility]  [address] [place] [id number] [phone]  Schedule an appointment as soon as possible for a visit in 1 week

## 2025-04-08 NOTE — ED PROVIDER NOTES
Emergency Department Encounter  Provider Note  Encounter Date: 4/7/2025    Patient Name: Peyton Qiu  MRN: [id number]    History of Present Illness   HPI  History of Present Illness:    Chief Complaint:   Chief Complaint   Patient presents with    Headache     Had a concussion last month, was playing with son Saturday night and was hit in the head by plastic toy. Ongoing HA since, tried tylenol and ibuprofen without relief. -N/V, +photophobia     29-year-old female with a history of concussion presenting with new headache after being hit in the head 2 nights ago.  Has been taking Tylenol and ibuprofen without improvement in symptoms.    The following PMH/PSH/SocHx/FamHx has been reviewed by myself:  Past Medical History:   Diagnosis Date    Class 3 severe obesity with body mass index (BMI) of 50.0 to 59.9 in adult 01/20/2022    Concussion 03/10/2025    Eczema     Insulin resistance 01/20/2024    PCOS (polycystic ovarian syndrome) 11/12/2020    Seasonal allergic rhinitis     Vertigo      Past Surgical History:   Procedure Laterality Date    removal of cyst in head  12/20/2023    WISDOM TOOTH EXTRACTION       Social History[1]  Family History   Problem Relation Name Age of Onset    Breast cancer Paternal Grandmother      Diabetes Maternal Grandmother      Heart disease Maternal Grandmother      Coronary artery disease Father      No Known Problems Mother      Colon cancer Neg Hx      Ovarian cancer Neg Hx       Allergies reviewed:  Review of patient's allergies indicates:   Allergen Reactions    Augmentin [amoxicillin-pot clavulanate] Rash    Betamethasone, augmented Diarrhea, Hives, Nausea And Vomiting and Rash     Medications reviewed:  Discharge Medication List as of 4/7/2025 10:20 PM        START taking these medications    Details   butalbital-acetaminophen-caff -40 mg Cap Take 1 capsule by mouth every 8 (eight) hours as needed (severe headache)., Starting Mon 4/7/2025, Until Wed 5/7/2025 at  2359, Print           CONTINUE these medications which have NOT CHANGED    Details   drospirenone-ethinyl estradioL (LUCIANO, 28,) 3-0.03 mg per tablet Take 1 tablet by mouth once daily., Starting Wed 2/19/2025, Until Thu 2/19/2026, Normal      famotidine (PEPCID) 40 MG tablet Take 1 tablet (40 mg total) by mouth daily as needed for Heartburn., Starting Mon 4/29/2024, Until Tue 4/29/2025 at 2359, Normal      ferrous sulfate 325 (65 FE) MG EC tablet Take 1 tablet (325 mg total) by mouth every other day., Starting Wed 5/25/2022, Normal      !! ibuprofen (ADVIL,MOTRIN) 600 MG tablet Take 1 tablet (600 mg total) by mouth every 6 (six) hours as needed (cramping)., Starting Fri 8/12/2022, Normal      !! ibuprofen (ADVIL,MOTRIN) 800 MG tablet Take 1 tablet (800 mg total) by mouth every 8 (eight) hours as needed for Pain., Starting Sun 3/2/2025, Normal      ketoconazole (NIZORAL) 2 % cream Apply topically 2 (two) times daily., Starting Tue 11/22/2022, Normal      meloxicam (MOBIC) 15 MG tablet Take 1 tablet (15 mg total) by mouth once daily., Starting Tue 12/6/2022, Normal      metFORMIN (GLUMETZA) 1000 MG (MOD) 24hr tablet Take 1 tablet (1,000 mg total) by mouth 2 (two) times daily with meals., Starting Sat 1/20/2024, Until Sun 1/19/2025, Normal      norethindrone (ORTHO MICRONOR) 0.35 mg tablet Take 1 tablet (0.35 mg total) by mouth once daily., Starting Thu 9/15/2022, Until Fri 9/15/2023, Normal      norethindrone-ethinyl estradiol (MICROGESTIN 1/20) 1-20 mg-mcg per tablet Take 1 tablet by mouth once daily., Starting Wed 3/20/2024, Normal      norgestimate-ethinyl estradioL (ORTHO-CYCLEN) 0.25-35 mg-mcg per tablet Take 1 tablet by mouth once daily., Starting Fri 10/13/2023, Until Sat 10/12/2024, Normal      OZEMPIC 0.25 mg or 0.5 mg (2 mg/3 mL) pen injector Inject into the skin., Starting Tue 5/2/2023, Historical Med      prenatal vit no.124/iron/folic (PRENATAL VITAMIN ORAL) Take by mouth., Historical Med       triamcinolone acetonide 0.1% (KENALOG) 0.1 % ointment Apply topically 2 (two) times daily., Starting Tue 11/22/2022, Normal       !! - Potential duplicate medications found. Please discuss with provider.          Physical Exam     Initial Vitals   BP Pulse Resp Temp SpO2   04/07/25 2120 04/07/25 2116 04/07/25 2116 04/07/25 2116 04/07/25 2116   (!) 160/91 104 14 99.2 °F (37.3 °C) 100 %      MAP       --                  Triage vital signs reviewed.    Constitutional: Well-nourished, well-developed. Not in acute distress.  Well-appearing  HENT: Normocephalic, atraumatic. Moist mucous membranes.  Eyes: No conjunctival injection.  EOMI  Resp: Normal respiratory effort, breathing unlabored.  Cardio: Regular rate and rhythm.  GI: Abdomen non-distended.   MSK: Extremities without any deformities noted. No lower extremity edema.  Skin: Warm and dry. No rashes or lesions noted.  Neuro: Awake and alert. Moves all extremities.  Cranial nerves 2-12 grossly intact    ED Course   Procedures    Medical Decision Making    History Acquisition     The history is provided by the patient.     Review of prior external/non ED notes:  History of concussion; history of leiomyoma follow up by OBGYN    Differential Diagnoses   Based on available information and initial assessment, the working Differential Diagnosis includes, but is not limited to:  Ischemic stroke, hemorrhagic stroke, subarachnoid hemorrhage/ruptured aneurysm, intracranial lesion/mass, meningitis/encephalitis, epidural hematoma, subdural hematoma, pseudotumor cerebri, venous sinus thrombosis, CO poisoning, hypertensive encephalopathy, MI/ACS, head trauma/contusion, concussion, sinus headache, dehydration, anxiety, medication non-compliance, primary headache (tension/cluster/migraine).    EKG   EKG ordered and independently reviewed by me:     Labs   Lab tests ordered and independently reviewed by me:    Labs Reviewed   POCT URINE PREGNANCY       Result Value    POC Preg  Test, Ur Negative       Acceptable Yes         Imaging   Imaging ordered and independently reviewed by me:   Imaging Results    None            Additional Consideration   Peyton Qiu  presents to the Emergency Department today with headache, possible migraine.  History of concussion and now with new head injury.  No red flags.  Will try Fioricet    Additional testing considered but not indicated during the course of this workup: further imaging and labwork, not indicated  Co-morbidities/chronic illness/exacerbation of chronic illness considered which impacted clinical decision making:  Concussion  Procedures done in the ED or plan for the OR: No  Social determinants of care considered during development of treatment plan include: Decreased medical literacy  Discussion of management or test interpretation with external provider: No  DNR discussion: No    The patient's list of active medications and allergies as documented per RN staff has been reviewed.  Medications given in the ED and/or prescribed:   Medications   butalbital-acetaminophen-caffeine -40 mg per tablet 2 tablet (2 tablets Oral Given 4/7/25 2129)             ED Course as of 04/07/25 2222 Mon Apr 07, 2025 2207 Patient feels much improved. Will kelly w neuro fu [CS]      ED Course User Index  [CS] Zenaida Calvin MD       Explanation of disposition: Discharge    Clinical Impression:     1. Other migraine without status migrainosus, not intractable         All results from the workup were reviewed with the patient/family in detail. I discussed with the patient and/or the family/caregiver that today's evaluation in the ED does not suggest any emergent or life-threatening medical conditions that would require hospitalization or immediate intervention beyond what was provided in the ED. I believe the patient is safe for discharge.  However, a reassuring evaluation in the ED does not preclude the presence or development  of a serious or life-threatening condition. As such, strict return precautions were discussed with the patient expressing understanding of instructions, and all questions answered. The patient/family communicates understanding of all this information and all remaining questions and concerns were addressed at this time.    The patient/family has been provided with verbal and printed direction regarding our final diagnosis(es) as well as instructions regarding use of OTC and/or Rx medications intended to manage the patient's aforementioned conditions including:  ED Prescriptions       Medication Sig Dispense Start Date End Date Auth. Provider    butalbital-acetaminophen-caff -40 mg Cap Take 1 capsule by mouth every 8 (eight) hours as needed (severe headache). 15 capsule 4/7/2025 5/7/2025 Zenaida Calvin MD          The patient's condition does not warrant review of the  and prescription of controlled substances.      ED Disposition Condition    Discharge Stable                 [1]   Social History  Tobacco Use    Smoking status: Never    Smokeless tobacco: Never   Substance Use Topics    Alcohol use: No    Drug use: Never        Zenaida Calvin MD  04/07/25 9960

## 2025-04-24 ENCOUNTER — TELEPHONE (OUTPATIENT)
Facility: CLINIC | Age: 30
End: 2025-04-24
Payer: COMMERCIAL

## 2025-04-24 NOTE — TELEPHONE ENCOUNTER
Spoke to Pt about scheduling her appt for her concussion. She has to wait til her work schedule comes out tonight. I wont be in clinic tomorrow or Monday. I told her I could call her on Tuesday.

## 2025-06-16 NOTE — TELEPHONE ENCOUNTER
----- Message from Salima Medeiros sent at 4/2/2024 10:09 AM CDT -----  Type:  Reschedule Appointment Request    Caller is requesting a sooner appointment.  Caller declined first available appointment listed below.  Caller will not accept being placed on the waitlist and is requesting a message be sent to doctor.  Name of Caller:pt  When is the first available appointment?  Symptoms:heavy bleeding   Would the patient rather a call back or a response via [facility]? call  Best Call Back Number:[phone]   Additional Information:         
06-16    143  |  106  |  30[H]  ----------------------------<  106[H]  4.0   |  30  |  0.99    Ca    9.0      16 Jun 2025 06:10  Phos  2.5     06-16  Mg     1.5     06-16    TPro  5.5[L]  /  Alb  2.8[L]  /  TBili  0.9  /  DBili  x   /  AST  6[L]  /  ALT  27  /  AlkPhos  40  06-15  POCT Blood Glucose.: 123 mg/dL (06-16-25 @ 07:58)  A1C with Estimated Average Glucose Result: 5.6 % (06-10-25 @ 06:40)  A1C with Estimated Average Glucose Result: 5.1 % (07-24-24 @ 10:50)

## 2025-07-09 ENCOUNTER — PATIENT MESSAGE (OUTPATIENT)
Dept: OBSTETRICS AND GYNECOLOGY | Facility: CLINIC | Age: 30
End: 2025-07-09
Payer: COMMERCIAL

## 2025-07-10 ENCOUNTER — TELEPHONE (OUTPATIENT)
Dept: OBSTETRICS AND GYNECOLOGY | Facility: CLINIC | Age: 30
End: 2025-07-10
Payer: COMMERCIAL

## 2025-07-10 RX ORDER — NORETHINDRONE ACETATE AND ETHINYL ESTRADIOL .02; 1 MG/1; MG/1
1 TABLET ORAL DAILY
Qty: 84 TABLET | Refills: 0 | Status: SHIPPED | OUTPATIENT
Start: 2025-07-10 | End: 2025-07-10 | Stop reason: ALTCHOICE

## 2025-07-10 RX ORDER — NORETHINDRONE 0.35 MG/1
1 TABLET ORAL DAILY
Qty: 84 TABLET | Refills: 0 | Status: SHIPPED | OUTPATIENT
Start: 2025-07-10 | End: 2025-07-10 | Stop reason: ALTCHOICE

## 2025-07-10 RX ORDER — DROSPIRENONE AND ETHINYL ESTRADIOL 0.03MG-3MG
1 KIT ORAL DAILY
Qty: 84 TABLET | Refills: 0 | Status: SHIPPED | OUTPATIENT
Start: 2025-07-10

## 2025-07-10 RX ORDER — DROSPIRENONE AND ETHINYL ESTRADIOL 0.03MG-3MG
1 KIT ORAL DAILY
Qty: 84 TABLET | Refills: 0 | Status: SHIPPED | OUTPATIENT
Start: 2025-07-10 | End: 2025-07-10 | Stop reason: SDUPTHER

## 2025-07-10 NOTE — TELEPHONE ENCOUNTER
Refill Routing Note   Medication(s) are not appropriate for processing by [facility] for the following reason(s):        No active prescription written by provider  Due for refill >6 months ago  Drug-drug interaction: Ortho Micronor vs. Microgestin    [facility] action(s):  Defer        Medication Therapy Plan: Patient is requesting Ortho Micronor to be sent to new pharmacy. Microgestin was most recently prescribed for patient.      Appointments  past 12m or future 3m with PCP    Date Provider   Last Visit   1/17/2024 Maki Gomes MD   Next Visit   Visit date not found Maki Gomes MD   ED visits in past 90 days: 0        Note composed:8:15 PM 07/09/2025

## 2025-07-10 NOTE — TELEPHONE ENCOUNTER
Refill Decision Note   Petyonkatina Myersrero  is requesting a refill authorization.    Brief Assessment and Rationale for Refill:  Approve       Medication Therapy Plan:  Patient sent picture in portal message confirming most current birth control she's taking      Comments:     Note composed:9:52 AM 07/10/2025